# Patient Record
Sex: MALE | Race: WHITE | NOT HISPANIC OR LATINO | Employment: UNEMPLOYED | URBAN - NONMETROPOLITAN AREA
[De-identification: names, ages, dates, MRNs, and addresses within clinical notes are randomized per-mention and may not be internally consistent; named-entity substitution may affect disease eponyms.]

---

## 2022-01-21 ENCOUNTER — HOSPITAL ENCOUNTER (EMERGENCY)
Facility: HOSPITAL | Age: 52
Discharge: HOME | End: 2022-01-21
Attending: EMERGENCY MEDICINE | Admitting: EMERGENCY MEDICINE

## 2022-01-21 ENCOUNTER — APPOINTMENT (EMERGENCY)
Dept: RADIOLOGY | Facility: HOSPITAL | Age: 52
End: 2022-01-21

## 2022-01-21 VITALS
SYSTOLIC BLOOD PRESSURE: 139 MMHG | DIASTOLIC BLOOD PRESSURE: 93 MMHG | OXYGEN SATURATION: 98 % | HEART RATE: 68 BPM | RESPIRATION RATE: 17 BRPM | WEIGHT: 195 LBS | TEMPERATURE: 98.1 F

## 2022-01-21 DIAGNOSIS — U07.1 COVID-19: ICD-10-CM

## 2022-01-21 DIAGNOSIS — K74.60 CIRRHOSIS (HCC): ICD-10-CM

## 2022-01-21 DIAGNOSIS — R17 JAUNDICE: ICD-10-CM

## 2022-01-21 DIAGNOSIS — R07.89 ATYPICAL CHEST PAIN: Primary | ICD-10-CM

## 2022-01-21 LAB
ALBUMIN SERPL BCP-MCNC: 2.7 G/DL (ref 3.5–5)
ALP SERPL-CCNC: 112 U/L (ref 46–116)
ALT SERPL W P-5'-P-CCNC: 45 U/L (ref 12–78)
ANION GAP SERPL CALCULATED.3IONS-SCNC: 9 MMOL/L (ref 4–13)
APTT PPP: 39 SECONDS (ref 23–37)
AST SERPL W P-5'-P-CCNC: 118 U/L (ref 5–45)
BASOPHILS # BLD AUTO: 0.04 THOUSANDS/ΜL (ref 0–0.1)
BASOPHILS NFR BLD AUTO: 1 % (ref 0–1)
BILIRUB SERPL-MCNC: 7.98 MG/DL (ref 0.2–1)
BUN SERPL-MCNC: 8 MG/DL (ref 5–25)
CALCIUM ALBUM COR SERPL-MCNC: 9.6 MG/DL (ref 8.3–10.1)
CALCIUM SERPL-MCNC: 8.6 MG/DL (ref 8.3–10.1)
CARDIAC TROPONIN I PNL SERPL HS: 12 NG/L
CHLORIDE SERPL-SCNC: 100 MMOL/L (ref 100–108)
CK MB SERPL-MCNC: 1.7 NG/ML (ref 0–5)
CK MB SERPL-MCNC: <1 % (ref 0–2.5)
CK SERPL-CCNC: 525 U/L (ref 39–308)
CO2 SERPL-SCNC: 25 MMOL/L (ref 21–32)
CREAT SERPL-MCNC: 0.82 MG/DL (ref 0.6–1.3)
EOSINOPHIL # BLD AUTO: 0.2 THOUSAND/ΜL (ref 0–0.61)
EOSINOPHIL NFR BLD AUTO: 4 % (ref 0–6)
ERYTHROCYTE [DISTWIDTH] IN BLOOD BY AUTOMATED COUNT: 13.9 % (ref 11.6–15.1)
FLUAV RNA RESP QL NAA+PROBE: NEGATIVE
FLUBV RNA RESP QL NAA+PROBE: NEGATIVE
GFR SERPL CREATININE-BSD FRML MDRD: 103 ML/MIN/1.73SQ M
GLUCOSE SERPL-MCNC: 105 MG/DL (ref 65–140)
HCT VFR BLD AUTO: 42.3 % (ref 36.5–49.3)
HGB BLD-MCNC: 14.6 G/DL (ref 12–17)
IMM GRANULOCYTES # BLD AUTO: 0.01 THOUSAND/UL (ref 0–0.2)
IMM GRANULOCYTES NFR BLD AUTO: 0 % (ref 0–2)
INR PPP: 1.72 (ref 0.84–1.19)
LYMPHOCYTES # BLD AUTO: 1.03 THOUSANDS/ΜL (ref 0.6–4.47)
LYMPHOCYTES NFR BLD AUTO: 21 % (ref 14–44)
MAGNESIUM SERPL-MCNC: 1.9 MG/DL (ref 1.6–2.6)
MCH RBC QN AUTO: 33.7 PG (ref 26.8–34.3)
MCHC RBC AUTO-ENTMCNC: 34.5 G/DL (ref 31.4–37.4)
MCV RBC AUTO: 98 FL (ref 82–98)
MONOCYTES # BLD AUTO: 1.05 THOUSAND/ΜL (ref 0.17–1.22)
MONOCYTES NFR BLD AUTO: 21 % (ref 4–12)
NEUTROPHILS # BLD AUTO: 2.57 THOUSANDS/ΜL (ref 1.85–7.62)
NEUTS SEG NFR BLD AUTO: 53 % (ref 43–75)
NRBC BLD AUTO-RTO: 0 /100 WBCS
NT-PROBNP SERPL-MCNC: 76 PG/ML
PLATELET # BLD AUTO: 68 THOUSANDS/UL (ref 149–390)
PMV BLD AUTO: 10.7 FL (ref 8.9–12.7)
POTASSIUM SERPL-SCNC: 3.9 MMOL/L (ref 3.5–5.3)
PROT SERPL-MCNC: 7.7 G/DL (ref 6.4–8.2)
PROTHROMBIN TIME: 19.8 SECONDS (ref 11.6–14.5)
RBC # BLD AUTO: 4.33 MILLION/UL (ref 3.88–5.62)
RSV RNA RESP QL NAA+PROBE: NEGATIVE
SARS-COV-2 RNA RESP QL NAA+PROBE: POSITIVE
SODIUM SERPL-SCNC: 134 MMOL/L (ref 136–145)
WBC # BLD AUTO: 4.9 THOUSAND/UL (ref 4.31–10.16)

## 2022-01-21 PROCEDURE — 93005 ELECTROCARDIOGRAM TRACING: CPT

## 2022-01-21 PROCEDURE — 85610 PROTHROMBIN TIME: CPT | Performed by: EMERGENCY MEDICINE

## 2022-01-21 PROCEDURE — 83880 ASSAY OF NATRIURETIC PEPTIDE: CPT | Performed by: EMERGENCY MEDICINE

## 2022-01-21 PROCEDURE — 0241U HB NFCT DS VIR RESP RNA 4 TRGT: CPT | Performed by: EMERGENCY MEDICINE

## 2022-01-21 PROCEDURE — 80053 COMPREHEN METABOLIC PANEL: CPT | Performed by: EMERGENCY MEDICINE

## 2022-01-21 PROCEDURE — 82550 ASSAY OF CK (CPK): CPT | Performed by: EMERGENCY MEDICINE

## 2022-01-21 PROCEDURE — 99285 EMERGENCY DEPT VISIT HI MDM: CPT

## 2022-01-21 PROCEDURE — 36415 COLL VENOUS BLD VENIPUNCTURE: CPT | Performed by: EMERGENCY MEDICINE

## 2022-01-21 PROCEDURE — 85025 COMPLETE CBC W/AUTO DIFF WBC: CPT | Performed by: EMERGENCY MEDICINE

## 2022-01-21 PROCEDURE — 83735 ASSAY OF MAGNESIUM: CPT | Performed by: EMERGENCY MEDICINE

## 2022-01-21 PROCEDURE — 71045 X-RAY EXAM CHEST 1 VIEW: CPT

## 2022-01-21 PROCEDURE — 84484 ASSAY OF TROPONIN QUANT: CPT | Performed by: EMERGENCY MEDICINE

## 2022-01-21 PROCEDURE — 82553 CREATINE MB FRACTION: CPT | Performed by: EMERGENCY MEDICINE

## 2022-01-21 PROCEDURE — 99285 EMERGENCY DEPT VISIT HI MDM: CPT | Performed by: EMERGENCY MEDICINE

## 2022-01-21 PROCEDURE — 85730 THROMBOPLASTIN TIME PARTIAL: CPT | Performed by: EMERGENCY MEDICINE

## 2022-01-21 RX ORDER — LACTULOSE 20 G/30ML
10 SOLUTION ORAL 2 TIMES DAILY
Qty: 900 ML | Refills: 0 | Status: SHIPPED | OUTPATIENT
Start: 2022-01-21 | End: 2022-02-20

## 2022-01-21 RX ORDER — SPIRONOLACTONE 50 MG/1
50 TABLET, FILM COATED ORAL 2 TIMES DAILY
Qty: 60 TABLET | Refills: 0 | Status: SHIPPED | OUTPATIENT
Start: 2022-01-21

## 2022-01-21 RX ORDER — NADOLOL 20 MG/1
20 TABLET ORAL DAILY
Qty: 30 TABLET | Refills: 0 | Status: SHIPPED | OUTPATIENT
Start: 2022-01-21

## 2022-01-21 RX ORDER — OMEPRAZOLE 20 MG/1
40 CAPSULE, DELAYED RELEASE ORAL DAILY
Qty: 60 CAPSULE | Refills: 0 | Status: SHIPPED | OUTPATIENT
Start: 2022-01-21 | End: 2022-02-20

## 2022-01-21 RX ORDER — FLUOXETINE 20 MG/1
20 TABLET, FILM COATED ORAL DAILY
Qty: 30 TABLET | Refills: 2 | Status: SHIPPED | OUTPATIENT
Start: 2022-01-21

## 2022-01-21 RX ORDER — FOLIC ACID 1 MG/1
1 TABLET ORAL DAILY
Qty: 30 TABLET | Refills: 0 | Status: SHIPPED | OUTPATIENT
Start: 2022-01-21

## 2022-01-21 RX ORDER — FOLIC ACID/VIT BCOMP,C/CU/ZINC 5-1.5-25MG
5 TABLET ORAL DAILY
Qty: 30 TABLET | Refills: 0 | Status: SHIPPED | OUTPATIENT
Start: 2022-01-21 | End: 2022-02-20

## 2022-01-21 NOTE — ED PROVIDER NOTES
History  Chief Complaint   Patient presents with    Chest Pain     presents due to CP x5 days L anterior 7/1, denies SOB, has a cough, COVID unvaccinated, pt also is jaundiced and states has hx of liver failure but has not been on medications for some time due to loosing insurance  denies N  V D    Jaundice     Patient is a 53-year-old male presenting to the emergency department complaining of dull achy left-sided chest pain that is been present x5 days, states this started while he was walking his dog and has persisted since, no waxing/waning, no aggravating or alleviating symptoms, he reports slight nasal congestion and runny nose, denies any cough, cold, no fever or chills, no nausea vomiting diarrhea, he does have a history of cirrhosis with jaundice secondary to previous alcohol use, states his last drink was approximately 2 days ago, he has not been seen by his gastroenterologist or hepatologist secondary to losing insurance recently, ran out of his medications approximately 1 month ago, patient has not been vaccinated for COVID-19 but denies any in her known history of COVID-19 or any recent COVID positive contacts, patient states he called his PCP this morning in an effort to get an appointment but none were available prompting them to send him to the emergency department for evaluation          None       Past Medical History:   Diagnosis Date    Hypertension     Liver failure ([facility] [address] )        Past Surgical History:   Procedure Laterality Date    BRAIN SURGERY         History reviewed  No pertinent family history  I have reviewed and agree with the history as documented  E-Cigarette/Vaping     E-Cigarette/Vaping Substances     Social History     Tobacco Use    Smoking status: Never Smoker    Smokeless tobacco: Never Used   Substance Use Topics    Alcohol use: Never     Comment: former    Drug use: Not on file       Review of Systems   Constitutional: Negative      HENT: Positive for congestion and rhinorrhea  Eyes: Negative  Respiratory: Positive for shortness of breath  Cardiovascular: Positive for chest pain  Gastrointestinal: Negative  Endocrine: Negative  Genitourinary: Negative  Musculoskeletal: Negative  Skin: Negative          jaundice   Allergic/Immunologic: Negative  Neurological: Negative  Hematological: Negative  Psychiatric/Behavioral: Negative  Physical Exam  Physical Exam  Constitutional:       Appearance: He is well-developed  HENT:      Head: Normocephalic and atraumatic  Eyes:      General: Scleral icterus present  Conjunctiva/sclera:      Right eye: Right conjunctiva is injected  Left eye: Left conjunctiva is injected  Pupils: Pupils are equal, round, and reactive to light  Cardiovascular:      Rate and Rhythm: Normal rate and regular rhythm  Heart sounds: Normal heart sounds  Pulmonary:      Effort: Pulmonary effort is normal       Breath sounds: Normal breath sounds  Abdominal:      Palpations: Abdomen is soft  Musculoskeletal:         General: Normal range of motion  Cervical back: Normal range of motion and neck supple  Skin:     General: Skin is warm and dry  Coloration: Skin is jaundiced  Neurological:      Mental Status: He is alert and oriented to person, place, and time           Vital Signs  ED Triage Vitals [01/21/22 1308]   Temperature Pulse Respirations Blood Pressure SpO2   98 1 °F (36 7 °C) 67 20 139/93 98 %      Temp Source Heart Rate Source Patient Position - Orthostatic VS BP Location FiO2 (%)   Oral Monitor Sitting Right arm --      Pain Score       7           Vitals:    01/21/22 1430 01/21/22 1445 01/21/22 1500 01/21/22 1515   BP:       Pulse: 65 65 66 68   Patient Position - Orthostatic VS:                 ED Medications  Medications - No data to display    Diagnostic Studies  Results Reviewed     Procedure Component Value Units Date/Time    CKMB [[id number]]  (Normal) Collected: 01/21/22 1403    Lab Status: Final result Specimen: Blood from Arm, Left Updated: 01/21/22 1543     CK-MB Index <1 0 %      CK-MB 1 7 ng/mL     Magnesium [[id number]]  (Normal) Collected: 01/21/22 1403    Lab Status: Final result Specimen: Blood from Arm, Left Updated: 01/21/22 1512     Magnesium 1 9 mg/dL     NT-BNP PRO [[id number]]  (Normal) Collected: 01/21/22 1403    Lab Status: Final result Specimen: Blood from Arm, Left Updated: 01/21/22 1512     NT-proBNP 76 pg/mL     COVID/FLU/RSV - 2 hour TAT [[id number]]  (Abnormal) Collected: 01/21/22 1403    Lab Status: Final result Specimen: Nares from Nose Updated: 01/21/22 1447     SARS-CoV-2 Positive     INFLUENZA A PCR Negative     INFLUENZA B PCR Negative     RSV PCR Negative    Narrative:      FOR PEDIATRIC PATIENTS - copy/paste COVID Guidelines URL to browser: [email or web address]  [email or web address]    SARS-CoV-2 assay is a Nucleic Acid Amplification assay intended for the  qualitative detection of nucleic acid from SARS-CoV-2 in nasopharyngeal  swabs  Results are for the presumptive identification of SARS-CoV-2 RNA  Positive results are indicative of infection with SARS-CoV-2, the virus  causing COVID-19, but do not rule out bacterial infection or co-infection  with other viruses  Laboratories within the [place] and its  territories are required to report all positive results to the appropriate  public health authorities  Negative results do not preclude SARS-CoV-2  infection and should not be used as the sole basis for treatment or other  patient management decisions  Negative results must be combined with  clinical observations, patient history, and epidemiological information  This test has not been FDA cleared or approved  This test has been authorized by FDA under an Emergency Use Authorization  (EUA)   This test is only authorized for the duration of time the  declaration that circumstances exist justifying the authorization of the  emergency use of an in vitro diagnostic tests for detection of SARS-CoV-2  virus and/or diagnosis of COVID-19 infection under section 564(b)(1) of  the Act, 21 U  S C  881BTS-9(F)(2), unless the authorization is terminated  or revoked sooner  The test has been validated but independent review by FDA  and CLIA is pending  Test performed using [email or web address] GeneXpert: This RT-PCR assay targets N2,  a region unique to SARS-CoV-2  A conserved region in the E-gene was chosen  for pan-Sarbecovirus detection which includes SARS-CoV-2      HS Troponin 0hr (reflex protocol) [[id number]] Collected: 01/21/22 1403    Lab Status: Final result Specimen: Blood from Arm, Left Updated: 01/21/22 1435     hs TnI 0hr 12 ng/L     CBC and differential [[id number]]  (Abnormal) Collected: 01/21/22 1403    Lab Status: Final result Specimen: Blood from Arm, Left Updated: 01/21/22 1435     WBC 4 90 Thousand/uL      RBC 4 33 Million/uL      Hemoglobin 14 6 g/dL      Hematocrit 42 3 %      MCV 98 fL      MCH 33 7 pg      MCHC 34 5 g/dL      RDW 13 9 %      MPV 10 7 fL      Platelets 68 Thousands/uL      nRBC 0 /100 WBCs      Neutrophils Relative 53 %      Immat GRANS % 0 %      Lymphocytes Relative 21 %      Monocytes Relative 21 %      Eosinophils Relative 4 %      Basophils Relative 1 %      Neutrophils Absolute 2 57 Thousands/µL      Immature Grans Absolute 0 01 Thousand/uL      Lymphocytes Absolute 1 03 Thousands/µL      Monocytes Absolute 1 05 Thousand/µL      Eosinophils Absolute 0 20 Thousand/µL      Basophils Absolute 0 04 Thousands/µL     CK Total with Reflex CKMB [[id number]]  (Abnormal) Collected: 01/21/22 1403    Lab Status: Final result Specimen: Blood from Arm, Left Updated: 01/21/22 1433     Total  U/L     Comprehensive metabolic panel [[id number]]  (Abnormal) Collected: 01/21/22 1403    Lab Status: Final result Specimen: Blood from Arm, Left Updated: 01/21/22 1427     Sodium 134 mmol/L      Potassium 3 9 mmol/L      Chloride 100 mmol/L      CO2 25 mmol/L      ANION GAP 9 mmol/L      BUN 8 mg/dL      Creatinine 0 82 mg/dL      Glucose 105 mg/dL      Calcium 8 6 mg/dL      Corrected Calcium 9 6 mg/dL       U/L      ALT 45 U/L      Alkaline Phosphatase 112 U/L      Total Protein 7 7 g/dL      Albumin 2 7 g/dL      Total Bilirubin 7 98 mg/dL      eGFR 103 ml/min/1 73sq m     Narrative:      [place] guidelines for Chronic Kidney Disease (CKD):     Stage 1 with normal or high GFR (GFR > 90 mL/min/1 73 square meters)    Stage 2 Mild CKD (GFR = 60-89 mL/min/1 73 square meters)    Stage 3A Moderate CKD (GFR = 45-59 mL/min/1 73 square meters)    Stage 3B Moderate CKD (GFR = 30-44 mL/min/1 73 square meters)    Stage 4 Severe CKD (GFR = 15-29 mL/min/1 73 square meters)    Stage 5 End Stage CKD (GFR <15 mL/min/1 73 square meters)  Note: GFR calculation is accurate only with a steady state creatinine    Protime-INR [[id number]]  (Abnormal) Collected: 01/21/22 1403    Lab Status: Final result Specimen: Blood from Arm, Left Updated: 01/21/22 1427     Protime 19 8 seconds      INR 1 72    APTT [[id number]]  (Abnormal) Collected: 01/21/22 1403    Lab Status: Final result Specimen: Blood from Arm, Left Updated: 01/21/22 1427     PTT 39 seconds                  XR chest 1 view portable   Final Result by Kyle Bauer MD (01/21 1438)      No acute cardiopulmonary disease                    Workstation performed: [id number]                    Procedures  ECG 12 Lead Documentation Only    Date/Time: 1/21/2022 1:55 PM  Performed by: Franklin Loaiza DO  Authorized by: Franklin Loaiza DO     Indications / Diagnosis:  Chest pain  ECG reviewed by me, the ED Provider: yes    Patient location:  ED  Previous ECG:     Previous ECG:  Unavailable    Comparison to cardiac monitor: Yes    Interpretation:     Interpretation: normal    Rate:     ECG rate:  67    ECG rate assessment: normal    Rhythm: Rhythm: sinus rhythm    Ectopy:     Ectopy: none    QRS:     QRS axis:  Normal    QRS intervals:  Normal  Conduction:     Conduction: normal    ST segments:     ST segments:  Normal  T waves:     T waves: normal               ED Course  ED Course as of 01/21/22 1609   Fri Jan 21, 2022   1451 HS Troponin 0hr (reflex protocol)   1520 Lab and imaging findings discussed with patient at bedside, COVID-19 testing positive, likely the cause of patient's current symptoms, he has been having some chest pain for 5 days and troponin is only 12, EKG unremarkable, chest x-ray unremarkable, symptoms not consistent with ACS, he is noted to have thrombocytopenia with elevated total bilirubin, relatively similar to previous labs and consistent with patient's known history of liver disease                                             MDM    Disposition  Final diagnoses:   Atypical chest pain   COVID-19   Jaundice   Cirrhosis ([facility] [address] )     Time reflects when diagnosis was documented in both MDM as applicable and the Disposition within this note     Time User Action Codes Description Comment    1/21/2022  3:19 PM [place] Favia Add [R07 89] Atypical chest pain     1/21/2022  3:19 PM [place] Favia Add [U07 1] COVID-19     1/21/2022  3:19 PM Polanczyk, Rowena Favre Add [R17] Jaundice     1/21/2022  3:19 PM Polanczyk, Rowena Favre Add [K74 60] Cirrhosis [facility])       ED Disposition     ED Disposition Condition Date/Time Comment    Discharge Stable Fri Jan 21, 2022  3:19 PM Gurpreet Fraser discharge to home/self care  Follow-up Information     Follow up With Specialties Details Why Contact Info    Terri James MD Internal Medicine  As needed [address] [place] [address]            There are no discharge medications for this patient  No discharge procedures on file      PDMP Review     None          ED Provider  Electronically Signed by           Philipp Bullock DO  01/21/22 0798

## 2022-01-21 NOTE — Clinical Note
Fred Schilder was seen and treated in our emergency department on 1/21/2022  Diagnosis:     Kenya Disla  may return to work on return date  He may return on this date: 01/27/2022    If asymptomatic     If you have any questions or concerns, please don't hesitate to call        Jillian Ryan DO    ______________________________           _______________          _______________  Hospital Representative                              Date                                Time

## 2022-01-24 LAB
ATRIAL RATE: 67 BPM
P AXIS: 85 DEGREES
PR INTERVAL: 112 MS
QRS AXIS: 54 DEGREES
QRSD INTERVAL: 86 MS
QT INTERVAL: 442 MS
QTC INTERVAL: 467 MS
T WAVE AXIS: 52 DEGREES
VENTRICULAR RATE: 67 BPM

## 2022-04-06 ENCOUNTER — HOSPITAL ENCOUNTER (EMERGENCY)
Facility: HOSPITAL | Age: 52
Discharge: HOME | End: 2022-04-06
Attending: EMERGENCY MEDICINE | Admitting: EMERGENCY MEDICINE

## 2022-04-06 VITALS
TEMPERATURE: 98.8 F | RESPIRATION RATE: 18 BRPM | DIASTOLIC BLOOD PRESSURE: 78 MMHG | WEIGHT: 194 LBS | OXYGEN SATURATION: 95 % | HEART RATE: 83 BPM | SYSTOLIC BLOOD PRESSURE: 126 MMHG

## 2022-04-06 DIAGNOSIS — Z76.89 RETURN TO WORK EVALUATION: Primary | ICD-10-CM

## 2022-04-06 PROCEDURE — 99281 EMR DPT VST MAYX REQ PHY/QHP: CPT

## 2022-04-06 PROCEDURE — 99282 EMERGENCY DEPT VISIT SF MDM: CPT | Performed by: EMERGENCY MEDICINE

## 2022-04-06 NOTE — Clinical Note
Atif Hayes was seen and treated in our emergency department on 4/6/2022  Diagnosis:     Brandon Lynne  may return to work on return date  He may return on this date: 04/06/2022         If you have any questions or concerns, please don't hesitate to call        Prosper Partida, DO    ______________________________           _______________          _______________  Hospital Representative                              Date                                Time

## 2022-04-06 NOTE — Clinical Note
Alisha Pedraza was seen and treated in our emergency department on 4/6/2022  Diagnosis:     Debbie Marinelli  may return to work on return date  He may return on this date: If you have any questions or concerns, please don't hesitate to call        Korina Myers, DO    ______________________________           _______________          _______________  Hospital Representative                              Date                                Time

## 2022-04-06 NOTE — Clinical Note
Álvaro Varela was seen and treated in our emergency department on 4/6/2022  Diagnosis:     Luanne Harmon  may return to work on return date  He may return on this date: If you have any questions or concerns, please don't hesitate to call        Melissa Ramirez DO    ______________________________           _______________          _______________  Hospital Representative                              Date                                Time

## 2022-04-06 NOTE — ED PROVIDER NOTES
History  Chief Complaint   Patient presents with    Labs Only     pt tested covid positive 1 month ago  pt states he needs to be retested to go back to work  Patient is a 44-year-old male presents emergency department asymptomatic currently requesting repeat testing for COVID due to a positive test 1 month ago he was advised by his employer that he needed to be retested to return to work  History provided by:  Patient      Prior to Admission Medications   Prescriptions Last Dose Informant Patient Reported? Taking? B-Complex-C-Biotin-Minerals-FA (Folbee Plus CZ) 5 MG TABS   No No   Sig: Take 1 tablet (5 mg total) by mouth in the morning   FLUoxetine (PROzac) 20 MG tablet   No No   Sig: Take 1 tablet (20 mg total) by mouth daily   Multiple Vitamins-Minerals (Centrum Men) TABS   No No   Sig: Take 1 tablet by mouth in the morning   folic acid (KP Folic Acid) 1 mg tablet   No No   Sig: Take 1 tablet (1 mg total) by mouth daily   lactulose 20 g/30 mL   No No   Sig: Take 15 mL (10 g total) by mouth 2 (two) times a day   nadolol (CORGARD) 20 mg tablet   No No   Sig: Take 1 tablet (20 mg total) by mouth daily   omeprazole (PriLOSEC) 20 mg delayed release capsule   No No   Sig: Take 2 capsules (40 mg total) by mouth daily   spironolactone (ALDACTONE) 50 mg tablet   No No   Sig: Take 1 tablet (50 mg total) by mouth 2 (two) times a day      Facility-Administered Medications: None       Past Medical History:   Diagnosis Date    Hypertension     Liver failure ([facility] [address] )        Past Surgical History:   Procedure Laterality Date    BRAIN SURGERY         History reviewed  No pertinent family history  I have reviewed and agree with the history as documented      E-Cigarette/Vaping     E-Cigarette/Vaping Substances     Social History     Tobacco Use    Smoking status: Never Smoker    Smokeless tobacco: Never Used   Substance Use Topics    Alcohol use: Never     Comment: former    Drug use: Not on file       Review of Systems   Constitutional: Negative for activity change, appetite change, chills, fatigue and fever  HENT: Negative for congestion, ear pain, rhinorrhea and sore throat  Eyes: Negative for discharge, redness and visual disturbance  Respiratory: Negative for cough, chest tightness, shortness of breath and wheezing  Cardiovascular: Negative for chest pain and palpitations  Gastrointestinal: Negative for abdominal pain, constipation, diarrhea, nausea and vomiting  Endocrine: Negative for polydipsia and polyuria  Genitourinary: Negative for difficulty urinating, dysuria, frequency, hematuria and urgency  Musculoskeletal: Negative for arthralgias and myalgias  Skin: Negative for color change, pallor and rash  Neurological: Negative for dizziness, weakness, light-headedness, numbness and headaches  Hematological: Negative for adenopathy  Does not bruise/bleed easily  All other systems reviewed and are negative  Physical Exam  Physical Exam  Vitals and nursing note reviewed  Constitutional:       Appearance: He is well-developed  HENT:      Head: Normocephalic and atraumatic  Right Ear: External ear normal       Left Ear: External ear normal       Nose: Nose normal    Eyes:      Conjunctiva/sclera: Conjunctivae normal       Pupils: Pupils are equal, round, and reactive to light  Cardiovascular:      Rate and Rhythm: Normal rate and regular rhythm  Heart sounds: Normal heart sounds  Pulmonary:      Effort: Pulmonary effort is normal  No respiratory distress  Breath sounds: Normal breath sounds  No wheezing or rales  Chest:      Chest wall: No tenderness  Abdominal:      General: Bowel sounds are normal  There is no distension  Palpations: Abdomen is soft  Tenderness: There is no abdominal tenderness  There is no guarding  Musculoskeletal:         General: Normal range of motion  Cervical back: Normal range of motion and neck supple     Skin: General: Skin is warm and dry  Neurological:      Mental Status: He is alert and oriented to person, place, and time  Cranial Nerves: No cranial nerve deficit  Sensory: No sensory deficit  Vital Signs  ED Triage Vitals [04/06/22 1115]   Temperature Pulse Respirations Blood Pressure SpO2   98 8 °F (37 1 °C) 83 18 126/78 95 %      Temp Source Heart Rate Source Patient Position - Orthostatic VS BP Location FiO2 (%)   Temporal Monitor Sitting Left arm --      Pain Score       --           Vitals:    04/06/22 1115   BP: 126/78   Pulse: 83   Patient Position - Orthostatic VS: Sitting         Visual Acuity      ED Medications  Medications - No data to display    Diagnostic Studies  Results Reviewed     None                 No orders to display              Procedures  Procedures         ED Course                                             MDM  Number of Diagnoses or Management Options  Return to work evaluation: new and does not require workup  Diagnosis management comments: Patient is afebrile nontoxic well-appearing clinically and hemodynamically stable in the emergency department he is asymptomatic requesting repeat testing for return to work for [employer]  Patient has been quarantine and well be on the recommended [facility] quarantine period At this point  Patient is asymptomatic advised that repeat testing is not indicated recommended as it may still be positive for up to 3 months following a positive test advised that he may return to work and provided with note permitting return to work  Advised follow-up with primary physician return precautions and anticipatory guidance discussed           Amount and/or Complexity of Data Reviewed  Decide to obtain previous medical records or to obtain history from someone other than the patient: yes  Review and summarize past medical records: yes    Risk of Complications, Morbidity, and/or Mortality  Presenting problems: low  Management options: low    Patient Progress  Patient progress: stable      Disposition  Final diagnoses:   Return to work evaluation     Time reflects when diagnosis was documented in both MDM as applicable and the Disposition within this note     Time User Action Codes Description Comment    4/6/2022 11:19 AM Meaghan Carrillo [Z76 89] Return to work evaluation       ED Disposition     ED Disposition Condition Date/Time Comment    Discharge Stable Wed Apr 6, 2022 11:18 AM Atif Hayes discharge to home/self care  Follow-up Information     Follow up With Specialties Details Why Contact Info    Neptali Carmichael MD Internal Medicine   [address] [facility] [place] [address]            Patient's Medications   Discharge Prescriptions    No medications on file       No discharge procedures on file      PDMP Review     None          ED Provider  Electronically Signed by           Prosper Partida DO  04/06/22 1128

## 2024-03-20 ENCOUNTER — APPOINTMENT (INPATIENT)
Dept: RADIOLOGY | Facility: HOSPITAL | Age: 54
End: 2024-03-20
Payer: COMMERCIAL

## 2024-03-20 ENCOUNTER — APPOINTMENT (EMERGENCY)
Dept: CT IMAGING | Facility: HOSPITAL | Age: 54
End: 2024-03-20
Payer: COMMERCIAL

## 2024-03-20 ENCOUNTER — HOSPITAL ENCOUNTER (INPATIENT)
Facility: HOSPITAL | Age: 54
LOS: 9 days | Discharge: NURSING, REHAB OR LONG-TERM CARE | End: 2024-03-29
Attending: EMERGENCY MEDICINE | Admitting: ANESTHESIOLOGY
Payer: COMMERCIAL

## 2024-03-20 ENCOUNTER — APPOINTMENT (EMERGENCY)
Dept: ULTRASOUND IMAGING | Facility: HOSPITAL | Age: 54
End: 2024-03-20
Payer: COMMERCIAL

## 2024-03-20 ENCOUNTER — APPOINTMENT (EMERGENCY)
Dept: RADIOLOGY | Facility: HOSPITAL | Age: 54
End: 2024-03-20
Payer: COMMERCIAL

## 2024-03-20 ENCOUNTER — APPOINTMENT (EMERGENCY)
Dept: INTERVENTIONAL RADIOLOGY/VASCULAR | Facility: HOSPITAL | Age: 54
End: 2024-03-20
Attending: EMERGENCY MEDICINE
Payer: COMMERCIAL

## 2024-03-20 DIAGNOSIS — K74.60 CIRRHOSIS (HCC): ICD-10-CM

## 2024-03-20 DIAGNOSIS — G47.33 OSA (OBSTRUCTIVE SLEEP APNEA): ICD-10-CM

## 2024-03-20 DIAGNOSIS — J90 PLEURAL EFFUSION: Primary | ICD-10-CM

## 2024-03-20 DIAGNOSIS — E80.6 HYPERBILIRUBINEMIA: ICD-10-CM

## 2024-03-20 DIAGNOSIS — F31.9 BIPOLAR DEPRESSION (HCC): ICD-10-CM

## 2024-03-20 DIAGNOSIS — K80.20 GALLSTONES: ICD-10-CM

## 2024-03-20 DIAGNOSIS — R17 SERUM TOTAL BILIRUBIN ELEVATED: ICD-10-CM

## 2024-03-20 DIAGNOSIS — J90 PLEURAL EFFUSION, RIGHT: ICD-10-CM

## 2024-03-20 DIAGNOSIS — F10.10 ALCOHOL ABUSE: ICD-10-CM

## 2024-03-20 DIAGNOSIS — K70.30 ALCOHOLIC CIRRHOSIS OF LIVER WITHOUT ASCITES (HCC): ICD-10-CM

## 2024-03-20 DIAGNOSIS — K70.30 ALCOHOLIC CIRRHOSIS, UNSPECIFIED WHETHER ASCITES PRESENT (HCC): ICD-10-CM

## 2024-03-20 PROBLEM — D69.6 THROMBOCYTOPENIA (HCC): Status: ACTIVE | Noted: 2024-03-20

## 2024-03-20 PROBLEM — E87.1 HYPONATREMIA: Status: ACTIVE | Noted: 2024-03-20

## 2024-03-20 PROBLEM — R65.10 SIRS (SYSTEMIC INFLAMMATORY RESPONSE SYNDROME) (HCC): Status: ACTIVE | Noted: 2024-03-20

## 2024-03-20 PROBLEM — E87.20 LACTIC ACID ACIDOSIS: Status: ACTIVE | Noted: 2024-03-20

## 2024-03-20 PROBLEM — J96.01 ACUTE RESPIRATORY FAILURE WITH HYPOXIA (HCC): Status: ACTIVE | Noted: 2024-03-20

## 2024-03-20 PROBLEM — R26.2 AMBULATORY DYSFUNCTION: Status: ACTIVE | Noted: 2023-07-03

## 2024-03-20 PROBLEM — R79.1 ELEVATED INR: Status: ACTIVE | Noted: 2024-03-20

## 2024-03-20 LAB
2HR DELTA HS TROPONIN: 0 NG/L
ALBUMIN SERPL BCP-MCNC: 2 G/DL (ref 3.5–5)
ALBUMIN SERPL BCP-MCNC: 2.2 G/DL (ref 3.5–5)
ALP SERPL-CCNC: 102 U/L (ref 34–104)
ALP SERPL-CCNC: 115 U/L (ref 34–104)
ALT SERPL W P-5'-P-CCNC: 24 U/L (ref 7–52)
ALT SERPL W P-5'-P-CCNC: 26 U/L (ref 7–52)
AMMONIA PLAS-SCNC: 44 UMOL/L (ref 18–72)
ANION GAP SERPL CALCULATED.3IONS-SCNC: 6 MMOL/L (ref 4–13)
ANION GAP SERPL CALCULATED.3IONS-SCNC: 8 MMOL/L (ref 4–13)
APPEARANCE FLD: ABNORMAL
APTT PPP: 50 SECONDS (ref 23–37)
AST SERPL W P-5'-P-CCNC: 86 U/L (ref 13–39)
AST SERPL W P-5'-P-CCNC: 91 U/L (ref 13–39)
BACTERIA UR QL AUTO: ABNORMAL /HPF
BASOPHILS # BLD AUTO: 0.12 THOUSANDS/ÂΜL (ref 0–0.1)
BASOPHILS NFR BLD AUTO: 2 % (ref 0–1)
BASOPHILS NFR FLD MANUAL: 1 %
BILIRUB SERPL-MCNC: 14.64 MG/DL (ref 0.2–1)
BILIRUB SERPL-MCNC: 15.33 MG/DL (ref 0.2–1)
BILIRUB UR QL STRIP: ABNORMAL
BNP SERPL-MCNC: 130 PG/ML (ref 0–100)
BNP SERPL-MCNC: 133 PG/ML (ref 0–100)
BUN SERPL-MCNC: 3 MG/DL (ref 5–25)
BUN SERPL-MCNC: 3 MG/DL (ref 5–25)
CALCIUM ALBUM COR SERPL-MCNC: 8.8 MG/DL (ref 8.3–10.1)
CALCIUM ALBUM COR SERPL-MCNC: 9.1 MG/DL (ref 8.3–10.1)
CALCIUM SERPL-MCNC: 7.4 MG/DL (ref 8.4–10.2)
CALCIUM SERPL-MCNC: 7.5 MG/DL (ref 8.4–10.2)
CARDIAC TROPONIN I PNL SERPL HS: 17 NG/L
CARDIAC TROPONIN I PNL SERPL HS: 17 NG/L
CHLORIDE SERPL-SCNC: 96 MMOL/L (ref 96–108)
CHLORIDE SERPL-SCNC: 99 MMOL/L (ref 96–108)
CLARITY UR: CLEAR
CO2 SERPL-SCNC: 26 MMOL/L (ref 21–32)
CO2 SERPL-SCNC: 28 MMOL/L (ref 21–32)
COLOR FLD: ABNORMAL
COLOR UR: ABNORMAL
CREAT SERPL-MCNC: 0.69 MG/DL (ref 0.6–1.3)
CREAT SERPL-MCNC: 0.73 MG/DL (ref 0.6–1.3)
EOSINOPHIL # BLD AUTO: 0.24 THOUSAND/ÂΜL (ref 0–0.61)
EOSINOPHIL NFR BLD AUTO: 4 % (ref 0–6)
EOSINOPHIL NFR FLD MANUAL: 1 %
ERYTHROCYTE [DISTWIDTH] IN BLOOD BY AUTOMATED COUNT: 13 % (ref 11.6–15.1)
ETHANOL SERPL-MCNC: 120 MG/DL
GFR SERPL CREATININE-BSD FRML MDRD: 107 ML/MIN/1.73SQ M
GFR SERPL CREATININE-BSD FRML MDRD: 109 ML/MIN/1.73SQ M
GLUCOSE FLD-MCNC: 135 MG/DL
GLUCOSE SERPL-MCNC: 163 MG/DL (ref 65–140)
GLUCOSE SERPL-MCNC: 96 MG/DL (ref 65–140)
GLUCOSE UR STRIP-MCNC: ABNORMAL MG/DL
HCT VFR BLD AUTO: 34.8 % (ref 36.5–49.3)
HGB BLD-MCNC: 11.5 G/DL (ref 12–17)
HGB UR QL STRIP.AUTO: ABNORMAL
HISTIOCYTES NFR FLD: 79 %
HYALINE CASTS #/AREA URNS LPF: ABNORMAL /LPF
IMM GRANULOCYTES # BLD AUTO: 0.04 THOUSAND/UL (ref 0–0.2)
IMM GRANULOCYTES NFR BLD AUTO: 1 % (ref 0–2)
INR PPP: 2.84 (ref 0.84–1.19)
KETONES UR STRIP-MCNC: NEGATIVE MG/DL
LACTATE SERPL-SCNC: 3.2 MMOL/L (ref 0.5–2)
LACTATE SERPL-SCNC: 3.2 MMOL/L (ref 0.5–2)
LACTATE SERPL-SCNC: 3.5 MMOL/L (ref 0.5–2)
LACTATE SERPL-SCNC: 4.4 MMOL/L (ref 0.5–2)
LDH FLD L TO P-CCNC: 91 U/L
LDH SERPL-CCNC: 392 U/L (ref 140–271)
LEUKOCYTE ESTERASE UR QL STRIP: NEGATIVE
LYMPHOCYTES # BLD AUTO: 0.82 THOUSANDS/ÂΜL (ref 0.6–4.47)
LYMPHOCYTES NFR BLD AUTO: 14 % (ref 14–44)
LYMPHOCYTES NFR BLD AUTO: 9 %
MCH RBC QN AUTO: 33.7 PG (ref 26.8–34.3)
MCHC RBC AUTO-ENTMCNC: 33 G/DL (ref 31.4–37.4)
MCV RBC AUTO: 102 FL (ref 82–98)
MONO+MESO NFR FLD MANUAL: 3 %
MONOCYTES # BLD AUTO: 0.94 THOUSAND/ÂΜL (ref 0.17–1.22)
MONOCYTES NFR BLD AUTO: 17 % (ref 4–12)
MONOCYTES NFR BLD AUTO: 5 %
NEUTROPHILS # BLD AUTO: 3.54 THOUSANDS/ÂΜL (ref 1.85–7.62)
NEUTS SEG NFR BLD AUTO: 2 %
NEUTS SEG NFR BLD AUTO: 62 % (ref 43–75)
NITRITE UR QL STRIP: NEGATIVE
NON-SQ EPI CELLS URNS QL MICRO: ABNORMAL /HPF
NRBC BLD AUTO-RTO: 0 /100 WBCS
PH BODY FLUID: 7.4
PH UR STRIP.AUTO: 7 [PH]
PLATELET # BLD AUTO: 78 THOUSANDS/UL (ref 149–390)
PMV BLD AUTO: 8.9 FL (ref 8.9–12.7)
POTASSIUM SERPL-SCNC: 3.1 MMOL/L (ref 3.5–5.3)
POTASSIUM SERPL-SCNC: 3.5 MMOL/L (ref 3.5–5.3)
PROCALCITONIN SERPL-MCNC: 0.09 NG/ML
PROT FLD-MCNC: <3 G/DL
PROT SERPL-MCNC: 5.6 G/DL (ref 6.4–8.4)
PROT SERPL-MCNC: 6.2 G/DL (ref 6.4–8.4)
PROT UR STRIP-MCNC: ABNORMAL MG/DL
PROTHROMBIN TIME: 30.1 SECONDS (ref 11.6–14.5)
RBC # BLD AUTO: 3.41 MILLION/UL (ref 3.88–5.62)
RBC #/AREA URNS AUTO: ABNORMAL /HPF
SITE: ABNORMAL
SODIUM SERPL-SCNC: 130 MMOL/L (ref 135–147)
SODIUM SERPL-SCNC: 133 MMOL/L (ref 135–147)
SP GR UR STRIP.AUTO: 1.01 (ref 1–1.03)
TOTAL CELLS COUNTED SPEC: 100
TOTAL PROTEIN FLUID: <0.1 G/DL
TSH SERPL DL<=0.05 MIU/L-ACNC: 3.32 UIU/ML (ref 0.45–4.5)
UROBILINOGEN UR QL STRIP.AUTO: >=8 E.U./DL
WBC # BLD AUTO: 5.7 THOUSAND/UL (ref 4.31–10.16)
WBC # FLD MANUAL: 127 /UL
WBC #/AREA URNS AUTO: ABNORMAL /HPF

## 2024-03-20 PROCEDURE — 87205 SMEAR GRAM STAIN: CPT | Performed by: RADIOLOGY

## 2024-03-20 PROCEDURE — 80053 COMPREHEN METABOLIC PANEL: CPT

## 2024-03-20 PROCEDURE — 87040 BLOOD CULTURE FOR BACTERIA: CPT | Performed by: EMERGENCY MEDICINE

## 2024-03-20 PROCEDURE — 83615 LACTATE (LD) (LDH) ENZYME: CPT | Performed by: NURSE PRACTITIONER

## 2024-03-20 PROCEDURE — C1729 CATH, DRAINAGE: HCPCS

## 2024-03-20 PROCEDURE — 82077 ASSAY SPEC XCP UR&BREATH IA: CPT | Performed by: NURSE PRACTITIONER

## 2024-03-20 PROCEDURE — 84484 ASSAY OF TROPONIN QUANT: CPT

## 2024-03-20 PROCEDURE — 83986 ASSAY PH BODY FLUID NOS: CPT | Performed by: RADIOLOGY

## 2024-03-20 PROCEDURE — 85025 COMPLETE CBC W/AUTO DIFF WBC: CPT

## 2024-03-20 PROCEDURE — 32557 INSERT CATH PLEURA W/ IMAGE: CPT

## 2024-03-20 PROCEDURE — 84145 PROCALCITONIN (PCT): CPT | Performed by: EMERGENCY MEDICINE

## 2024-03-20 PROCEDURE — 94660 CPAP INITIATION&MGMT: CPT

## 2024-03-20 PROCEDURE — 83880 ASSAY OF NATRIURETIC PEPTIDE: CPT | Performed by: NURSE PRACTITIONER

## 2024-03-20 PROCEDURE — 83615 LACTATE (LD) (LDH) ENZYME: CPT | Performed by: RADIOLOGY

## 2024-03-20 PROCEDURE — 71045 X-RAY EXAM CHEST 1 VIEW: CPT

## 2024-03-20 PROCEDURE — 83605 ASSAY OF LACTIC ACID: CPT | Performed by: NURSE PRACTITIONER

## 2024-03-20 PROCEDURE — 84157 ASSAY OF PROTEIN OTHER: CPT | Performed by: RADIOLOGY

## 2024-03-20 PROCEDURE — 99285 EMERGENCY DEPT VISIT HI MDM: CPT | Performed by: EMERGENCY MEDICINE

## 2024-03-20 PROCEDURE — 74176 CT ABD & PELVIS W/O CONTRAST: CPT

## 2024-03-20 PROCEDURE — 96374 THER/PROPH/DIAG INJ IV PUSH: CPT

## 2024-03-20 PROCEDURE — 87070 CULTURE OTHR SPECIMN AEROBIC: CPT | Performed by: RADIOLOGY

## 2024-03-20 PROCEDURE — 89051 BODY FLUID CELL COUNT: CPT | Performed by: RADIOLOGY

## 2024-03-20 PROCEDURE — C1769 GUIDE WIRE: HCPCS

## 2024-03-20 PROCEDURE — 82140 ASSAY OF AMMONIA: CPT | Performed by: NURSE PRACTITIONER

## 2024-03-20 PROCEDURE — 36415 COLL VENOUS BLD VENIPUNCTURE: CPT | Performed by: EMERGENCY MEDICINE

## 2024-03-20 PROCEDURE — 99223 1ST HOSP IP/OBS HIGH 75: CPT | Performed by: NURSE PRACTITIONER

## 2024-03-20 PROCEDURE — 82945 GLUCOSE OTHER FLUID: CPT | Performed by: RADIOLOGY

## 2024-03-20 PROCEDURE — 71250 CT THORAX DX C-: CPT

## 2024-03-20 PROCEDURE — 94760 N-INVAS EAR/PLS OXIMETRY 1: CPT

## 2024-03-20 PROCEDURE — 88305 TISSUE EXAM BY PATHOLOGIST: CPT | Performed by: PATHOLOGY

## 2024-03-20 PROCEDURE — 88112 CYTOPATH CELL ENHANCE TECH: CPT | Performed by: PATHOLOGY

## 2024-03-20 PROCEDURE — 85730 THROMBOPLASTIN TIME PARTIAL: CPT

## 2024-03-20 PROCEDURE — 85610 PROTHROMBIN TIME: CPT

## 2024-03-20 PROCEDURE — 83605 ASSAY OF LACTIC ACID: CPT | Performed by: EMERGENCY MEDICINE

## 2024-03-20 PROCEDURE — 0W9930Z DRAINAGE OF RIGHT PLEURAL CAVITY WITH DRAINAGE DEVICE, PERCUTANEOUS APPROACH: ICD-10-PCS | Performed by: RADIOLOGY

## 2024-03-20 PROCEDURE — 99285 EMERGENCY DEPT VISIT HI MDM: CPT

## 2024-03-20 PROCEDURE — 80053 COMPREHEN METABOLIC PANEL: CPT | Performed by: NURSE PRACTITIONER

## 2024-03-20 PROCEDURE — 83880 ASSAY OF NATRIURETIC PEPTIDE: CPT

## 2024-03-20 PROCEDURE — 32557 INSERT CATH PLEURA W/ IMAGE: CPT | Performed by: RADIOLOGY

## 2024-03-20 PROCEDURE — 93005 ELECTROCARDIOGRAM TRACING: CPT

## 2024-03-20 PROCEDURE — 81001 URINALYSIS AUTO W/SCOPE: CPT | Performed by: NURSE PRACTITIONER

## 2024-03-20 PROCEDURE — 84443 ASSAY THYROID STIM HORMONE: CPT | Performed by: NURSE PRACTITIONER

## 2024-03-20 RX ORDER — FUROSEMIDE 10 MG/ML
40 INJECTION INTRAMUSCULAR; INTRAVENOUS ONCE
Status: COMPLETED | OUTPATIENT
Start: 2024-03-20 | End: 2024-03-20

## 2024-03-20 RX ORDER — CARVEDILOL 6.25 MG/1
6.25 TABLET ORAL 2 TIMES DAILY WITH MEALS
COMMUNITY

## 2024-03-20 RX ORDER — LACTULOSE 10 G/15ML
30 SOLUTION ORAL DAILY
Status: DISCONTINUED | OUTPATIENT
Start: 2024-03-21 | End: 2024-03-29 | Stop reason: HOSPADM

## 2024-03-20 RX ORDER — POTASSIUM CHLORIDE 20 MEQ/1
40 TABLET, EXTENDED RELEASE ORAL ONCE
Status: COMPLETED | OUTPATIENT
Start: 2024-03-20 | End: 2024-03-20

## 2024-03-20 RX ORDER — CHOLECALCIFEROL (VITAMIN D3) 125 MCG
5 CAPSULE ORAL
COMMUNITY
End: 2024-03-29

## 2024-03-20 RX ORDER — ALBUMIN, HUMAN INJ 5% 5 %
12.5 SOLUTION INTRAVENOUS ONCE
Status: CANCELLED | OUTPATIENT
Start: 2024-03-20 | End: 2024-03-20

## 2024-03-20 RX ORDER — BACILLUS COAGULANS 1B CELL
1 CAPSULE ORAL DAILY
COMMUNITY
End: 2024-03-29

## 2024-03-20 RX ORDER — FOLIC ACID 1 MG/1
1 TABLET ORAL DAILY
COMMUNITY

## 2024-03-20 RX ORDER — LAMOTRIGINE 25 MG/1
25 TABLET ORAL DAILY
Status: DISCONTINUED | OUTPATIENT
Start: 2024-03-21 | End: 2024-03-29 | Stop reason: HOSPADM

## 2024-03-20 RX ORDER — LANOLIN ALCOHOL/MO/W.PET/CERES
100 CREAM (GRAM) TOPICAL DAILY
Status: DISCONTINUED | OUTPATIENT
Start: 2024-03-21 | End: 2024-03-29 | Stop reason: HOSPADM

## 2024-03-20 RX ORDER — THIAMINE MONONITRATE (VIT B1) 100 MG
100 TABLET ORAL DAILY
COMMUNITY
End: 2024-03-29

## 2024-03-20 RX ORDER — CEFTRIAXONE 1 G/50ML
1000 INJECTION, SOLUTION INTRAVENOUS ONCE
Status: COMPLETED | OUTPATIENT
Start: 2024-03-20 | End: 2024-03-20

## 2024-03-20 RX ORDER — CEFTRIAXONE 1 G/50ML
1000 INJECTION, SOLUTION INTRAVENOUS EVERY 24 HOURS
Status: DISCONTINUED | OUTPATIENT
Start: 2024-03-21 | End: 2024-03-21

## 2024-03-20 RX ORDER — OMEPRAZOLE 40 MG/1
40 CAPSULE, DELAYED RELEASE ORAL DAILY
COMMUNITY

## 2024-03-20 RX ORDER — MULTIVITAMIN
1 TABLET ORAL DAILY
COMMUNITY
End: 2024-03-29

## 2024-03-20 RX ORDER — CHLORDIAZEPOXIDE HYDROCHLORIDE 25 MG/1
50 CAPSULE, GELATIN COATED ORAL EVERY 8 HOURS SCHEDULED
Status: DISCONTINUED | OUTPATIENT
Start: 2024-03-20 | End: 2024-03-22

## 2024-03-20 RX ORDER — CALCIUM CARBONATE/VITAMIN D3 500-10/5ML
400 LIQUID (ML) ORAL DAILY
COMMUNITY

## 2024-03-20 RX ORDER — FLUOXETINE 10 MG/1
20 CAPSULE ORAL DAILY
COMMUNITY

## 2024-03-20 RX ORDER — SPIRONOLACTONE 100 MG/1
50 TABLET, FILM COATED ORAL DAILY
COMMUNITY
End: 2024-03-29

## 2024-03-20 RX ORDER — LIDOCAINE WITH 8.4% SOD BICARB 0.9%(10ML)
SYRINGE (ML) INJECTION AS NEEDED
Status: COMPLETED | OUTPATIENT
Start: 2024-03-20 | End: 2024-03-20

## 2024-03-20 RX ORDER — FUROSEMIDE 20 MG/1
60 TABLET ORAL DAILY
COMMUNITY
End: 2024-03-29

## 2024-03-20 RX ORDER — LACTULOSE 20 G/30ML
30 SOLUTION ORAL DAILY
COMMUNITY

## 2024-03-20 RX ORDER — LORAZEPAM 1 MG/1
2 TABLET ORAL ONCE
Status: COMPLETED | OUTPATIENT
Start: 2024-03-20 | End: 2024-03-20

## 2024-03-20 RX ORDER — FOLIC ACID 1 MG/1
1 TABLET ORAL DAILY
Status: DISCONTINUED | OUTPATIENT
Start: 2024-03-21 | End: 2024-03-29 | Stop reason: HOSPADM

## 2024-03-20 RX ORDER — PANTOPRAZOLE SODIUM 40 MG/1
40 TABLET, DELAYED RELEASE ORAL
Status: DISCONTINUED | OUTPATIENT
Start: 2024-03-21 | End: 2024-03-29 | Stop reason: HOSPADM

## 2024-03-20 RX ORDER — ALBUMIN, HUMAN INJ 5% 5 %
12.5 SOLUTION INTRAVENOUS ONCE
Status: DISCONTINUED | OUTPATIENT
Start: 2024-03-20 | End: 2024-03-20

## 2024-03-20 RX ORDER — FLUOXETINE HYDROCHLORIDE 20 MG/1
20 CAPSULE ORAL DAILY
Status: DISCONTINUED | OUTPATIENT
Start: 2024-03-21 | End: 2024-03-29 | Stop reason: HOSPADM

## 2024-03-20 RX ORDER — LAMOTRIGINE 25 MG/1
25 TABLET ORAL DAILY
COMMUNITY

## 2024-03-20 RX ORDER — HEPARIN SODIUM 5000 [USP'U]/ML
5000 INJECTION, SOLUTION INTRAVENOUS; SUBCUTANEOUS EVERY 8 HOURS SCHEDULED
Status: DISCONTINUED | OUTPATIENT
Start: 2024-03-21 | End: 2024-03-20

## 2024-03-20 RX ADMIN — FUROSEMIDE 40 MG: 10 INJECTION, SOLUTION INTRAMUSCULAR; INTRAVENOUS at 17:43

## 2024-03-20 RX ADMIN — CHLORDIAZEPOXIDE HYDROCHLORIDE 50 MG: 25 CAPSULE ORAL at 17:43

## 2024-03-20 RX ADMIN — POTASSIUM CHLORIDE 40 MEQ: 1500 TABLET, EXTENDED RELEASE ORAL at 18:49

## 2024-03-20 RX ADMIN — LORAZEPAM 2 MG: 1 TABLET ORAL at 17:48

## 2024-03-20 RX ADMIN — CEFTRIAXONE 1000 MG: 1 INJECTION, SOLUTION INTRAVENOUS at 16:11

## 2024-03-20 RX ADMIN — RIFAXIMIN 550 MG: 550 TABLET ORAL at 17:43

## 2024-03-20 RX ADMIN — THIAMINE HYDROCHLORIDE: 100 INJECTION, SOLUTION INTRAMUSCULAR; INTRAVENOUS at 17:25

## 2024-03-20 RX ADMIN — Medication 20 ML: at 15:49

## 2024-03-20 RX ADMIN — POTASSIUM CHLORIDE 40 MEQ: 1500 TABLET, EXTENDED RELEASE ORAL at 14:48

## 2024-03-20 RX ADMIN — CHLORDIAZEPOXIDE HYDROCHLORIDE 50 MG: 25 CAPSULE ORAL at 22:21

## 2024-03-20 NOTE — ASSESSMENT & PLAN NOTE
Lactic acid 4.4 on presentation  The 30ml/kg fluid bolus was not given to the patient despite hypotension and/or significantly elevated lactate of ? 4 and/or presence of septic shock due to: Concern for fluid/volume overload. IR was consulted and chest tube was placed for significantly large right pleural effusion. Lasix also administered.    - Trend lactic
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
## 2024-03-20 NOTE — ASSESSMENT & PLAN NOTE
Tachycardia, tachypnea, and Lactic acidosis suspected due to right pleural effusion    - CT CAP completed - Large right effusion opacifies the right hemithorax with shift of the mediastinum to the left. No obvious mass is seen. A few air bronchograms are noted. Infection/empyema is not excluded given the degree of pleural fluid. Underlying neoplasm could also   be present. Further pulmonary evaluation is advised.  - Trend LA, WBC, and fever curve  - Follow up on cx: blood, pleural fluid, urine  - Cont empiric CTX, consider de-escalating pending cx results

## 2024-03-20 NOTE — ASSESSMENT & PLAN NOTE
Secondary to cirrhosis  INR 2.84 on presentation    - Trend INR  - Start pharmacological DVT Ppx when appropriate
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
## 2024-03-20 NOTE — BRIEF OP NOTE (RAD/CATH)
INTERVENTIONAL RADIOLOGY PROCEDURE NOTE    Date: 3/20/2024    Procedure:   Procedure Summary       Date:  Room / Location:     Anesthesia Start:  Anesthesia Stop:     Procedure:  Diagnosis:     Scheduled Providers:  Responsible Provider:     Anesthesia Type: Not recorded ASA Status: Not recorded          Preoperative diagnosis: No diagnosis found.     Postoperative diagnosis: Same.    Surgeon: Aubrey Sesay MD     Assistant: None. No qualified resident was available.    Blood loss: minimal    Specimens: right pleural effusion     Findings: Successful 8 German chest tube placed into a massive right pleural effusion with 2 liters removed today.  Plan: Cap tube, IR to drain tomorrow again with vacuum bottle.  Tube may be removed when effusion resolved.    Complications: None immediate.    Anesthesia: local

## 2024-03-20 NOTE — ASSESSMENT & PLAN NOTE
Reports last drink one day prior to admission. Etoh level 120 on presentation.  Reports drinking 4 16 ounce beers per day.  Admits to etoh withdrawal in the past, denies seizures    - CIWA protocol initiated  - Librium 25 mg PO Q 8 hours  - Thiamine/folic acid/MVI daily  - Seizure and aspiration precautions

## 2024-03-20 NOTE — ASSESSMENT & PLAN NOTE
MELD-NA 30 points on presentation  Reports non-compliance of all medications with exception of lactulose daily  Ammonia 44    - Daily MELD labs  - RUQ US ordered  - Consult GI

## 2024-03-20 NOTE — ASSESSMENT & PLAN NOTE
Elevated T bili of 15.33 on presentation  Likely secondary to cirrhosis    - RUQ US ordered  - Trend labs
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
## 2024-03-21 ENCOUNTER — APPOINTMENT (INPATIENT)
Dept: ULTRASOUND IMAGING | Facility: HOSPITAL | Age: 54
End: 2024-03-21
Payer: COMMERCIAL

## 2024-03-21 ENCOUNTER — APPOINTMENT (INPATIENT)
Dept: NON INVASIVE DIAGNOSTICS | Facility: HOSPITAL | Age: 54
End: 2024-03-21
Payer: COMMERCIAL

## 2024-03-21 ENCOUNTER — APPOINTMENT (INPATIENT)
Dept: RADIOLOGY | Facility: HOSPITAL | Age: 54
End: 2024-03-21
Payer: COMMERCIAL

## 2024-03-21 PROBLEM — R65.10 SIRS (SYSTEMIC INFLAMMATORY RESPONSE SYNDROME) (HCC): Status: RESOLVED | Noted: 2024-03-20 | Resolved: 2024-03-21

## 2024-03-21 PROBLEM — E87.1 HYPONATREMIA: Status: RESOLVED | Noted: 2024-03-20 | Resolved: 2024-03-21

## 2024-03-21 PROBLEM — E87.20 LACTIC ACID ACIDOSIS: Status: RESOLVED | Noted: 2024-03-20 | Resolved: 2024-03-21

## 2024-03-21 LAB
ALBUMIN SERPL BCP-MCNC: 1.8 G/DL (ref 3.5–5)
ALP SERPL-CCNC: 95 U/L (ref 34–104)
ALT SERPL W P-5'-P-CCNC: 21 U/L (ref 7–52)
ANION GAP SERPL CALCULATED.3IONS-SCNC: 5 MMOL/L (ref 4–13)
AORTIC ROOT: 3.6 CM
APICAL FOUR CHAMBER EJECTION FRACTION: 77 %
AST SERPL W P-5'-P-CCNC: 76 U/L (ref 13–39)
ATRIAL RATE: 178 BPM
BASOPHILS # BLD AUTO: 0.08 THOUSANDS/ÂΜL (ref 0–0.1)
BASOPHILS NFR BLD AUTO: 1 % (ref 0–1)
BILIRUB SERPL-MCNC: 12.06 MG/DL (ref 0.2–1)
BSA FOR ECHO PROCEDURE: 2.2 M2
BUN SERPL-MCNC: 3 MG/DL (ref 5–25)
CA-I BLD-SCNC: 1.03 MMOL/L (ref 1.12–1.32)
CALCIUM ALBUM COR SERPL-MCNC: 8.9 MG/DL (ref 8.3–10.1)
CALCIUM SERPL-MCNC: 7.1 MG/DL (ref 8.4–10.2)
CHLORIDE SERPL-SCNC: 103 MMOL/L (ref 96–108)
CO2 SERPL-SCNC: 28 MMOL/L (ref 21–32)
CREAT SERPL-MCNC: 0.64 MG/DL (ref 0.6–1.3)
E WAVE DECELERATION TIME: 135 MS
E/A RATIO: 0.7
EOSINOPHIL # BLD AUTO: 0.39 THOUSAND/ÂΜL (ref 0–0.61)
EOSINOPHIL NFR BLD AUTO: 7 % (ref 0–6)
ERYTHROCYTE [DISTWIDTH] IN BLOOD BY AUTOMATED COUNT: 13.2 % (ref 11.6–15.1)
FRACTIONAL SHORTENING: 29 (ref 28–44)
GFR SERPL CREATININE-BSD FRML MDRD: 113 ML/MIN/1.73SQ M
GLUCOSE SERPL-MCNC: 77 MG/DL (ref 65–140)
HCT VFR BLD AUTO: 30.8 % (ref 36.5–49.3)
HGB BLD-MCNC: 10.2 G/DL (ref 12–17)
IMM GRANULOCYTES # BLD AUTO: 0.02 THOUSAND/UL (ref 0–0.2)
IMM GRANULOCYTES NFR BLD AUTO: 0 % (ref 0–2)
INR PPP: 3.09 (ref 0.84–1.19)
INTERVENTRICULAR SEPTUM IN DIASTOLE (PARASTERNAL SHORT AXIS VIEW): 1.1 CM
INTERVENTRICULAR SEPTUM: 1.1 CM (ref 0.6–1.1)
LAAS-AP2: 18.7 CM2
LAAS-AP4: 18.3 CM2
LACTATE SERPL-SCNC: 2 MMOL/L (ref 0.5–2)
LEFT ATRIUM SIZE: 4 CM
LEFT ATRIUM VOLUME (MOD BIPLANE): 57 ML
LEFT ATRIUM VOLUME INDEX (MOD BIPLANE): 25.9 ML/M2
LEFT INTERNAL DIMENSION IN SYSTOLE: 3.4 CM (ref 2.1–4)
LEFT VENTRICLE DIASTOLIC VOLUME (MOD BIPLANE): 79 ML
LEFT VENTRICLE DIASTOLIC VOLUME INDEX (MOD BIPLANE): 35.9 ML/M2
LEFT VENTRICLE SYSTOLIC VOLUME (MOD BIPLANE): 19 ML
LEFT VENTRICLE SYSTOLIC VOLUME INDEX (MOD BIPLANE): 8.6 ML/M2
LEFT VENTRICULAR INTERNAL DIMENSION IN DIASTOLE: 4.8 CM (ref 3.5–6)
LEFT VENTRICULAR POSTERIOR WALL IN END DIASTOLE: 1.6 CM
LEFT VENTRICULAR STROKE VOLUME: 62 ML
LV EF: 76 %
LVSV (TEICH): 62 ML
LYMPHOCYTES # BLD AUTO: 1.45 THOUSANDS/ÂΜL (ref 0.6–4.47)
LYMPHOCYTES NFR BLD AUTO: 24 % (ref 14–44)
MAGNESIUM SERPL-MCNC: 1.3 MG/DL (ref 1.9–2.7)
MCH RBC QN AUTO: 33.8 PG (ref 26.8–34.3)
MCHC RBC AUTO-ENTMCNC: 33.1 G/DL (ref 31.4–37.4)
MCV RBC AUTO: 102 FL (ref 82–98)
MONOCYTES # BLD AUTO: 1.01 THOUSAND/ÂΜL (ref 0.17–1.22)
MONOCYTES NFR BLD AUTO: 17 % (ref 4–12)
MV E'TISSUE VEL-LAT: 11 CM/S
MV E'TISSUE VEL-SEP: 14 CM/S
MV PEAK A VEL: 1.29 M/S
MV PEAK E VEL: 90 CM/S
MV STENOSIS PRESSURE HALF TIME: 39 MS
MV VALVE AREA P 1/2 METHOD: 5.64
NEUTROPHILS # BLD AUTO: 3.08 THOUSANDS/ÂΜL (ref 1.85–7.62)
NEUTS SEG NFR BLD AUTO: 51 % (ref 43–75)
NRBC BLD AUTO-RTO: 0 /100 WBCS
P AXIS: 9 DEGREES
PHOSPHATE SERPL-MCNC: 2.7 MG/DL (ref 2.7–4.5)
PLATELET # BLD AUTO: 58 THOUSANDS/UL (ref 149–390)
PMV BLD AUTO: 9 FL (ref 8.9–12.7)
POTASSIUM SERPL-SCNC: 3.9 MMOL/L (ref 3.5–5.3)
PROCALCITONIN SERPL-MCNC: 0.09 NG/ML
PROT SERPL-MCNC: 5 G/DL (ref 6.4–8.4)
PROTHROMBIN TIME: 32.2 SECONDS (ref 11.6–14.5)
QRS AXIS: 50 DEGREES
QRSD INTERVAL: 88 MS
QT INTERVAL: 356 MS
QTC INTERVAL: 445 MS
RBC # BLD AUTO: 3.02 MILLION/UL (ref 3.88–5.62)
RIGHT ATRIUM AREA SYSTOLE A4C: 9.7 CM2
RIGHT VENTRICLE ID DIMENSION: 3.6 CM
SL CV LEFT ATRIUM LENGTH A2C: 4.8 CM
SL CV LV EF: 70
SL CV PED ECHO LEFT VENTRICLE DIASTOLIC VOLUME (MOD BIPLANE) 2D: 109 ML
SL CV PED ECHO LEFT VENTRICLE SYSTOLIC VOLUME (MOD BIPLANE) 2D: 47 ML
SODIUM SERPL-SCNC: 136 MMOL/L (ref 135–147)
T WAVE AXIS: 5 DEGREES
TRICUSPID ANNULAR PLANE SYSTOLIC EXCURSION: 2.1 CM
VENTRICULAR RATE: 94 BPM
WBC # BLD AUTO: 6.03 THOUSAND/UL (ref 4.31–10.16)

## 2024-03-21 PROCEDURE — 71045 X-RAY EXAM CHEST 1 VIEW: CPT

## 2024-03-21 PROCEDURE — 85025 COMPLETE CBC W/AUTO DIFF WBC: CPT | Performed by: NURSE PRACTITIONER

## 2024-03-21 PROCEDURE — NC001 PR NO CHARGE: Performed by: INTERNAL MEDICINE

## 2024-03-21 PROCEDURE — 97167 OT EVAL HIGH COMPLEX 60 MIN: CPT

## 2024-03-21 PROCEDURE — 93306 TTE W/DOPPLER COMPLETE: CPT

## 2024-03-21 PROCEDURE — 83735 ASSAY OF MAGNESIUM: CPT | Performed by: NURSE PRACTITIONER

## 2024-03-21 PROCEDURE — 80053 COMPREHEN METABOLIC PANEL: CPT | Performed by: NURSE PRACTITIONER

## 2024-03-21 PROCEDURE — 97535 SELF CARE MNGMENT TRAINING: CPT

## 2024-03-21 PROCEDURE — 76705 ECHO EXAM OF ABDOMEN: CPT

## 2024-03-21 PROCEDURE — 97116 GAIT TRAINING THERAPY: CPT

## 2024-03-21 PROCEDURE — 83605 ASSAY OF LACTIC ACID: CPT | Performed by: PHYSICIAN ASSISTANT

## 2024-03-21 PROCEDURE — 94760 N-INVAS EAR/PLS OXIMETRY 1: CPT

## 2024-03-21 PROCEDURE — 84145 PROCALCITONIN (PCT): CPT | Performed by: NURSE PRACTITIONER

## 2024-03-21 PROCEDURE — 85610 PROTHROMBIN TIME: CPT | Performed by: NURSE PRACTITIONER

## 2024-03-21 PROCEDURE — 97163 PT EVAL HIGH COMPLEX 45 MIN: CPT

## 2024-03-21 PROCEDURE — 94660 CPAP INITIATION&MGMT: CPT

## 2024-03-21 PROCEDURE — 84100 ASSAY OF PHOSPHORUS: CPT | Performed by: NURSE PRACTITIONER

## 2024-03-21 PROCEDURE — 82330 ASSAY OF CALCIUM: CPT | Performed by: NURSE PRACTITIONER

## 2024-03-21 RX ORDER — SPIRONOLACTONE 25 MG/1
50 TABLET ORAL DAILY
Status: DISCONTINUED | OUTPATIENT
Start: 2024-03-21 | End: 2024-03-21

## 2024-03-21 RX ORDER — ALBUMIN (HUMAN) 12.5 G/50ML
12.5 SOLUTION INTRAVENOUS ONCE
Status: COMPLETED | OUTPATIENT
Start: 2024-03-21 | End: 2024-03-21

## 2024-03-21 RX ORDER — HEPARIN SODIUM 5000 [USP'U]/ML
5000 INJECTION, SOLUTION INTRAVENOUS; SUBCUTANEOUS EVERY 8 HOURS SCHEDULED
Status: DISCONTINUED | OUTPATIENT
Start: 2024-03-21 | End: 2024-03-29

## 2024-03-21 RX ORDER — MAGNESIUM SULFATE HEPTAHYDRATE 40 MG/ML
2 INJECTION, SOLUTION INTRAVENOUS
Qty: 100 ML | Refills: 0 | Status: COMPLETED | OUTPATIENT
Start: 2024-03-21 | End: 2024-03-21

## 2024-03-21 RX ORDER — CALCIUM GLUCONATE 20 MG/ML
2 INJECTION, SOLUTION INTRAVENOUS ONCE
Status: COMPLETED | OUTPATIENT
Start: 2024-03-21 | End: 2024-03-21

## 2024-03-21 RX ORDER — ALBUMIN, HUMAN INJ 5% 5 %
12.5 SOLUTION INTRAVENOUS ONCE
Status: COMPLETED | OUTPATIENT
Start: 2024-03-21 | End: 2024-03-21

## 2024-03-21 RX ORDER — FUROSEMIDE 10 MG/ML
40 INJECTION INTRAMUSCULAR; INTRAVENOUS ONCE
Status: COMPLETED | OUTPATIENT
Start: 2024-03-21 | End: 2024-03-21

## 2024-03-21 RX ORDER — LORAZEPAM 1 MG/1
2 TABLET ORAL ONCE
Status: COMPLETED | OUTPATIENT
Start: 2024-03-21 | End: 2024-03-21

## 2024-03-21 RX ADMIN — LORAZEPAM 2 MG: 1 TABLET ORAL at 03:55

## 2024-03-21 RX ADMIN — FLUOXETINE 20 MG: 20 CAPSULE ORAL at 08:35

## 2024-03-21 RX ADMIN — MAGNESIUM SULFATE HEPTAHYDRATE 2 G: 40 INJECTION, SOLUTION INTRAVENOUS at 07:57

## 2024-03-21 RX ADMIN — MAGNESIUM SULFATE HEPTAHYDRATE 2 G: 40 INJECTION, SOLUTION INTRAVENOUS at 05:40

## 2024-03-21 RX ADMIN — LACTULOSE 30 G: 10 SOLUTION ORAL at 08:35

## 2024-03-21 RX ADMIN — FUROSEMIDE 60 MG: 20 TABLET ORAL at 11:34

## 2024-03-21 RX ADMIN — CHLORDIAZEPOXIDE HYDROCHLORIDE 50 MG: 25 CAPSULE ORAL at 05:05

## 2024-03-21 RX ADMIN — CHLORDIAZEPOXIDE HYDROCHLORIDE 50 MG: 25 CAPSULE ORAL at 15:15

## 2024-03-21 RX ADMIN — CHLORDIAZEPOXIDE HYDROCHLORIDE 50 MG: 25 CAPSULE ORAL at 22:00

## 2024-03-21 RX ADMIN — LAMOTRIGINE 25 MG: 25 TABLET ORAL at 08:35

## 2024-03-21 RX ADMIN — FOLIC ACID 1 MG: 1 TABLET ORAL at 08:35

## 2024-03-21 RX ADMIN — Medication 1 TABLET: at 08:35

## 2024-03-21 RX ADMIN — FUROSEMIDE 40 MG: 10 INJECTION, SOLUTION INTRAMUSCULAR; INTRAVENOUS at 16:26

## 2024-03-21 RX ADMIN — ALBUMIN (HUMAN) 12.5 G: 12.5 INJECTION, SOLUTION INTRAVENOUS at 16:26

## 2024-03-21 RX ADMIN — SPIRONOLACTONE 50 MG: 25 TABLET, FILM COATED ORAL at 12:25

## 2024-03-21 RX ADMIN — HEPARIN SODIUM 5000 UNITS: 5000 INJECTION INTRAVENOUS; SUBCUTANEOUS at 11:34

## 2024-03-21 RX ADMIN — PANTOPRAZOLE SODIUM 40 MG: 40 TABLET, DELAYED RELEASE ORAL at 05:05

## 2024-03-21 RX ADMIN — ALBUMIN (HUMAN) 12.5 G: 0.25 INJECTION, SOLUTION INTRAVENOUS at 15:16

## 2024-03-21 RX ADMIN — THIAMINE HCL TAB 100 MG 100 MG: 100 TAB at 08:35

## 2024-03-21 RX ADMIN — CALCIUM GLUCONATE 2 G: 20 INJECTION, SOLUTION INTRAVENOUS at 05:40

## 2024-03-21 RX ADMIN — RIFAXIMIN 550 MG: 550 TABLET ORAL at 08:35

## 2024-03-21 RX ADMIN — HEPARIN SODIUM 5000 UNITS: 5000 INJECTION INTRAVENOUS; SUBCUTANEOUS at 22:00
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
## 2024-03-21 NOTE — PLAN OF CARE
Problem: PAIN - ADULT  Goal: Verbalizes/displays adequate comfort level or baseline comfort level  Description: Interventions:  - Encourage patient to monitor pain and request assistance  - Assess pain using appropriate pain scale  - Administer analgesics based on type and severity of pain and evaluate response  - Implement non-pharmacological measures as appropriate and evaluate response  - Consider cultural and social influences on pain and pain management  - Notify physician/advanced practitioner if interventions unsuccessful or patient reports new pain  3/20/2024 2125 by Patrice Wall RN  Outcome: Progressing  3/20/2024 2124 by Patrice Wall RN  Outcome: Progressing     Problem: SAFETY ADULT  Goal: Patient will remain free of falls  Description: INTERVENTIONS:  - Educate patient/family on patient safety including physical limitations  - Instruct patient to call for assistance with activity   - Consult OT/PT to assist with strengthening/mobility   - Keep Call bell within reach  - Keep bed low and locked with side rails adjusted as appropriate  - Keep care items and personal belongings within reach  - Initiate and maintain comfort rounds  - Make Fall Risk Sign visible to staff  - Apply yellow socks and bracelet for high fall risk patients  - Consider moving patient to room near nurses station  Outcome: Progressing     Problem: NEUROSENSORY - ADULT  Goal: Remains free of injury related to seizures activity  Description: INTERVENTIONS  - Maintain airway, patient safety  and administer oxygen as ordered  - Monitor patient for seizure activity, document and report duration and description of seizure to physician/advanced practitioner  - If seizure occurs,  ensure patient safety during seizure  - Reorient patient post seizure  - Seizure pads on all 4 side rails  - Instruct patient/family to notify RN of any seizure activity including if an aura is experienced  - Instruct patient/family to call for assistance with  activity based on nursing assessment  - Administer anti-seizure medications if ordered    Outcome: Progressing  Goal: Achieves maximal functionality and self care  Description: INTERVENTIONS  - Monitor swallowing and airway patency with patient fatigue and changes in neurological status  - Encourage and assist patient to increase activity and self care.   - Encourage visually impaired, hearing impaired and aphasic patients to use assistive/communication devices  Outcome: Progressing     Problem: NEUROSENSORY - ADULT  Goal: Achieves maximal functionality and self care  Description: INTERVENTIONS  - Monitor swallowing and airway patency with patient fatigue and changes in neurological status  - Encourage and assist patient to increase activity and self care.   - Encourage visually impaired, hearing impaired and aphasic patients to use assistive/communication devices  Outcome: Progressing     Problem: GASTROINTESTINAL - ADULT  Goal: Maintains adequate nutritional intake  Description: INTERVENTIONS:  - Monitor percentage of each meal consumed  - Identify factors contributing to decreased intake, treat as appropriate  - Assist with meals as needed  - Monitor I&O, weight, and lab values if indicated  - Obtain nutrition services referral as needed  Outcome: Progressing     Problem: METABOLIC, FLUID AND ELECTROLYTES - ADULT  Goal: Fluid balance maintained  Description: INTERVENTIONS:  - Monitor labs   - Monitor I/O and WT  - Instruct patient on fluid and nutrition as appropriate  - Assess for signs & symptoms of volume excess or deficit  Outcome: Progressing

## 2024-03-21 NOTE — ASSESSMENT & PLAN NOTE
Reports last drink one day prior to admission. Etoh level 120 on presentation.  Reports drinking four 16-ounce beers per day.  Admits to etoh withdrawal in the past, denies seizures    - CIWA protocol initiated  - Librium 25 mg PO Q 8 hours  - Thiamine/folic acid/MVI daily  - Seizure and aspiration precautions

## 2024-03-21 NOTE — PLAN OF CARE
Problem: PAIN - ADULT  Goal: Verbalizes/displays adequate comfort level or baseline comfort level  Description: Interventions:  - Encourage patient to monitor pain and request assistance  - Assess pain using appropriate pain scale  - Administer analgesics based on type and severity of pain and evaluate response  - Implement non-pharmacological measures as appropriate and evaluate response  - Consider cultural and social influences on pain and pain management  - Notify physician/advanced practitioner if interventions unsuccessful or patient reports new pain  Outcome: Progressing     Problem: INFECTION - ADULT  Goal: Absence or prevention of progression during hospitalization  Description: INTERVENTIONS:  - Assess and monitor for signs and symptoms of infection  - Monitor lab/diagnostic results  - Monitor all insertion sites, i.e. indwelling lines, tubes, and drains  - Monitor endotracheal if appropriate and nasal secretions for changes in amount and color  - [place] appropriate cooling/warming therapies per order  - Administer medications as ordered  - Instruct and encourage patient and family to use good hand hygiene technique  - Identify and instruct in appropriate isolation precautions for identified infection/condition  Outcome: Progressing  Goal: Absence of fever/infection during neutropenic period  Description: INTERVENTIONS:  - Monitor WBC    Outcome: Progressing     Problem: SAFETY ADULT  Goal: Patient will remain free of falls  Description: INTERVENTIONS:  - Educate patient/family on patient safety including physical limitations  - Instruct patient to call for assistance with activity   - Consult OT/PT to assist with strengthening/mobility   - Keep Call bell within reach  - Keep bed low and locked with side rails adjusted as appropriate  - Keep care items and personal belongings within reach  - Initiate and maintain comfort rounds  - Make Fall Risk Sign visible to staff  - Offer Toileting every 4 Hours,  in advance of need  - Apply yellow socks and bracelet for high fall risk patients  - Consider moving patient to room near nurses station  Outcome: Progressing  Goal: Maintain or return to baseline ADL function  Description: INTERVENTIONS:  -  Assess patient's ability to carry out ADLs; assess patient's baseline for ADL function and identify physical deficits which impact ability to perform ADLs (bathing, care of mouth/teeth, toileting, grooming, dressing, etc.)  - Assess/evaluate cause of self-care deficits   - Assess range of motion  - Assess patient's mobility; develop plan if impaired  - Assess patient's need for assistive devices and provide as appropriate  - Encourage maximum independence but intervene and supervise when necessary  - Involve family in performance of ADLs  - Assess for home care needs following discharge   - Consider OT consult to assist with ADL evaluation and planning for discharge  - Provide patient education as appropriate  Outcome: Progressing  Goal: Maintains/Returns to pre admission functional level  Description: INTERVENTIONS:  - Perform AM-PAC 6 Click Basic Mobility/ Daily Activity assessment daily.  - Set and communicate daily mobility goal to care team and patient/family/caregiver.   - Collaborate with rehabilitation services on mobility goals if consulted  - Perform Range of Motion 4 times a day.  - Reposition patient every 2 hours.  - Dangle patient 2 times a day  - Record patient progress and toleration of activity level   Outcome: Progressing     Problem: DISCHARGE PLANNING  Goal: Discharge to home or other facility with appropriate resources  Description: INTERVENTIONS:  - Identify barriers to discharge w/patient and caregiver  - Arrange for needed discharge resources and transportation as appropriate  - Identify discharge learning needs (meds, wound care, etc.)  - Arrange for interpretive services to assist at discharge as needed  - Refer to Case Management Department for  coordinating discharge planning if the patient needs post-hospital services based on physician/advanced practitioner order or complex needs related to functional status, cognitive ability, or social support system  Outcome: Progressing     Problem: Knowledge Deficit  Goal: Patient/family/caregiver demonstrates understanding of disease process, treatment plan, medications, and discharge instructions  Description: Complete learning assessment and assess knowledge base.  Interventions:  - Provide teaching at level of understanding  - Provide teaching via preferred learning methods  Outcome: Progressing     Problem: Prexisting or High Potential for Compromised Skin Integrity  Goal: Skin integrity is maintained or improved  Description: INTERVENTIONS:  - Identify patients at risk for skin breakdown  - Assess and monitor skin integrity  - Assess and monitor nutrition and hydration status  - Monitor labs   - Assess for incontinence   - Turn and reposition patient  - Assist with mobility/ambulation  - Relieve pressure over bony prominences  - Avoid friction and shearing  - Provide appropriate hygiene as needed including keeping skin clean and dry  - Evaluate need for skin moisturizer/barrier cream  - Collaborate with interdisciplinary team   - Patient/family teaching  - Consider wound care consult   Outcome: Progressing     Problem: NEUROSENSORY - ADULT  Goal: Remains free of injury related to seizures activity  Description: INTERVENTIONS  - Maintain airway, patient safety  and administer oxygen as ordered  - Monitor patient for seizure activity, document and report duration and description of seizure to physician/advanced practitioner  - If seizure occurs,  ensure patient safety during seizure  - Reorient patient post seizure  - Seizure pads on all 4 side rails  - Instruct patient/family to notify RN of any seizure activity including if an aura is experienced      Outcome: Progressing  Goal: Achieves maximal functionality  and self care  Description: INTERVENTIONS  - Monitor swallowing and airway patency with patient fatigue and changes in neurological status  - Encourage and assist patient to increase activity and self care.   - Encourage visually impaired, hearing impaired and aphasic patients to use assistive/communication devices  Outcome: Progressing     Problem: GASTROINTESTINAL - ADULT  Goal: Maintains adequate nutritional intake  Description: INTERVENTIONS:  - Monitor percentage of each meal consumed  - Identify factors contributing to decreased intake, treat as appropriate  - Assist with meals as needed  - Monitor I&O, weight, and lab values if indicated  - Obtain nutrition services referral as needed  Outcome: Progressing     Problem: METABOLIC, FLUID AND ELECTROLYTES - ADULT  Goal: Electrolytes maintained within normal limits  Description: INTERVENTIONS:  - Monitor labs and assess patient for signs and symptoms of electrolyte imbalances  - Administer electrolyte replacement as ordered  - Monitor response to electrolyte replacements, including repeat lab results as appropriate  - Instruct patient on fluid and nutrition as appropriate  Outcome: Progressing  Goal: Fluid balance maintained  Description: INTERVENTIONS:  - Monitor labs   - Monitor I/O and WT  - Instruct patient on fluid and nutrition as appropriate  - Assess for signs & symptoms of volume excess or deficit  Outcome: Progressing  Goal: Glucose maintained within target range  Description: INTERVENTIONS:  - Monitor Blood Glucose as ordered  - Assess for signs and symptoms of hyperglycemia and hypoglycemia  - Administer ordered medications to maintain glucose within target range  - Assess nutritional intake and initiate nutrition service referral as needed  Outcome: Progressing

## 2024-03-21 NOTE — ASSESSMENT & PLAN NOTE
Reports last drink one day prior to admission. Etoh level 120 on presentation.  Reports drinking four 16-ounce beers per day.  Admits to etoh withdrawal in the past, denies seizures    - CIWA protocol initiated  - Librium 50 mg PO Q 8 hours; wean to 25 Q8H today, with goal to wean off by 3/25/24  - Thiamine/folic acid/MVI daily  - Seizure and aspiration precautions  - Consider referring to rehab post-discharge

## 2024-03-21 NOTE — UTILIZATION REVIEW
NOTIFICATION OF INPATIENT ADMISSION   AUTHORIZATION REQUEST   SERVICING FACILITY:   [place] [address]  Tax ID: [id number]  NPI: [id number] ATTENDING PROVIDER:  Attending Name and NPI#: Rosi Brown Md [[id number]]  Address: [address] [place] [address]  Phone: [phone]   ADMISSION INFORMATION:  Place of Service: Inpatient [facility]  Place of Service Code: 21  Inpatient Admission Date/Time: 3/20/24  4:24 PM  Discharge Date/Time: No discharge date for patient encounter.  Admitting Diagnosis Code/Description:  Hyperbilirubinemia [E80.6]  Cirrhosis (HCC) [K74.60]  SOB (shortness of breath) [R06.02]  Pleural effusion [J90]  Gallstones [K80.20]     UTILIZATION REVIEW CONTACT:  Amber Olson, Utilization   Network Utilization Review Department  Phone: [phone]  Fax [phone]  Email: [email or web address]  Contact for approvals/pending authorizations, clinical reviews, and discharge.     PHYSICIAN ADVISORY SERVICES:  Medical Necessity Denial & [id number] Review  Phone: [phone]  Fax: [phone]  Email: [email or web address]     DISCHARGE SUPPORT TEAM:  For Patients Discharge Needs & Updates  Phone: [phone] opt. 2 Fax: [phone]  Email: [email or web address]

## 2024-03-21 NOTE — ASSESSMENT & PLAN NOTE
Elevated T bili of 15.33 on presentation  Likely secondary to cirrhosis  Noncompliant with PTA meds except Lactulose  Continue PTA Rifaximin & Lactulose    - RUQ US   - Trend labs

## 2024-03-21 NOTE — ASSESSMENT & PLAN NOTE
Also complains of anxiety  Non-compliant PTA Prozac and Lamictal  Brother recently committed suicide  Going through divorce  Lives alone. Minimal/no social support    -Continue home meds  - Refer to Psych/BH outpatient  - Consult SW about transportation
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
## 2024-03-21 NOTE — PLAN OF CARE
Problem: OCCUPATIONAL THERAPY ADULT  Goal: Performs self-care activities at highest level of function for planned discharge setting.  See evaluation for individualized goals.  Description: Treatment Interventions: ADL retraining, Functional transfer training, UE strengthening/ROM, Endurance training, Patient/family training, Equipment evaluation/education, Compensatory technique education, Continued evaluation, Energy conservation, Activityengagement          See flowsheet documentation for full assessment, interventions and recommendations.   Outcome: Progressing  Note: Limitation: Decreased ADL status, Decreased UE strength, Decreased Safe judgement during ADL, Decreased endurance, Decreased self-care trans, Decreased high-level ADLs  Prognosis: Fair  Assessment: Pt is a 53 y.o. male, admitted to [facility] 3/20/2024 d/t experiencing SOB. Dx: R pleural effusion. Pt with PMHx impacting their performance during ADL tasks, including: lactic acid acidosis, serum total bilirubin elevated, hyponatremia, elevated INR, SERGEI, acute respiratory failure with hypoxia, bipolar depression, ambulatory dysfunction, SIRS, thrombocytopenia, R pleural effusion, alcohol abuse, HTN, liver failure, splenic rupture, L sided rib fracture, GERD, sacral fracture, brain surgery. Prior to admission to the hospital Pt was performing ADLs without physical assistance. IADLs without physical assistance. Functional transfers/ambulation without physical assistance. Cognitive status PTA was WFL. OT order placed to assess Pt's ADLs, cognitive status, and performance during functional tasks in order to maximize safety and independence while making most appropriate d/c recommendations. PT/OT co-evaluation completed at this time d/t significant mobility deficits and safety concerns. Pt's clinical presentation is currently unstable/unpredictable given new onset deficits that affect Pt's occupational performance and ability to safely return to [facility] including  decrease activity tolerance, decrease standing balance, decrease performance during ADL tasks, decrease generalized strength, decrease activity engagement, decrease performance during functional transfers, steps to enter home, limited family support, high fall risk, and limited insight to deficits combined with medical complications of abnormal renal lab values, abnormal H&H, abnormal CBC, abnormal sodium values, abnormal potassium values, edema/swelling, elevated BNP, wounds, decreased skin integrity, and need for input for mobility technique/safety. Personal factors affecting Pt at time of initial evaluation include: hx of non-compliance, depression, step(s) to enter environment, limited home support, inability to perform current job functions, inability to perform IADLs, new need for AD, inability to navigate community distances, limited insight into impairments, decreased initiation and engagement, and recent fall(s)/fall history. Pt will benefit from continued skilled OT services to address deficits as defined above and to maximize level independence/participation during ADLs and functional tasks to facilitate return toward PLOF and improved quality of life. From an occupational therapy standpoint, Level II (Moderate Resource Intensity is recommended upon d/c.     Rehab Resource Intensity Level, OT: II (Moderate Resource Intensity)

## 2024-03-21 NOTE — PLAN OF CARE
Problem: PHYSICAL THERAPY ADULT  Goal: Performs mobility at highest level of function for planned discharge setting.  See evaluation for individualized goals.  Description: Treatment/Interventions: ADL retraining, Functional transfer training, LE strengthening/ROM, Elevations, Therapeutic exercise, Endurance training, Patient/family training, Equipment eval/education, Bed mobility, Gait training, Compensatory technique education, Spoke to nursing, OT          See flowsheet documentation for full assessment, interventions and recommendations.  3/21/2024 1615 by Giovanni Taylor PT  Outcome: Progressing  Note: Prognosis: Fair  Problem List: Decreased strength, Decreased endurance, Impaired balance, Decreased mobility, Impaired sensation, Obesity (LE edema)  Pt tolerates tx session fair.  Interventions consisting of gait and transfer training with use of RW as well as bed mobility.  Pt limited by standing balance deficits, general deconditioning/impaired activity tolerance, LE edema.  Pt ambulates 19 ft x 1 using RW and SBA.  Pt performs transfer SBA with RW and sit to supine SPV.  SpO2 remains >90% throughout.  Barriers to Discharge: Other (Comment)  Barriers to Discharge Comments: lives alone, standing balance deficits, fall risk, LAMONTE  Rehab Resource Intensity Level, PT: II (Moderate Resource Intensity)    See flowsheet documentation for full assessment.
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
## 2024-03-21 NOTE — CASE MANAGEMENT
Case Management Discharge Planning Note    Patient name Curt Dominguez  Location /-01 MRN [id number]  : 1970 Date 3/21/2024       Current Admission Date: 3/20/2024  Current Admission Diagnosis:Pleural effusion, right   Patient Active Problem List    Diagnosis Date Noted    Alcohol abuse 2024    Pleural effusion, right 2024    Alcoholic cirrhosis (HCC) 2024    Thrombocytopenia (HCC) 2024    Acute respiratory failure with hypoxia (HCC) 2024    SERGEI (obstructive sleep apnea) 2024    Elevated INR 2024    Serum total bilirubin elevated 2024    Ambulatory dysfunction 2023    Bipolar depression (HCC) 2014      LOS (days): 1  Geometric Mean LOS (GMLOS) (days):   Days to GMLOS:     OBJECTIVE:  Risk of Unplanned Readmission Score: 16.14         Current admission status: Inpatient   Preferred Pharmacy:   [facility] [address] [facility]  [address] [address]  Phone: [phone] Fax: [phone]    [facility]/pharmacy #[id number] [place] [address]  Phone: [phone] Fax: [phone]    Primary Care Provider: Leonela Oseguera MD    Primary Insurance: MARCIAL PEREIRA  Secondary Insurance:     DISCHARGE DETAILS:     CM received call from Cesar Obrien with d/a office - they received warm handoff referral and wanted to schedule to meet with patient. They are scheduled for tomorrow 930/10am. CM updated Cesar on patient's room number.     CM to follow patient's care and discharge needs.

## 2024-03-21 NOTE — ASSESSMENT & PLAN NOTE
Secondary to cirrhosis  Sodium 130 on presentation  Resolved after IVF  - Trend sodium  - Neuro checks

## 2024-03-21 NOTE — ASSESSMENT & PLAN NOTE
Tachycardia, tachypnea, and Lactic acidosis suspected due to right pleural effusion  - CT CAP completed - Large right effusion opacifies the right hemithorax with shift of the mediastinum to the left. No obvious mass is seen. A few air bronchograms are noted. Infection/empyema is not excluded given the degree of pleural fluid. Underlying neoplasm could also be present. Further pulmonary evaluation is advised.  - Lactic acidosis resolved, no leukocytosis or fever, procalcitonin w/i normal limits  - Urinalysis showed no leukocytosis, nitrites, or bacteria  - Follow up on cultures: blood & pleural fluid  - Discontinue CTX
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
## 2024-03-21 NOTE — ASSESSMENT & PLAN NOTE
Lactic acid 4.4 on presentation  The 30ml/kg fluid bolus was not given to the patient despite hypotension and/or significantly elevated lactate of ? 4 and/or presence of septic shock due to: Concern for fluid/volume overload. IR was consulted and chest tube was placed for significantly large right pleural effusion. Lasix also administered.  Resolved (2.0 on 3/21/24)

## 2024-03-21 NOTE — ASSESSMENT & PLAN NOTE
Secondary to cirrhosis  INR 2.84 on presentation  PLT 58 (cutoff 50)    - Trend INR  - Start pharmacological DVT Ppx

## 2024-03-21 NOTE — PLAN OF CARE
Problem: PHYSICAL THERAPY ADULT  Goal: Performs mobility at highest level of function for planned discharge setting.  See evaluation for individualized goals.  Description: Treatment/Interventions: ADL retraining, Functional transfer training, LE strengthening/ROM, Elevations, Therapeutic exercise, Endurance training, Patient/family training, Equipment eval/education, Bed mobility, Gait training, Compensatory technique education, Spoke to nursing, OT          See flowsheet documentation for full assessment, interventions and recommendations.  Note: Prognosis: Fair  Problem List: Decreased strength, Decreased endurance, Impaired balance, Decreased mobility, Impaired sensation, Obesity (LE edema)  Assessment: Pt is a 53 y.o. male seen for PT evaluation s/p admission to [facility] on 3/20/2024 with Pleural effusion, right.  Order placed for PT services.  Upon evaluation: Pt is presenting with impaired functional mobility due to decreased strength, decreased endurance, impaired balance, gait deviations, altered sensation, fall risk, and LE edema requiring  SPV assistance for bed mobility and min - steadying - standby assistance for transfers and ambulation with no AD vs. RW . Pt's clinical presentation is currently unstable/unpredictable given the functional mobility deficits above, especially decreased activity tolerance and decreased functional mobility tolerance, coupled with fall risks as indicated by AM-PAC 6-Clicks: 18/24 as well as hx of falls and obesity and combined with medical complications of abnormal CBC, increased RR, and need for input for mobility technique/safety.  Pt's PMHx and comorbidities that may affect physical performance and progress include: depression, HTN, obesity, and neuropathy. Personal factors affecting pt at time of IE include: questionable non-compliance, anxiety, depression, step(s) to enter environment, inability to perform IADLs, inability to perform ADLs,  inability to navigate level surfaces without external assistance, inability to navigate community distances, recent fall(s)/fall history, and ETOH use. Pt will benefit from continued skilled PT services to address deficits as defined above and to maximize level of functional mobility to facilitate return toward PLOF and improved QOL. From PT/mobility standpoint, recommendation at time of d/c would be Level II (Moderate Resource Intensity pending progress in order to reduce fall risk and maximize pt's functional independence and consistency with mobility in order to facilitate return to PLOF.  Recommend trial with walker next 1-2 sessions, ther ex next 1-2 sessions, and stair navigation .  Barriers to Discharge: Other (Comment)  Barriers to Discharge Comments: lives alone, standing balance deficits, fall risk, LAMONTE  Rehab Resource Intensity Level, PT: II (Moderate Resource Intensity)    See flowsheet documentation for full assessment.
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
## 2024-03-22 ENCOUNTER — APPOINTMENT (INPATIENT)
Dept: RADIOLOGY | Facility: HOSPITAL | Age: 54
End: 2024-03-22
Payer: COMMERCIAL

## 2024-03-22 PROBLEM — D53.9 MACROCYTIC ANEMIA: Status: ACTIVE | Noted: 2024-03-22

## 2024-03-22 PROBLEM — E87.6 HYPOKALEMIA: Status: ACTIVE | Noted: 2024-03-22

## 2024-03-22 PROBLEM — J96.01 ACUTE RESPIRATORY FAILURE WITH HYPOXIA (HCC): Status: RESOLVED | Noted: 2024-03-20 | Resolved: 2024-03-22

## 2024-03-22 PROBLEM — K21.9 GERD (GASTROESOPHAGEAL REFLUX DISEASE): Status: ACTIVE | Noted: 2024-03-22

## 2024-03-22 PROBLEM — I10 HYPERTENSION: Status: ACTIVE | Noted: 2024-03-22

## 2024-03-22 LAB
ALBUMIN FLD-MCNC: <1.5 G/DL
ALBUMIN SERPL BCP-MCNC: 1.8 G/DL (ref 3.5–5)
ALP SERPL-CCNC: 96 U/L (ref 34–104)
ALT SERPL W P-5'-P-CCNC: 17 U/L (ref 7–52)
ANION GAP SERPL CALCULATED.3IONS-SCNC: 5 MMOL/L (ref 4–13)
AST SERPL W P-5'-P-CCNC: 57 U/L (ref 13–39)
BASOPHILS # BLD AUTO: 0.07 THOUSANDS/ÂΜL (ref 0–0.1)
BASOPHILS NFR BLD AUTO: 1 % (ref 0–1)
BILIRUB SERPL-MCNC: 11.45 MG/DL (ref 0.2–1)
BUN SERPL-MCNC: 5 MG/DL (ref 5–25)
CALCIUM ALBUM COR SERPL-MCNC: 8.9 MG/DL (ref 8.3–10.1)
CALCIUM SERPL-MCNC: 7.1 MG/DL (ref 8.4–10.2)
CHLORIDE SERPL-SCNC: 101 MMOL/L (ref 96–108)
CO2 SERPL-SCNC: 29 MMOL/L (ref 21–32)
CREAT SERPL-MCNC: 0.79 MG/DL (ref 0.6–1.3)
EOSINOPHIL # BLD AUTO: 0.35 THOUSAND/ÂΜL (ref 0–0.61)
EOSINOPHIL NFR BLD AUTO: 5 % (ref 0–6)
ERYTHROCYTE [DISTWIDTH] IN BLOOD BY AUTOMATED COUNT: 13 % (ref 11.6–15.1)
FERRITIN SERPL-MCNC: 166 NG/ML (ref 24–336)
FOLATE SERPL-MCNC: 5.6 NG/ML
GFR SERPL CREATININE-BSD FRML MDRD: 103 ML/MIN/1.73SQ M
GLUCOSE SERPL-MCNC: 135 MG/DL (ref 65–140)
HCT VFR BLD AUTO: 28.7 % (ref 36.5–49.3)
HGB BLD-MCNC: 9.5 G/DL (ref 12–17)
IMM GRANULOCYTES # BLD AUTO: 0.04 THOUSAND/UL (ref 0–0.2)
IMM GRANULOCYTES NFR BLD AUTO: 1 % (ref 0–2)
INR PPP: 3.46 (ref 0.84–1.19)
IRON SERPL-MCNC: 71 UG/DL (ref 50–212)
LYMPHOCYTES # BLD AUTO: 1.39 THOUSANDS/ÂΜL (ref 0.6–4.47)
LYMPHOCYTES NFR BLD AUTO: 21 % (ref 14–44)
MCH RBC QN AUTO: 34.2 PG (ref 26.8–34.3)
MCHC RBC AUTO-ENTMCNC: 33.1 G/DL (ref 31.4–37.4)
MCV RBC AUTO: 103 FL (ref 82–98)
MONOCYTES # BLD AUTO: 1.15 THOUSAND/ÂΜL (ref 0.17–1.22)
MONOCYTES NFR BLD AUTO: 17 % (ref 4–12)
NEUTROPHILS # BLD AUTO: 3.79 THOUSANDS/ÂΜL (ref 1.85–7.62)
NEUTS SEG NFR BLD AUTO: 55 % (ref 43–75)
NRBC BLD AUTO-RTO: 0 /100 WBCS
PLATELET # BLD AUTO: 65 THOUSANDS/UL (ref 149–390)
PMV BLD AUTO: 9.2 FL (ref 8.9–12.7)
POTASSIUM SERPL-SCNC: 3.3 MMOL/L (ref 3.5–5.3)
PROT SERPL-MCNC: 4.6 G/DL (ref 6.4–8.4)
PROTHROMBIN TIME: 35.1 SECONDS (ref 11.6–14.5)
RBC # BLD AUTO: 2.78 MILLION/UL (ref 3.88–5.62)
SODIUM SERPL-SCNC: 135 MMOL/L (ref 135–147)
TIBC SERPL-MCNC: <126 UG/DL (ref 250–450)
UIBC SERPL-MCNC: <55 UG/DL (ref 155–355)
VIT B12 SERPL-MCNC: 1049 PG/ML (ref 180–914)
WBC # BLD AUTO: 6.79 THOUSAND/UL (ref 4.31–10.16)

## 2024-03-22 PROCEDURE — 80053 COMPREHEN METABOLIC PANEL: CPT | Performed by: PHYSICIAN ASSISTANT

## 2024-03-22 PROCEDURE — 94660 CPAP INITIATION&MGMT: CPT

## 2024-03-22 PROCEDURE — NC001 PR NO CHARGE: Performed by: STUDENT IN AN ORGANIZED HEALTH CARE EDUCATION/TRAINING PROGRAM

## 2024-03-22 PROCEDURE — 99233 SBSQ HOSP IP/OBS HIGH 50: CPT | Performed by: STUDENT IN AN ORGANIZED HEALTH CARE EDUCATION/TRAINING PROGRAM

## 2024-03-22 PROCEDURE — 85610 PROTHROMBIN TIME: CPT | Performed by: PHYSICIAN ASSISTANT

## 2024-03-22 PROCEDURE — 83550 IRON BINDING TEST: CPT | Performed by: PHYSICIAN ASSISTANT

## 2024-03-22 PROCEDURE — 83540 ASSAY OF IRON: CPT | Performed by: PHYSICIAN ASSISTANT

## 2024-03-22 PROCEDURE — 94760 N-INVAS EAR/PLS OXIMETRY 1: CPT

## 2024-03-22 PROCEDURE — 82607 VITAMIN B-12: CPT | Performed by: PHYSICIAN ASSISTANT

## 2024-03-22 PROCEDURE — 71045 X-RAY EXAM CHEST 1 VIEW: CPT

## 2024-03-22 PROCEDURE — 85025 COMPLETE CBC W/AUTO DIFF WBC: CPT | Performed by: PHYSICIAN ASSISTANT

## 2024-03-22 PROCEDURE — 82746 ASSAY OF FOLIC ACID SERUM: CPT | Performed by: PHYSICIAN ASSISTANT

## 2024-03-22 PROCEDURE — 82728 ASSAY OF FERRITIN: CPT | Performed by: PHYSICIAN ASSISTANT

## 2024-03-22 PROCEDURE — 82042 OTHER SOURCE ALBUMIN QUAN EA: CPT | Performed by: HOSPITALIST

## 2024-03-22 RX ORDER — LANOLIN ALCOHOL/MO/W.PET/CERES
400 CREAM (GRAM) TOPICAL DAILY
Status: DISCONTINUED | OUTPATIENT
Start: 2024-03-22 | End: 2024-03-29 | Stop reason: HOSPADM

## 2024-03-22 RX ORDER — CHLORDIAZEPOXIDE HYDROCHLORIDE 25 MG/1
25 CAPSULE, GELATIN COATED ORAL EVERY 8 HOURS SCHEDULED
Status: COMPLETED | OUTPATIENT
Start: 2024-03-22 | End: 2024-03-23

## 2024-03-22 RX ORDER — CHLORDIAZEPOXIDE HYDROCHLORIDE 5 MG/1
5 CAPSULE, GELATIN COATED ORAL ONCE
Status: COMPLETED | OUTPATIENT
Start: 2024-03-25 | End: 2024-03-24

## 2024-03-22 RX ORDER — CHLORDIAZEPOXIDE HYDROCHLORIDE 5 MG/1
10 CAPSULE, GELATIN COATED ORAL EVERY 12 HOURS
Status: COMPLETED | OUTPATIENT
Start: 2024-03-23 | End: 2024-03-24

## 2024-03-22 RX ORDER — CHLORDIAZEPOXIDE HYDROCHLORIDE 25 MG/1
25 CAPSULE, GELATIN COATED ORAL EVERY 8 HOURS SCHEDULED
Status: DISCONTINUED | OUTPATIENT
Start: 2024-03-22 | End: 2024-03-22

## 2024-03-22 RX ORDER — ALBUMIN, HUMAN INJ 5% 5 %
25 SOLUTION INTRAVENOUS ONCE
Status: DISCONTINUED | OUTPATIENT
Start: 2024-03-23 | End: 2024-03-22

## 2024-03-22 RX ORDER — LORAZEPAM 1 MG/1
2 TABLET ORAL ONCE
Status: COMPLETED | OUTPATIENT
Start: 2024-03-22 | End: 2024-03-22

## 2024-03-22 RX ORDER — MAGNESIUM SULFATE HEPTAHYDRATE 40 MG/ML
2 INJECTION, SOLUTION INTRAVENOUS ONCE
Status: COMPLETED | OUTPATIENT
Start: 2024-03-22 | End: 2024-03-22

## 2024-03-22 RX ORDER — SPIRONOLACTONE 25 MG/1
50 TABLET ORAL DAILY
Status: DISCONTINUED | OUTPATIENT
Start: 2024-03-22 | End: 2024-03-24

## 2024-03-22 RX ORDER — CARVEDILOL 6.25 MG/1
6.25 TABLET ORAL 2 TIMES DAILY WITH MEALS
Status: DISCONTINUED | OUTPATIENT
Start: 2024-03-22 | End: 2024-03-29 | Stop reason: HOSPADM

## 2024-03-22 RX ORDER — POTASSIUM CHLORIDE 20 MEQ/1
40 TABLET, EXTENDED RELEASE ORAL 2 TIMES DAILY
Status: COMPLETED | OUTPATIENT
Start: 2024-03-22 | End: 2024-03-22

## 2024-03-22 RX ORDER — ALBUMIN, HUMAN INJ 5% 5 %
25 SOLUTION INTRAVENOUS ONCE
Status: DISCONTINUED | OUTPATIENT
Start: 2024-03-22 | End: 2024-03-22

## 2024-03-22 RX ORDER — POTASSIUM CHLORIDE 20 MEQ/1
40 TABLET, EXTENDED RELEASE ORAL ONCE
Status: COMPLETED | OUTPATIENT
Start: 2024-03-22 | End: 2024-03-22

## 2024-03-22 RX ORDER — ALBUMIN, HUMAN INJ 5% 5 %
25 SOLUTION INTRAVENOUS DAILY
Status: COMPLETED | OUTPATIENT
Start: 2024-03-22 | End: 2024-03-23

## 2024-03-22 RX ADMIN — CARVEDILOL 6.25 MG: 6.25 TABLET, FILM COATED ORAL at 17:30

## 2024-03-22 RX ADMIN — LORAZEPAM 2 MG: 1 TABLET ORAL at 14:17

## 2024-03-22 RX ADMIN — CHLORDIAZEPOXIDE HYDROCHLORIDE 50 MG: 25 CAPSULE ORAL at 05:00

## 2024-03-22 RX ADMIN — FLUOXETINE 20 MG: 20 CAPSULE ORAL at 09:30

## 2024-03-22 RX ADMIN — POTASSIUM CHLORIDE 40 MEQ: 1500 TABLET, EXTENDED RELEASE ORAL at 09:29

## 2024-03-22 RX ADMIN — FUROSEMIDE 60 MG: 20 TABLET ORAL at 12:57

## 2024-03-22 RX ADMIN — LACTULOSE 30 G: 10 SOLUTION ORAL at 09:30

## 2024-03-22 RX ADMIN — HEPARIN SODIUM 5000 UNITS: 5000 INJECTION INTRAVENOUS; SUBCUTANEOUS at 21:18

## 2024-03-22 RX ADMIN — THIAMINE HCL TAB 100 MG 100 MG: 100 TAB at 09:29

## 2024-03-22 RX ADMIN — POTASSIUM CHLORIDE 40 MEQ: 1500 TABLET, EXTENDED RELEASE ORAL at 17:52

## 2024-03-22 RX ADMIN — MAGNESIUM SULFATE HEPTAHYDRATE 2 G: 40 INJECTION, SOLUTION INTRAVENOUS at 07:26

## 2024-03-22 RX ADMIN — POTASSIUM CHLORIDE 40 MEQ: 1500 TABLET, EXTENDED RELEASE ORAL at 04:54

## 2024-03-22 RX ADMIN — Medication 1 TABLET: at 09:30

## 2024-03-22 RX ADMIN — FOLIC ACID 1 MG: 1 TABLET ORAL at 09:29

## 2024-03-22 RX ADMIN — HEPARIN SODIUM 5000 UNITS: 5000 INJECTION INTRAVENOUS; SUBCUTANEOUS at 14:17

## 2024-03-22 RX ADMIN — ALBUMIN (HUMAN) 25 G: 12.5 INJECTION, SOLUTION INTRAVENOUS at 12:58

## 2024-03-22 RX ADMIN — HEPARIN SODIUM 5000 UNITS: 5000 INJECTION INTRAVENOUS; SUBCUTANEOUS at 05:00

## 2024-03-22 RX ADMIN — CHLORDIAZEPOXIDE HYDROCHLORIDE 25 MG: 25 CAPSULE ORAL at 21:17

## 2024-03-22 RX ADMIN — LAMOTRIGINE 25 MG: 25 TABLET ORAL at 09:30

## 2024-03-22 RX ADMIN — Medication 400 MG: at 12:57

## 2024-03-22 RX ADMIN — PANTOPRAZOLE SODIUM 40 MG: 40 TABLET, DELAYED RELEASE ORAL at 05:00

## 2024-03-22 RX ADMIN — SPIRONOLACTONE 50 MG: 25 TABLET, FILM COATED ORAL at 12:57

## 2024-03-22 RX ADMIN — CHLORDIAZEPOXIDE HYDROCHLORIDE 25 MG: 25 CAPSULE ORAL at 14:17

## 2024-03-22 RX ADMIN — RIFAXIMIN 550 MG: 550 TABLET ORAL at 09:29

## 2024-03-22 NOTE — CASE MANAGEMENT
Case Management Discharge Planning Note    Patient name Curt Dominguez  Location /-01 MRN [id number]  : 1970 Date 3/22/2024       Current Admission Date: 3/20/2024  Current Admission Diagnosis:Pleural effusion, right   Patient Active Problem List    Diagnosis Date Noted    Macrocytic anemia 2024    Hypokalemia 2024    Hypertension 2024    GERD (gastroesophageal reflux disease) 2024    Alcohol abuse 2024    Pleural effusion, right 2024    Alcoholic cirrhosis (HCC) 2024    Thrombocytopenia (HCC) 2024    SERGEI (obstructive sleep apnea) 2024    Elevated INR 2024    Serum total bilirubin elevated 2024    Ambulatory dysfunction 2023    Bipolar depression (HCC) 2014      LOS (days): 2  Geometric Mean LOS (GMLOS) (days):   Days to GMLOS:     OBJECTIVE:  Risk of Unplanned Readmission Score: 19.96         Current admission status: Inpatient   Preferred Pharmacy:   [facility] [address] [facility]  [address] [facility] [address]  Phone: [phone] Fax: [phone]    [facility]/pharmacy #[facility] [place], [place] - [address] [place] [id number]  Phone: [phone] Fax: [phone]    Primary Care Provider: Leonela Oseguera MD    Primary Insurance: [facility]  Secondary Insurance:     DISCHARGE DETAILS:    CM Met with patient to review STR vs HHC. Patient desires HHC, if at all possible when medically stable but recognizing he required assist of 2 for bathroom earlier in day for mobility.   CM submitted blanket HHC referral in A[facility] should patient discharge with HHC.  CM to follow.       CM also discussed with patient prior CPAP use as CM was consulted for DME.  Patient indicated he had CPAP 8-9 years ago following sleep study at [facility] but has no idea which company provided DME.  CM explained to patient he will require new outpatient  sleep study outside hospital to assess for CPAP eligibility. Patient understood.

## 2024-03-22 NOTE — ASSESSMENT & PLAN NOTE
Non compliant with CPAP at home; he turned in his machine b/c the noise bothered his wife  Agreed to trial CPAP HS while in patient, possibly on discharge too  Will need sleep study post-discharge

## 2024-03-22 NOTE — ASSESSMENT & PLAN NOTE
Non compliant with CPAP at home  Agreed to trial CPAP HS while in patient, possibly on discharge too

## 2024-03-22 NOTE — PLAN OF CARE
Problem: PAIN - ADULT  Goal: Verbalizes/displays adequate comfort level or baseline comfort level  Description: Interventions:  - Encourage patient to monitor pain and request assistance  - Assess pain using appropriate pain scale  - Administer analgesics based on type and severity of pain and evaluate response  - Implement non-pharmacological measures as appropriate and evaluate response  - Consider cultural and social influences on pain and pain management  - Notify physician/advanced practitioner if interventions unsuccessful or patient reports new pain  Outcome: Progressing     Problem: INFECTION - ADULT  Goal: Absence or prevention of progression during hospitalization  Description: INTERVENTIONS:  - Assess and monitor for signs and symptoms of infection  - Monitor lab/diagnostic results  - Monitor all insertion sites, i.e. indwelling lines, tubes, and drains  - Monitor endotracheal if appropriate and nasal secretions for changes in amount and color  - [place] appropriate cooling/warming therapies per order  - Administer medications as ordered  - Instruct and encourage patient and family to use good hand hygiene technique  - Identify and instruct in appropriate isolation precautions for identified infection/condition  Outcome: Progressing     Problem: NEUROSENSORY - ADULT  Goal: Remains free of injury related to seizures activity  Description: INTERVENTIONS  - Maintain airway, patient safety  and administer oxygen as ordered  - Monitor patient for seizure activity, document and report duration and description of seizure to physician/advanced practitioner  - If seizure occurs,  ensure patient safety during seizure  - Reorient patient post seizure  - Seizure pads on all 4 side rails  - Instruct patient/family to notify RN of any seizure activity including if an aura is experienced  - Instruct patient/family to call for assistance with activity based on nursing assessment  - Administer anti-seizure medications  if ordered    Outcome: Progressing     Problem: GASTROINTESTINAL - ADULT  Goal: Maintains adequate nutritional intake  Description: INTERVENTIONS:  - Monitor percentage of each meal consumed  - Identify factors contributing to decreased intake, treat as appropriate  - Assist with meals as needed  - Monitor I&O, weight, and lab values if indicated  - Obtain nutrition services referral as needed  Outcome: Progressing     Problem: METABOLIC, FLUID AND ELECTROLYTES - ADULT  Goal: Electrolytes maintained within normal limits  Description: INTERVENTIONS:  - Monitor labs and assess patient for signs and symptoms of electrolyte imbalances  - Administer electrolyte replacement as ordered  - Monitor response to electrolyte replacements, including repeat lab results as appropriate  - Instruct patient on fluid and nutrition as appropriate  Outcome: Progressing  Goal: Fluid balance maintained  Description: INTERVENTIONS:  - Monitor labs   - Monitor I/O and WT  - Instruct patient on fluid and nutrition as appropriate  - Assess for signs & symptoms of volume excess or deficit  Outcome: Progressing
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
## 2024-03-22 NOTE — PLAN OF CARE
Problem: PAIN - ADULT  Goal: Verbalizes/displays adequate comfort level or baseline comfort level  Description: Interventions:  - Encourage patient to monitor pain and request assistance  - Assess pain using appropriate pain scale  - Administer analgesics based on type and severity of pain and evaluate response  - Implement non-pharmacological measures as appropriate and evaluate response  - Consider cultural and social influences on pain and pain management  - Notify physician/advanced practitioner if interventions unsuccessful or patient reports new pain  Outcome: Progressing     Problem: INFECTION - ADULT  Goal: Absence or prevention of progression during hospitalization  Description: INTERVENTIONS:  - Assess and monitor for signs and symptoms of infection  - Monitor lab/diagnostic results  - Monitor all insertion sites, i.e. indwelling lines, tubes, and drains  - Monitor endotracheal if appropriate and nasal secretions for changes in amount and color  - [place] appropriate cooling/warming therapies per order  - Administer medications as ordered  - Instruct and encourage patient and family to use good hand hygiene technique  - Identify and instruct in appropriate isolation precautions for identified infection/condition  Outcome: Progressing  Goal: Absence of fever/infection during neutropenic period  Description: INTERVENTIONS:  - Monitor WBC    Outcome: Progressing     Problem: SAFETY ADULT  Goal: Patient will remain free of falls  Description: INTERVENTIONS:  - Educate patient/family on patient safety including physical limitations  - Instruct patient to call for assistance with activity   - Consult OT/PT to assist with strengthening/mobility   - Keep Call bell within reach  - Keep bed low and locked with side rails adjusted as appropriate  - Keep care items and personal belongings within reach  - Initiate and maintain comfort rounds  - Make Fall Risk Sign visible to staff  - Offer Toileting every 2 Hours,  in advance of need  - Initiate/Maintain bed alarm  - Obtain necessary fall risk management equipment:   - Apply yellow socks and bracelet for high fall risk patients  - Consider moving patient to room near nurses station  Outcome: Progressing  Goal: Maintain or return to baseline ADL function  Description: INTERVENTIONS:  -  Assess patient's ability to carry out ADLs; assess patient's baseline for ADL function and identify physical deficits which impact ability to perform ADLs (bathing, care of mouth/teeth, toileting, grooming, dressing, etc.)  - Assess/evaluate cause of self-care deficits   - Assess range of motion  - Assess patient's mobility; develop plan if impaired  - Assess patient's need for assistive devices and provide as appropriate  - Encourage maximum independence but intervene and supervise when necessary  - Involve family in performance of ADLs  - Assess for home care needs following discharge   - Consider OT consult to assist with ADL evaluation and planning for discharge  - Provide patient education as appropriate  Outcome: Progressing  Goal: Maintains/Returns to pre admission functional level  Description: INTERVENTIONS:  - Perform AM-PAC 6 Click Basic Mobility/ Daily Activity assessment daily.  - Set and communicate daily mobility goal to care team and patient/family/caregiver.   - Collaborate with rehabilitation services on mobility goals if consulted  - Perform Range of Motion 2 times a day.  - Reposition patient every 2 hours.  - Dangle patient 2 times a day  - Stand patient 2 times a day  - Ambulate patient 2 times a day  - Out of bed to chair 2 times a day   - Out of bed for meals 2 times a day  - Out of bed for toileting  - Record patient progress and toleration of activity level   Outcome: Progressing

## 2024-03-22 NOTE — ASSESSMENT & PLAN NOTE
"MELD-NA 30 points on presentation  Reports non-compliance of all medications with exception of lactulose daily  Ammonia 44    - Daily MELD labs  - RUQ US: \"Echogenic liver suggestive of steatosis. ... Cholelithiasis. Gallbladder wall thickening. In view of negative Wyatt sign, this may be due to portal hypertension. ... Reversal of flow in the main portal vein, likely due to portal hypertension. Right pleural effusion.\"   - GI consulted; we appreciate their recommendations  Measure pleural fluid albumin levels to calculate SAAG.  Poor candidate for TIPS d/t Hx of hepatic encephalopathy.    Poor candidate for liver transplant given his poor compliance + social support.  \"Loop diuretics and albumin for now and transition him back to spironolactone and Lasix.  Once the fluid is controlled hopefully we can review pleurodesis option.\"  Low-salt diet   Continue with lactulose to the goal of 2-3 loose bowel movement and continue rifaximin.    "

## 2024-03-22 NOTE — ASSESSMENT & PLAN NOTE
"Elevated T bili of 15.33 on presentation  Likely secondary to cirrhosis  Noncompliant with PTA meds except Lactulose  Continue PTA Rifaximin & Lactulose  RUQ US: \"Echogenic liver suggestive of steatosis. ... Cholelithiasis. Gallbladder wall thickening. In view of negative Wyatt sign, this may be due to portal hypertension. ... Reversal of flow in the main portal vein, likely due to portal hypertension. Right pleural effusion.\"  Tbili downtrending  - Continue to trend   "

## 2024-03-22 NOTE — ASSESSMENT & PLAN NOTE
"MELD-NA 30 points on presentation  Reports non-compliance of all medications with exception of lactulose daily  Ammonia 44    - Daily MELD labs  - RUQ US: \"Echogenic liver suggestive of steatosis. ... Cholelithiasis. Gallbladder wall thickening. In view of negative Wyatt sign, this may be due to portal hypertension. ... Reversal of flow in the main portal vein, likely due to portal hypertension. Right pleural effusion.\"   - GI consulted; we appreciate their recommendations  Measure pleural fluid albumin levels to calculate SAAG.  Poor candidate for TIPS d/t Hx of hepatic encephalopathy.    Poor candidate for liver transplant given his poor compliance + social support.  \"Loop diuretics and albumin for now and transition him back to spironolactone and Lasix.  Once the fluid is controlled hopefully we can review pleurodesis option.\"  Low-salt diet   Continue with lactulose to the goal of 2-3 loose bowel movement and continue rifaximin.    Follow-up with GI outpatient  "

## 2024-03-22 NOTE — CASE MANAGEMENT
Case Management Discharge Planning Note    Patient name Curt Dominguez  Location /-01 MRN [id number]  : 1970 Date 3/22/2024       Current Admission Date: 3/20/2024  Current Admission Diagnosis:Pleural effusion, right   Patient Active Problem List    Diagnosis Date Noted    Macrocytic anemia 2024    Hypokalemia 2024    Hypertension 2024    Alcohol abuse 2024    Pleural effusion, right 2024    Alcoholic cirrhosis (HCC) 2024    Thrombocytopenia (HCC) 2024    Acute respiratory failure with hypoxia (HCC) 2024    SERGEI (obstructive sleep apnea) 2024    Elevated INR 2024    Serum total bilirubin elevated 2024    Ambulatory dysfunction 2023    Bipolar depression (HCC) 2014      LOS (days): 2  Geometric Mean LOS (GMLOS) (days):   Days to GMLOS:     OBJECTIVE:  Risk of Unplanned Readmission Score: 18.74         Current admission status: Inpatient   Preferred Pharmacy:   [facility] [address] [facility]  [address] [facility] [address]  Phone: [phone] Fax: [phone]    [facility]/pharmacy [address] [place] [address] - [address] [place] [address]  Phone: [phone] Fax: [phone]    Primary Care Provider: Leonela Oseguera MD    [facility]  Secondary Insurance:     DISCHARGE DETAILS:        CM received [facility] Warm Hand Off.  Cesar coming to meet with patient for assessment. Had more specific questions regarding use and patients current medical status.     1030 CM met with [facility]. Cesar indicated he met with patient who declined inpatient treatment indicating if he goes anywhere more than 28 days he will loose his SSD.  Cesar provided patient with [facility] Resource Guide for outpatient providers and  locations.  Patient indicated he desires to enroll  in IOP counseling through [facility] upon discharge from [facility].

## 2024-03-22 NOTE — ASSESSMENT & PLAN NOTE
Chronic anemia  Iron panel pending  B12 elevated  Folate low; continue supplementation  Transfuse if Hgb <7

## 2024-03-22 NOTE — PROGRESS NOTES
Critical Care Interval Transfer Note:    Please refer to progress note from earlier today for full details.     Barriers to discharge:   Will need librium to be weaned  Will need 2 more liters drained and possibly chest tube removal     Consults: IP CONSULT TO CASE MANAGEMENT  IP CONSULT TO GASTROENTEROLOGY  IP CONSULT TO PULMONOLOGY    Recommended to review admission imaging for incidental findings and document in discharge navigator: Chart reviewed, no known incidental findings noted at this time.      Discharge Plan: After 2 more liters drained, if otherwise stable          Patient seen and evaluated by Critical Care today and deemed to be appropriate for transfer to Stepdown Level 2. Spoke to Dr. Rausch from [facility] Hospitalist service to accept transfer. Critical care can be contacted via [employer] with any questions or concerns.

## 2024-03-22 NOTE — PLAN OF CARE
Problem: PAIN - ADULT  Goal: Verbalizes/displays adequate comfort level or baseline comfort level  Description: Interventions:  - Encourage patient to monitor pain and request assistance  - Assess pain using appropriate pain scale  - Administer analgesics based on type and severity of pain and evaluate response  - Implement non-pharmacological measures as appropriate and evaluate response  - Consider cultural and social influences on pain and pain management  - Notify physician/advanced practitioner if interventions unsuccessful or patient reports new pain  Outcome: Progressing     Problem: NEUROSENSORY - ADULT  Goal: Remains free of injury related to seizures activity  Description: INTERVENTIONS  - Maintain airway, patient safety  and administer oxygen as ordered  - Monitor patient for seizure activity, document and report duration and description of seizure to physician/advanced practitioner  - If seizure occurs,  ensure patient safety during seizure  - Reorient patient post seizure  - Seizure pads on all 4 side rails  - Instruct patient/family to notify RN of any seizure activity including if an aura is experienced  - Instruct patient/family to call for assistance with activity based on nursing assessment  - Administer anti-seizure medications if ordered    Outcome: Progressing     Problem: GASTROINTESTINAL - ADULT  Goal: Maintains adequate nutritional intake  Description: INTERVENTIONS:  - Monitor percentage of each meal consumed  - Identify factors contributing to decreased intake, treat as appropriate  - Assist with meals as needed  - Monitor I&O, weight, and lab values if indicated  - Obtain nutrition services referral as needed  Outcome: Progressing     Problem: METABOLIC, FLUID AND ELECTROLYTES - ADULT  Goal: Electrolytes maintained within normal limits  Description: INTERVENTIONS:  - Monitor labs and assess patient for signs and symptoms of electrolyte imbalances  - Administer electrolyte replacement as  ordered  - Monitor response to electrolyte replacements, including repeat lab results as appropriate  - Instruct patient on fluid and nutrition as appropriate  Outcome: Progressing  Goal: Fluid balance maintained  Description: INTERVENTIONS:  - Monitor labs   - Monitor I/O and WT  - Instruct patient on fluid and nutrition as appropriate  - Assess for signs & symptoms of volume excess or deficit  Outcome: Progressing
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
## 2024-03-22 NOTE — ASSESSMENT & PLAN NOTE
Secondary to cirrhosis  INR 2.84 on presentation; uptrending  PLT uptrending    - Trend INR  - Continue pharmacological DVT Ppx

## 2024-03-23 ENCOUNTER — APPOINTMENT (INPATIENT)
Dept: RADIOLOGY | Facility: HOSPITAL | Age: 54
End: 2024-03-23
Payer: COMMERCIAL

## 2024-03-23 LAB
ANION GAP SERPL CALCULATED.3IONS-SCNC: 3 MMOL/L (ref 4–13)
BACTERIA SPEC BFLD CULT: NO GROWTH
BILIRUB DIRECT SERPL-MCNC: 6.08 MG/DL (ref 0–0.2)
BUN SERPL-MCNC: 5 MG/DL (ref 5–25)
CALCIUM SERPL-MCNC: 7.3 MG/DL (ref 8.4–10.2)
CHLORIDE SERPL-SCNC: 102 MMOL/L (ref 96–108)
CO2 SERPL-SCNC: 25 MMOL/L (ref 21–32)
CREAT SERPL-MCNC: 0.73 MG/DL (ref 0.6–1.3)
ERYTHROCYTE [DISTWIDTH] IN BLOOD BY AUTOMATED COUNT: 13.2 % (ref 11.6–15.1)
GFR SERPL CREATININE-BSD FRML MDRD: 107 ML/MIN/1.73SQ M
GLUCOSE SERPL-MCNC: 111 MG/DL (ref 65–140)
GRAM STN SPEC: NORMAL
GRAM STN SPEC: NORMAL
HCT VFR BLD AUTO: 26.8 % (ref 36.5–49.3)
HGB BLD-MCNC: 8.9 G/DL (ref 12–17)
MAGNESIUM SERPL-MCNC: 1.6 MG/DL (ref 1.9–2.7)
MCH RBC QN AUTO: 34.1 PG (ref 26.8–34.3)
MCHC RBC AUTO-ENTMCNC: 33.2 G/DL (ref 31.4–37.4)
MCV RBC AUTO: 103 FL (ref 82–98)
PLATELET # BLD AUTO: 66 THOUSANDS/UL (ref 149–390)
PMV BLD AUTO: 9.7 FL (ref 8.9–12.7)
POTASSIUM SERPL-SCNC: 3.9 MMOL/L (ref 3.5–5.3)
RBC # BLD AUTO: 2.61 MILLION/UL (ref 3.88–5.62)
SODIUM SERPL-SCNC: 130 MMOL/L (ref 135–147)
WBC # BLD AUTO: 6.77 THOUSAND/UL (ref 4.31–10.16)

## 2024-03-23 PROCEDURE — 94660 CPAP INITIATION&MGMT: CPT

## 2024-03-23 PROCEDURE — 82248 BILIRUBIN DIRECT: CPT | Performed by: FAMILY MEDICINE

## 2024-03-23 PROCEDURE — 94760 N-INVAS EAR/PLS OXIMETRY 1: CPT

## 2024-03-23 PROCEDURE — 85027 COMPLETE CBC AUTOMATED: CPT | Performed by: FAMILY MEDICINE

## 2024-03-23 PROCEDURE — 99232 SBSQ HOSP IP/OBS MODERATE 35: CPT | Performed by: INTERNAL MEDICINE

## 2024-03-23 PROCEDURE — 83735 ASSAY OF MAGNESIUM: CPT | Performed by: FAMILY MEDICINE

## 2024-03-23 PROCEDURE — 80048 BASIC METABOLIC PNL TOTAL CA: CPT | Performed by: FAMILY MEDICINE

## 2024-03-23 PROCEDURE — 71046 X-RAY EXAM CHEST 2 VIEWS: CPT

## 2024-03-23 RX ORDER — LORAZEPAM 1 MG/1
2 TABLET ORAL ONCE
Status: COMPLETED | OUTPATIENT
Start: 2024-03-23 | End: 2024-03-23

## 2024-03-23 RX ORDER — MAGNESIUM SULFATE HEPTAHYDRATE 40 MG/ML
2 INJECTION, SOLUTION INTRAVENOUS ONCE
Status: COMPLETED | OUTPATIENT
Start: 2024-03-23 | End: 2024-03-23

## 2024-03-23 RX ADMIN — MAGNESIUM SULFATE HEPTAHYDRATE 2 G: 40 INJECTION, SOLUTION INTRAVENOUS at 04:52

## 2024-03-23 RX ADMIN — CHLORDIAZEPOXIDE HYDROCHLORIDE 25 MG: 25 CAPSULE ORAL at 05:01

## 2024-03-23 RX ADMIN — LACTULOSE 30 G: 10 SOLUTION ORAL at 09:42

## 2024-03-23 RX ADMIN — ALBUMIN (HUMAN) 25 G: 12.5 INJECTION, SOLUTION INTRAVENOUS at 10:00

## 2024-03-23 RX ADMIN — FOLIC ACID 1 MG: 1 TABLET ORAL at 09:42

## 2024-03-23 RX ADMIN — RIFAXIMIN 550 MG: 550 TABLET ORAL at 09:42

## 2024-03-23 RX ADMIN — SPIRONOLACTONE 50 MG: 25 TABLET, FILM COATED ORAL at 09:42

## 2024-03-23 RX ADMIN — CHLORDIAZEPOXIDE HYDROCHLORIDE 10 MG: 5 CAPSULE ORAL at 15:00

## 2024-03-23 RX ADMIN — LORAZEPAM 2 MG: 1 TABLET ORAL at 21:06

## 2024-03-23 RX ADMIN — HEPARIN SODIUM 5000 UNITS: 5000 INJECTION INTRAVENOUS; SUBCUTANEOUS at 05:01

## 2024-03-23 RX ADMIN — HEPARIN SODIUM 5000 UNITS: 5000 INJECTION INTRAVENOUS; SUBCUTANEOUS at 21:06

## 2024-03-23 RX ADMIN — Medication 1 TABLET: at 09:42

## 2024-03-23 RX ADMIN — Medication 400 MG: at 09:42

## 2024-03-23 RX ADMIN — PANTOPRAZOLE SODIUM 40 MG: 40 TABLET, DELAYED RELEASE ORAL at 05:01

## 2024-03-23 RX ADMIN — LAMOTRIGINE 25 MG: 25 TABLET ORAL at 09:43

## 2024-03-23 RX ADMIN — CARVEDILOL 6.25 MG: 6.25 TABLET, FILM COATED ORAL at 07:38

## 2024-03-23 RX ADMIN — LORAZEPAM 2 MG: 1 TABLET ORAL at 00:48

## 2024-03-23 RX ADMIN — FLUOXETINE 20 MG: 20 CAPSULE ORAL at 09:43

## 2024-03-23 RX ADMIN — THIAMINE HCL TAB 100 MG 100 MG: 100 TAB at 09:43

## 2024-03-23 RX ADMIN — LORAZEPAM 2 MG: 1 TABLET ORAL at 07:38

## 2024-03-23 RX ADMIN — CARVEDILOL 6.25 MG: 6.25 TABLET, FILM COATED ORAL at 17:24

## 2024-03-23 RX ADMIN — HEPARIN SODIUM 5000 UNITS: 5000 INJECTION INTRAVENOUS; SUBCUTANEOUS at 15:00

## 2024-03-23 RX ADMIN — FUROSEMIDE 60 MG: 20 TABLET ORAL at 09:42

## 2024-03-23 RX ADMIN — LORAZEPAM 2 MG: 1 TABLET ORAL at 15:16

## 2024-03-23 NOTE — ASSESSMENT & PLAN NOTE
Chronic alcohol use, appreciate GI input.  Continue diuretics as blood pressure allows.    Continue Librium taper

## 2024-03-23 NOTE — ASSESSMENT & PLAN NOTE
Continue diuretics as blood pressure allows, continue GI follow-up, continue lactulose plus rifaximin.

## 2024-03-23 NOTE — PROGRESS NOTES
[facility]  Progress Note  Name: Curt Dominguez I  MRN: [id number]  Unit/Bed#: -01 I Date of Admission: 3/20/2024   Date of Service: 3/23/2024 I Hospital Day: 3    Assessment/Plan   * Pleural effusion, right  Assessment & Plan  Appreciate critical care team input for pleural effusion management, 2 L drained today.    Alcohol abuse  Assessment & Plan  Chronic alcohol use, appreciate GI input.  Continue diuretics as blood pressure allows.    Continue Librium taper    Alcoholic cirrhosis (HCC)  Assessment & Plan  Continue diuretics as blood pressure allows, continue GI follow-up, continue lactulose plus rifaximin.    Ambulatory dysfunction  Assessment & Plan  Continue PT OT    Hypokalemia  Assessment & Plan  Monitor potassium level daily and replace as needed    Macrocytic anemia  Assessment & Plan  Hemoglobin trending down slightly, no evidence of bleeding.    Hypertension  Assessment & Plan  Continue Coreg, Lasix and Aldactone with hold parameters.             VTE Pharmacologic Prophylaxis:   Moderate Risk (Score 3-4) - Pharmacological DVT Prophylaxis Ordered: heparin.    Mobility:   Basic Mobility Inpatient Raw Score: 16  JH-HLM Goal: 5: Stand one or more mins  JH-HLM Achieved: 1: Laying in bed  JH-HLM Goal NOT achieved. Continue with multidisciplinary rounding and encourage appropriate mobility to improve upon JH-HLM goals.    Patient Centered Rounds: I performed bedside rounds with nursing staff today.   Discussions with Specialists or Other Care Team Provider: Case discussed with ICU team    Code Status: Level 1 - Full Code    Subjective:   No pain    Objective:     Vitals:   Temp (24hrs), Av.4 °F (36.9 °C), Min:97.4 °F (36.3 °C), Max:99.4 °F (37.4 °C)    Temp:  [97.4 °F (36.3 °C)-99.4 °F (37.4 °C)] 97.7 °F (36.5 °C)  HR:  [] 90  Resp:  [18-34] 18  BP: (101-136)/(56-79) 132/72  SpO2:  [95 %-97 %] 97 %  Body mass index is 34.02 kg/m².     Input and Output Summary  (last 24 hours):     Intake/Output Summary (Last 24 hours) at 3/23/2024 1409  Last data filed at 3/23/2024 0601  Gross per 24 hour   Intake 528.75 ml   Output 3000 ml   Net -2471.25 ml       Physical Exam:   Physical Exam  Vitals and nursing note reviewed.   HENT:      Head: Normocephalic and atraumatic.      Right Ear: External ear normal.      Left Ear: External ear normal.   Eyes:      General: Scleral icterus present.   Cardiovascular:      Rate and Rhythm: Normal rate.   Pulmonary:      Effort: Pulmonary effort is normal.   Musculoskeletal:      Cervical back: Normal range of motion.   Skin:     Coloration: Skin is jaundiced.   Neurological:      Mental Status: He is alert. Mental status is at baseline.          Additional Data:     Labs:  Results from last 7 days   Lab Units 03/23/24  0400 03/22/24  0347   WBC Thousand/uL 6.77 6.79   HEMOGLOBIN g/dL 8.9* 9.5*   HEMATOCRIT % 26.8* 28.7*   PLATELETS Thousands/uL 66* 65*   NEUTROS PCT %  --  55   LYMPHS PCT %  --  21   MONOS PCT %  --  17*   EOS PCT %  --  5     Results from last 7 days   Lab Units 03/23/24  0400 03/22/24  0347   SODIUM mmol/L 130* 135   POTASSIUM mmol/L 3.9 3.3*   CHLORIDE mmol/L 102 101   CO2 mmol/L 25 29   BUN mg/dL 5 5   CREATININE mg/dL 0.73 0.79   ANION GAP mmol/L 3* 5   CALCIUM mg/dL 7.3* 7.1*   ALBUMIN g/dL  --  1.8*   TOTAL BILIRUBIN mg/dL  --  11.45*   ALK PHOS U/L  --  96   ALT U/L  --  17   AST U/L  --  57*   GLUCOSE RANDOM mg/dL 111 135     Results from last 7 days   Lab Units 03/22/24  0347   INR  3.46*             Results from last 7 days   Lab Units 03/21/24  0540 03/21/24  0455 03/20/24  1930 03/20/24  1725 03/20/24  1453 03/20/24  1355 03/20/24  1313   LACTIC ACID mmol/L 2.0  --  3.2* 3.2* 3.5*   < >  --    PROCALCITONIN ng/ml  --  0.09  --   --   --   --  0.09    < > = values in this interval not displayed.       Lines/Drains:  Invasive Devices       Peripheral Intravenous Line  Duration             Peripheral IV 03/21/24  Dorsal (posterior);Left Forearm 2 days              Drain  Duration             Chest Tube 1 Right 8.5 Fr. 2 days    External Urinary Catheter Small 2 days                          Imaging: No pertinent imaging reviewed.    Recent Cultures (last 7 days):   Results from last 7 days   Lab Units 03/20/24  1559 03/20/24  1339   BLOOD CULTURE   --  No Growth at 48 hrs.  No Growth at 48 hrs.   GRAM STAIN RESULT  No polys seen  No organisms seen  --    BODY FLUID CULTURE, STERILE  No growth  --        Last 24 Hours Medication List:   Current Facility-Administered Medications   Medication Dose Route Frequency Provider Last Rate    carvedilol  6.25 mg Oral BID With Meals Ilan Rosario DO      chlordiazePOXIDE  10 mg Oral Q12H Ilan Rosario DO      [START ON 3/25/2024] chlordiazePOXIDE  5 mg Oral Once Ilan Rosario DO      FLUoxetine  20 mg Oral Daily Ilan Rosario DO      folic acid  1 mg Oral Daily Ilan Rosario DO      furosemide  60 mg Oral Daily Ilan Rosario DO      heparin (porcine)  5,000 Units Subcutaneous Q8H [facility] Ilan Rosario DO      lactulose  30 g Oral Daily Ilan Rosario DO      lamoTRIgine  25 mg Oral Daily Ilan Rosario DO      magnesium Oxide  400 mg Oral Daily Ilan Rosario DO      multivitamin-minerals  1 tablet Oral Daily Ilan Rosario DO      pantoprazole  40 mg Oral Early Morning Ilan Rosario DO      rifaximin  550 mg Oral Daily Ilan Rosario DO      spironolactone  50 mg Oral Daily Ilan Rosario DO      thiamine  100 mg Oral Daily Ilan Rosario DO          Today, Patient Was Seen By: Aubrey Gonzalez DO    **Please Note: This note may have been constructed using a voice recognition system.**

## 2024-03-23 NOTE — PLAN OF CARE
Problem: PAIN - ADULT  Goal: Verbalizes/displays adequate comfort level or baseline comfort level  Description: Interventions:  - Encourage patient to monitor pain and request assistance  - Assess pain using appropriate pain scale  - Administer analgesics based on type and severity of pain and evaluate response  - Implement non-pharmacological measures as appropriate and evaluate response  - Consider cultural and social influences on pain and pain management  - Notify physician/advanced practitioner if interventions unsuccessful or patient reports new pain  Outcome: Progressing     Problem: INFECTION - ADULT  Goal: Absence or prevention of progression during hospitalization  Description: INTERVENTIONS:  - Assess and monitor for signs and symptoms of infection  - Monitor lab/diagnostic results  - Monitor all insertion sites, i.e. indwelling lines, tubes, and drains  - Monitor endotracheal if appropriate and nasal secretions for changes in amount and color  - [place] appropriate cooling/warming therapies per order  - Administer medications as ordered  - Instruct and encourage patient and family to use good hand hygiene technique  - Identify and instruct in appropriate isolation precautions for identified infection/condition  Outcome: Progressing  Goal: Absence of fever/infection during neutropenic period  Description: INTERVENTIONS:  - Monitor WBC    Outcome: Progressing     Problem: SAFETY ADULT  Goal: Patient will remain free of falls  Description: INTERVENTIONS:  - Educate patient/family on patient safety including physical limitations  - Instruct patient to call for assistance with activity   - Consult OT/PT to assist with strengthening/mobility   - Keep Call bell within reach  - Keep bed low and locked with side rails adjusted as appropriate  - Keep care items and personal belongings within reach  - Initiate and maintain comfort rounds  - Make Fall Risk Sign visible to staff  - Offer Toileting every 2 Hours,  in advance of need  - Initiate/Maintain bed/chair alarm  - Obtain necessary fall risk management equipment  - Apply yellow socks and bracelet for high fall risk patients  - Consider moving patient to room near nurses station  Outcome: Progressing  Goal: Maintain or return to baseline ADL function  Description: INTERVENTIONS:  -  Assess patient's ability to carry out ADLs; assess patient's baseline for ADL function and identify physical deficits which impact ability to perform ADLs (bathing, care of mouth/teeth, toileting, grooming, dressing, etc.)  - Assess/evaluate cause of self-care deficits   - Assess range of motion  - Assess patient's mobility; develop plan if impaired  - Assess patient's need for assistive devices and provide as appropriate  - Encourage maximum independence but intervene and supervise when necessary  - Involve family in performance of ADLs  - Assess for home care needs following discharge   - Consider OT consult to assist with ADL evaluation and planning for discharge  - Provide patient education as appropriate  Outcome: Progressing  Goal: Maintains/Returns to pre admission functional level  Description: INTERVENTIONS:  - Perform AM-PAC 6 Click Basic Mobility/ Daily Activity assessment daily.  - Set and communicate daily mobility goal to care team and patient/family/caregiver.   - Collaborate with rehabilitation services on mobility goals if consulted  - Perform Range of Motion 3 times a day.  - Reposition patient every 2 hours.  - Dangle patient 3 times a day  - Stand patient 3 times a day  - Ambulate patient 3 times a day  - Out of bed to chair 3 times a day   - Out of bed for meals 3 times a day  - Out of bed for toileting  - Record patient progress and toleration of activity level   Outcome: Progressing     Problem: DISCHARGE PLANNING  Goal: Discharge to home or other facility with appropriate resources  Description: INTERVENTIONS:  - Identify barriers to discharge w/patient and  caregiver  - Arrange for needed discharge resources and transportation as appropriate  - Identify discharge learning needs (meds, wound care, etc.)  - Arrange for interpretive services to assist at discharge as needed  - Refer to Case Management Department for coordinating discharge planning if the patient needs post-hospital services based on physician/advanced practitioner order or complex needs related to functional status, cognitive ability, or social support system  Outcome: Progressing     Problem: Knowledge Deficit  Goal: Patient/family/caregiver demonstrates understanding of disease process, treatment plan, medications, and discharge instructions  Description: Complete learning assessment and assess knowledge base.  Interventions:  - Provide teaching at level of understanding  - Provide teaching via preferred learning methods  Outcome: Progressing     Problem: Prexisting or High Potential for Compromised Skin Integrity  Goal: Skin integrity is maintained or improved  Description: INTERVENTIONS:  - Identify patients at risk for skin breakdown  - Assess and monitor skin integrity  - Assess and monitor nutrition and hydration status  - Monitor labs   - Assess for incontinence   - Turn and reposition patient  - Assist with mobility/ambulation  - Relieve pressure over bony prominences  - Avoid friction and shearing  - Provide appropriate hygiene as needed including keeping skin clean and dry  - Evaluate need for skin moisturizer/barrier cream  - Collaborate with interdisciplinary team   - Patient/family teaching  - Consider wound care consult   Outcome: Progressing     Problem: NEUROSENSORY - ADULT  Goal: Remains free of injury related to seizures activity  Description: INTERVENTIONS  - Maintain airway, patient safety  and administer oxygen as ordered  - Monitor patient for seizure activity, document and report duration and description of seizure to physician/advanced practitioner  - If seizure occurs,  ensure  patient safety during seizure  - Reorient patient post seizure  - Seizure pads on all 4 side rails  - Instruct patient/family to notify RN of any seizure activity including if an aura is experienced  - Instruct patient/family to call for assistance with activity based on nursing assessment  - Administer anti-seizure medications if ordered    Outcome: Progressing  Goal: Achieves maximal functionality and self care  Description: INTERVENTIONS  - Monitor swallowing and airway patency with patient fatigue and changes in neurological status  - Encourage and assist patient to increase activity and self care.   - Encourage visually impaired, hearing impaired and aphasic patients to use assistive/communication devices  Outcome: Progressing     Problem: GASTROINTESTINAL - ADULT  Goal: Maintains adequate nutritional intake  Description: INTERVENTIONS:  - Monitor percentage of each meal consumed  - Identify factors contributing to decreased intake, treat as appropriate  - Assist with meals as needed  - Monitor I&O, weight, and lab values if indicated  - Obtain nutrition services referral as needed  Outcome: Progressing     Problem: METABOLIC, FLUID AND ELECTROLYTES - ADULT  Goal: Electrolytes maintained within normal limits  Description: INTERVENTIONS:  - Monitor labs and assess patient for signs and symptoms of electrolyte imbalances  - Administer electrolyte replacement as ordered  - Monitor response to electrolyte replacements, including repeat lab results as appropriate  - Instruct patient on fluid and nutrition as appropriate  Outcome: Progressing  Goal: Fluid balance maintained  Description: INTERVENTIONS:  - Monitor labs   - Monitor I/O and WT  - Instruct patient on fluid and nutrition as appropriate  - Assess for signs & symptoms of volume excess or deficit  Outcome: Progressing  Goal: Glucose maintained within target range  Description: INTERVENTIONS:  - Monitor Blood Glucose as ordered  - Assess for signs and  symptoms of hyperglycemia and hypoglycemia  - Administer ordered medications to maintain glucose within target range  - Assess nutritional intake and initiate nutrition service referral as needed  Outcome: Progressing

## 2024-03-24 ENCOUNTER — APPOINTMENT (INPATIENT)
Dept: RADIOLOGY | Facility: HOSPITAL | Age: 54
End: 2024-03-24
Payer: COMMERCIAL

## 2024-03-24 LAB
AMMONIA PLAS-SCNC: 57 UMOL/L (ref 18–72)
BASOPHILS # BLD AUTO: 0.1 THOUSANDS/ÂΜL (ref 0–0.1)
BASOPHILS NFR BLD AUTO: 2 % (ref 0–1)
EOSINOPHIL # BLD AUTO: 0.54 THOUSAND/ÂΜL (ref 0–0.61)
EOSINOPHIL NFR BLD AUTO: 8 % (ref 0–6)
ERYTHROCYTE [DISTWIDTH] IN BLOOD BY AUTOMATED COUNT: 13.1 % (ref 11.6–15.1)
HCT VFR BLD AUTO: 30.2 % (ref 36.5–49.3)
HGB BLD-MCNC: 10.1 G/DL (ref 12–17)
IMM GRANULOCYTES # BLD AUTO: 0.06 THOUSAND/UL (ref 0–0.2)
IMM GRANULOCYTES NFR BLD AUTO: 1 % (ref 0–2)
INR PPP: 3.59 (ref 0.84–1.19)
LYMPHOCYTES # BLD AUTO: 1.59 THOUSANDS/ÂΜL (ref 0.6–4.47)
LYMPHOCYTES NFR BLD AUTO: 25 % (ref 14–44)
MAGNESIUM SERPL-MCNC: 1.7 MG/DL (ref 1.9–2.7)
MCH RBC QN AUTO: 34.2 PG (ref 26.8–34.3)
MCHC RBC AUTO-ENTMCNC: 33.4 G/DL (ref 31.4–37.4)
MCV RBC AUTO: 102 FL (ref 82–98)
MONOCYTES # BLD AUTO: 1.05 THOUSAND/ÂΜL (ref 0.17–1.22)
MONOCYTES NFR BLD AUTO: 16 % (ref 4–12)
NEUTROPHILS # BLD AUTO: 3.09 THOUSANDS/ÂΜL (ref 1.85–7.62)
NEUTS SEG NFR BLD AUTO: 48 % (ref 43–75)
NRBC BLD AUTO-RTO: 0 /100 WBCS
PHOSPHATE SERPL-MCNC: 3 MG/DL (ref 2.7–4.5)
PLATELET # BLD AUTO: 81 THOUSANDS/UL (ref 149–390)
PMV BLD AUTO: 9.8 FL (ref 8.9–12.7)
PROCALCITONIN SERPL-MCNC: 0.1 NG/ML
PROTHROMBIN TIME: 36.1 SECONDS (ref 11.6–14.5)
RBC # BLD AUTO: 2.95 MILLION/UL (ref 3.88–5.62)
WBC # BLD AUTO: 6.43 THOUSAND/UL (ref 4.31–10.16)

## 2024-03-24 PROCEDURE — NC001 PR NO CHARGE: Performed by: STUDENT IN AN ORGANIZED HEALTH CARE EDUCATION/TRAINING PROGRAM

## 2024-03-24 PROCEDURE — 97530 THERAPEUTIC ACTIVITIES: CPT

## 2024-03-24 PROCEDURE — 84100 ASSAY OF PHOSPHORUS: CPT | Performed by: INTERNAL MEDICINE

## 2024-03-24 PROCEDURE — 71046 X-RAY EXAM CHEST 2 VIEWS: CPT

## 2024-03-24 PROCEDURE — 82140 ASSAY OF AMMONIA: CPT | Performed by: STUDENT IN AN ORGANIZED HEALTH CARE EDUCATION/TRAINING PROGRAM

## 2024-03-24 PROCEDURE — 83735 ASSAY OF MAGNESIUM: CPT | Performed by: INTERNAL MEDICINE

## 2024-03-24 PROCEDURE — 85610 PROTHROMBIN TIME: CPT | Performed by: INTERNAL MEDICINE

## 2024-03-24 PROCEDURE — 84145 PROCALCITONIN (PCT): CPT | Performed by: INTERNAL MEDICINE

## 2024-03-24 PROCEDURE — 85025 COMPLETE CBC W/AUTO DIFF WBC: CPT | Performed by: INTERNAL MEDICINE

## 2024-03-24 PROCEDURE — 99223 1ST HOSP IP/OBS HIGH 75: CPT | Performed by: STUDENT IN AN ORGANIZED HEALTH CARE EDUCATION/TRAINING PROGRAM

## 2024-03-24 PROCEDURE — 71045 X-RAY EXAM CHEST 1 VIEW: CPT

## 2024-03-24 PROCEDURE — 97116 GAIT TRAINING THERAPY: CPT

## 2024-03-24 PROCEDURE — 99232 SBSQ HOSP IP/OBS MODERATE 35: CPT | Performed by: INTERNAL MEDICINE

## 2024-03-24 RX ORDER — LORAZEPAM 1 MG/1
2 TABLET ORAL ONCE
Status: COMPLETED | OUTPATIENT
Start: 2024-03-24 | End: 2024-03-24

## 2024-03-24 RX ORDER — PHYTONADIONE 5 MG/1
5 TABLET ORAL DAILY
Status: DISCONTINUED | OUTPATIENT
Start: 2024-03-24 | End: 2024-03-28

## 2024-03-24 RX ORDER — SPIRONOLACTONE 25 MG/1
50 TABLET ORAL DAILY
Status: DISCONTINUED | OUTPATIENT
Start: 2024-03-25 | End: 2024-03-26

## 2024-03-24 RX ORDER — MAGNESIUM SULFATE HEPTAHYDRATE 40 MG/ML
2 INJECTION, SOLUTION INTRAVENOUS ONCE
Status: COMPLETED | OUTPATIENT
Start: 2024-03-24 | End: 2024-03-24

## 2024-03-24 RX ORDER — ALBUMIN, HUMAN INJ 5% 5 %
25 SOLUTION INTRAVENOUS ONCE
Status: COMPLETED | OUTPATIENT
Start: 2024-03-24 | End: 2024-03-24

## 2024-03-24 RX ADMIN — Medication 400 MG: at 08:02

## 2024-03-24 RX ADMIN — MAGNESIUM SULFATE HEPTAHYDRATE 2 G: 40 INJECTION, SOLUTION INTRAVENOUS at 07:52

## 2024-03-24 RX ADMIN — CHLORDIAZEPOXIDE HYDROCHLORIDE 5 MG: 5 CAPSULE ORAL at 23:17

## 2024-03-24 RX ADMIN — HEPARIN SODIUM 5000 UNITS: 5000 INJECTION INTRAVENOUS; SUBCUTANEOUS at 06:12

## 2024-03-24 RX ADMIN — LORAZEPAM 2 MG: 1 TABLET ORAL at 06:12

## 2024-03-24 RX ADMIN — PHYTONADIONE 5 MG: 5 TABLET ORAL at 09:43

## 2024-03-24 RX ADMIN — CHLORDIAZEPOXIDE HYDROCHLORIDE 10 MG: 5 CAPSULE ORAL at 02:43

## 2024-03-24 RX ADMIN — ALBUMIN (HUMAN) 25 G: 12.5 INJECTION, SOLUTION INTRAVENOUS at 11:01

## 2024-03-24 RX ADMIN — LACTULOSE 30 G: 10 SOLUTION ORAL at 08:02

## 2024-03-24 RX ADMIN — HEPARIN SODIUM 5000 UNITS: 5000 INJECTION INTRAVENOUS; SUBCUTANEOUS at 14:59

## 2024-03-24 RX ADMIN — Medication 1 TABLET: at 08:02

## 2024-03-24 RX ADMIN — HEPARIN SODIUM 5000 UNITS: 5000 INJECTION INTRAVENOUS; SUBCUTANEOUS at 23:00

## 2024-03-24 RX ADMIN — FLUOXETINE 20 MG: 20 CAPSULE ORAL at 08:02

## 2024-03-24 RX ADMIN — RIFAXIMIN 550 MG: 550 TABLET ORAL at 08:02

## 2024-03-24 RX ADMIN — PANTOPRAZOLE SODIUM 40 MG: 40 TABLET, DELAYED RELEASE ORAL at 06:12

## 2024-03-24 RX ADMIN — FUROSEMIDE 60 MG: 20 TABLET ORAL at 08:02

## 2024-03-24 RX ADMIN — CARVEDILOL 6.25 MG: 6.25 TABLET, FILM COATED ORAL at 07:53

## 2024-03-24 RX ADMIN — FUROSEMIDE 60 MG: 20 TABLET ORAL at 15:01

## 2024-03-24 RX ADMIN — LAMOTRIGINE 25 MG: 25 TABLET ORAL at 08:02

## 2024-03-24 RX ADMIN — FOLIC ACID 1 MG: 1 TABLET ORAL at 08:02

## 2024-03-24 RX ADMIN — SPIRONOLACTONE 50 MG: 25 TABLET, FILM COATED ORAL at 08:02

## 2024-03-24 RX ADMIN — THIAMINE HCL TAB 100 MG 100 MG: 100 TAB at 08:02

## 2024-03-24 NOTE — PLAN OF CARE
"  Problem: OCCUPATIONAL THERAPY ADULT  Goal: Performs self-care activities at highest level of function for planned discharge setting.  See evaluation for individualized goals.  Description: Treatment Interventions: ADL retraining, Functional transfer training, UE strengthening/ROM, Endurance training, Patient/family training, Equipment evaluation/education, Compensatory technique education, Continued evaluation, Energy conservation, Activityengagement          See flowsheet documentation for full assessment, interventions and recommendations.   Note: Limitation: Decreased ADL status, Decreased UE strength, Decreased Safe judgement during ADL, Decreased endurance, Decreased self-care trans, Decreased high-level ADLs  Prognosis: Fair  Assessment: Pt completed OT tx session #2 on this date. Focused on ADL performance and activity tolerance. Pt tolerated today's session fair and was limited by fatigue and B LE edema. Pt noted with 3+ edema in B LE mid thigh-distally through B feet. Pt noted to only be capable of taking small shuffled steps with mod A x2 and use of RW. Pt required total A to complete LB dressing today as he was unable to reach down to attempt for self. Pt with condom cath on as well as brief during today's session. Pt required max A x2 to safely complete SPT to sit down on recliner chair d/t significant posterior lean and noting that his legs felt weak. Pt also noted to be \"seeing spots\" with ambulation during today's session -- BP was WNL. Pt educated on importance of getting up OOB and sitting in chair for periods of time throughout the day to increase overall endurance and activity tolerance to which he was agreeable to. At end of session pt was safely seated in recliner chair with B LE elevated and propped on pillow, alarm activated with tan cord plugged in, and all needs within reach.     Rehab Resource Intensity Level, OT: II (Moderate Resource Intensity)          "

## 2024-03-24 NOTE — PROCEDURES
Procedures  Chest tube removal    Patient was layed down and the occlusive dressing removed. The pigtail sutures were cut and the chest tube removed while the patient exerted positive intrathoracic pressure. An occlusive dressing was applied.    A follow-up CXR has already been ordered and an additional CXR will be performed on the morning of 3/25/2024.    Matt Cat MD PhD  Anesthesiology and Critical Care, [facility]  Phone: [phone]  Fax: [phone]  Email: [email or web address]  March 24, 2024

## 2024-03-24 NOTE — ASSESSMENT & PLAN NOTE
Decompensated alcohol induced liver cirrhosis.    Suspected hepatic encephalopathy, continue lactulose, repeat ammonia level requested.    Increased diuretics today.    Lasix 60 mg p.o. twice daily, hold for systolic blood pressure less than 90.    Continue Aldactone 50 mg daily, consider increased dose, monitor electrolytes, hold Aldactone for systolic blood pressure less than 100.    Will follow-up with additional GI recommendations, consult note reviewed.    MELD-Na equal to 29, with estimated 90-day mortality of 32%.    Mondry score of 97

## 2024-03-24 NOTE — PHYSICAL THERAPY NOTE
PHYSICAL THERAPY TREATMENT NOTE  NAME:  Curt Dominguez  DATE: 03/24/24    Length Of Stay: 4  Performed at least 2 patient identifiers during session: Name and Birthday    TREATMENT FLOW SHEET:    03/24/24 0954   PT Last Visit   PT Visit Date 03/24/24   Note Type   Note Type Treatment   Pain Assessment   Pain Assessment Tool 0-10   Restrictions/Precautions   Weight Bearing Precautions Per Order No   Other Precautions Chair Alarm;Bed Alarm;Multiple lines;Telemetry;Fall Risk   General   Chart Reviewed Yes   Additional Pertinent History BL LE edema noted mid-thigh distal to feet @ 3+   Response to Previous Treatment Patient with no complaints from previous session.   Family/Caregiver Present No   Cognition   Arousal/Participation Lethargic;Poorly responsive  (Responsiveness improved t/o session/ with mobility tasks)   Attention Attends with cues to redirect   Orientation Level Oriented to person;Oriented to place;Oriented to time   Following Commands Follows one step commands with increased time or repetition   Bed Mobility   Rolling R 3  Moderate assistance   Rolling L 3  Moderate assistance   Supine to Sit 3  Moderate assistance   Additional items Increased time required;Verbal cues;LE management;Assist x 1   Additional Comments Pt supine in bed at start of session with all needs within reach.  Pt completed rolling R <> L mod A with use of bed enablers and increased time.  Pt completed supine > sit with mod A x 1 with HOB flat, increased time and VC's for sequencing and LE/trunk maangement   Transfers   Sit to Stand 3  Moderate assistance   Additional items Assist x 2;Increased time required;Verbal cues   Stand to Sit 3  Moderate assistance   Additional items Assist x 2;Impulsive;Verbal cues  (Decreased safety awareness and poor controlled descent to seated D/T same)   Stand pivot 3  Moderate assistance   Additional items Assist x 2;Increased time required;Verbal cues   Additional Comments Patient completed all  functional transfers with Rw for UB support.  STS EOB> RW mod A x 2 with poosterior lean and poor carryover of cues to correct.  Pt completed SPT with mod/max A x 2 constant verbal and tactile cues.  Pt required assist to maintain COG over ELIS, manage AD and completed limb advance.  Pt noted to have decreased alertness, diffiuclty following commands without repetition/cues, 3+ BL LE edema and tremors t/o.   Ambulation/Elevation   Gait pattern Improper Weight shift;Decreased foot clearance;Festenating;Shuffling;Inconsistent nasra;Redundant gait at times;Short stride;Step to;Excessively slow   Gait Assistance 3  Moderate assist   Additional items Assist x 2;Verbal cues;Tactile cues   Assistive Device Rolling walker   Distance Pt ambulated approximately 5' with Rw mod A x 2 with short, shuffling steps.  Pt required verbal and tactile cues for posture, Ad mangement, increased foot clearance and stride t/o.  Limited carryover of cues.  Pt with lethargy, decreased safety/overall awarenss, increased LE edema and tremors noted.   Balance   Static Sitting Fair +   Dynamic Sitting Fair   Static Standing Poor +   Dynamic Standing Poor   Ambulatory Poor   Endurance Deficit   Endurance Deficit Yes   Endurance Deficit Description Pt noted to have increased lethargy and limited tolerance for activty during session.  Vitals WFL's during performed activity   Activity Tolerance   Activity Tolerance Patient limited by fatigue   Medical Staff Made Aware OT, Ali   Nurse Made Aware RN, Cece   Exercises   Ankle Pumps Sitting;Bilateral;AROM;20 reps   Assessment   Prognosis Fair   Problem List Decreased strength;Decreased endurance;Impaired balance;Decreased mobility;Impaired judgement;Decreased safety awareness;Obesity   Barriers to Discharge Decreased caregiver support;Inaccessible home environment   Barriers to Discharge Comments Pt lives alone and requires significant A for all mobility/ADL's, fall risk, LAMONTE   Goals   PT Treatment Day  2   Plan   Treatment/Interventions ADL retraining;Functional transfer training;LE strengthening/ROM;Elevations;Therapeutic exercise;Endurance training;Equipment eval/education;Bed mobility;Gait training;Compensatory technique education;Spoke to nursing;OT   Progress Slow progress, medical status limitations   PT Frequency 3-5x/wk   Discharge Recommendation   Rehab Resource Intensity Level, PT II (Moderate Resource Intensity)   Equipment Recommended   (Pt currently utilizing a RW during mobility)   AM-PAC Basic Mobility Inpatient   Turning in Flat Bed Without Bedrails 3   Lying on Back to Sitting on Edge of Flat Bed Without Bedrails 2   Moving Bed to Chair 2   Standing Up From Chair Using Arms 2   Walk in Room 2   Climb 3-5 Stairs With Railing 2   Basic Mobility Inpatient Raw Score 13   Basic Mobility Standardized Score 33.99   [facility] Highest Level Of Mobility   -[facility] Goal 4: Move to chair/commode   -[facility] Achieved 4: Move to chair/commode   Education   Education Provided Mobility training;Home exercise program  (x 20 ankle pumps)   Patient Reinforcement needed   End of Consult   Patient Position at End of Consult Seated edge of bed;Bed/Chair alarm activated;All needs within reach       Patient seen for skilled PT session 2 this date with interventions to include ther act for bed mobility, functional transfers, balance, endurance and gait training with RW.  Pt noted to have increased lethargy, decreased response time although improved t/o session. Pt was noted to have decline in mobility level since last session with pt initially performing at A x 1 and today requiring A x 2 as well as ambulating decreased distance from 18' last session to 5' this date.  B/L edema mid-thigh distal to feet at 3+ as well as tremors noted.  Patient should cont skilled PT during inpatient stay as he is functioning below baseline F/B transition to level II resources. The patient's AM-PAC Basic Mobility Inpatient Short Form Raw  Score is 13. A Raw score of less than or equal to 16 suggests the patient may benefit from discharge to post-acute rehabilitation services. Please also refer to the recommendation of the Physical Therapist for safe discharge planning.         Inga Castro, PT, MSPT

## 2024-03-24 NOTE — ASSESSMENT & PLAN NOTE
Monitor potassium level daily and replace as needed    Patient may benefit from increased dose of Aldactone.

## 2024-03-24 NOTE — ASSESSMENT & PLAN NOTE
Appreciate critical care team input for pleural effusion management, additional fluid removed today.

## 2024-03-24 NOTE — OCCUPATIONAL THERAPY NOTE
Occupational Therapy Progress Note     Patient Name: Curt Dominguez  Today's Date: 3/24/2024  Problem List  Principal Problem:    Pleural effusion, right  Active Problems:    Alcohol abuse    Alcoholic cirrhosis (HCC)    Thrombocytopenia (HCC)    Ambulatory dysfunction    Bipolar depression (HCC)    SERGEI (obstructive sleep apnea)    Elevated INR    Macrocytic anemia    Hypokalemia    Hypertension    GERD (gastroesophageal reflux disease)        03/24/24 0955   OT Last Visit   OT Visit Date 03/24/24   Note Type   Note Type Treatment   Pain Assessment   Pain Assessment Tool 0-10   Pain Score No Pain   Restrictions/Precautions   Other Precautions Chair Alarm;Bed Alarm;Multiple lines;Fall Risk;Telemetry   ADL   Where Assessed Supine, bed   Eating Deficit Setup   Eating Comments A to take sips of water throughout session   LB Dressing Assistance 1  Total Assistance   LB Dressing Comments don B socks while supine in bed, pt unable to attempt to participate d/t sigificant weakness and fatigue   Functional Standing Tolerance   Time ~2 minutes   Activity standing at RW   Comments posterior lean throughout-- difficult to correct as pt had a hard time following verbal cues   Bed Mobility   Rolling R 3  Moderate assistance   Rolling L 3  Moderate assistance   Supine to Sit 3  Moderate assistance   Additional items Increased time required;LE management;Assist x 1   Additional Comments Pt received supine in bed, required mod A for rolling R<> L and also required mod A to complete supine > sit transfer   Transfers   Sit to Stand 3  Moderate assistance   Additional items Assist x 2;Increased time required;Verbal cues  (posterior lean)   Stand to Sit 3  Moderate assistance   Additional items Assist x 2;Increased time required;Verbal cues   Stand pivot 2  Maximal assistance  (x2-- shuffled small steps c posterior lean)   Additional items Verbal cues;Increased time required;Assist x 2  (c RW)   Additional Comments pt with  "significant weakness and poor command following noted with functional transfers   Functional Mobility   Functional Mobility 3  Moderate assistance   Additional Comments Pt ambulated a few feet from bed > recliner chair with mod A x2 and use of RW. Pt with posterior lean throughout ambulation-- unable to follow verbal cueing to correct   Additional items Rolling walker   Subjective   Subjective \"I am seeing spots\"   Cognition   Arousal/Participation Arousable;Cooperative  (pt poorly responsive at start of session but more alert after sitting EOB and cooperative throughout session to his ability.)   Attention Within functional limits   Orientation Level Oriented to person;Oriented to place;Oriented to time   Following Commands Follows one step commands with increased time or repetition   Activity Tolerance   Activity Tolerance Patient limited by fatigue   Medical Staff Made Aware RN, Cece; PT Miranda   Assessment   Assessment Pt completed OT tx session #2 on this date. Focused on ADL performance and activity tolerance. Pt tolerated today's session fair and was limited by fatigue and B LE edema. Pt noted with 3+ edema in B LE mid thigh-distally through B feet. Pt noted to only be capable of taking small shuffled steps with mod A x2 and use of RW. Pt required total A to complete LB dressing today as he was unable to reach down to attempt for self. Pt with condom cath on as well as brief during today's session. Pt required max A x2 to safely complete SPT to sit down on recliner chair d/t significant posterior lean and noting that his legs felt weak. Pt also noted to be \"seeing spots\" with ambulation during today's session -- BP was WNL. Pt educated on importance of getting up OOB and sitting in chair for periods of time throughout the day to increase overall endurance and activity tolerance to which he was agreeable to. At end of session pt was safely seated in recliner chair with B LE elevated and propped on pillow, alarm " activated with tan cord plugged in, and all needs within reach.   Plan   Treatment Interventions ADL retraining;Functional transfer training;Endurance training;Energy conservation;Activityengagement   Goal Expiration Date 04/04/24   OT Treatment Day 2   OT Frequency 3-5x/wk   Discharge Recommendation   Rehab Resource Intensity Level, OT II (Moderate Resource Intensity)   AM-PAC Daily Activity Inpatient   Lower Body Dressing 1   Bathing 1   Toileting 1   Upper Body Dressing 2   Grooming 3   Eating 3   Daily Activity Raw Score 11   Daily Activity Standardized Score (Calc for Raw Score >=11) 29.04   AM-PAC Applied Cognition Inpatient   Following a Speech/Presentation 3   Understanding Ordinary Conversation 4   Taking Medications 2   Remembering Where Things Are Placed or Put Away 3   Remembering List of 4-5 Errands 3   Taking Care of Complicated Tasks 3   Applied Cognition Raw Score 18   Applied Cognition Standardized Score 38.07       The patient's raw score on the AM-PAC Daily Activity inpatient short form is 11, standardized score is 29.04, less than 39.4. Patients at this level are likely to benefit from DC to post-acute rehabilitation services. Please refer to the recommendation of the Occupational Therapist for safe DC planning.        Pt with decline in level of function noted since last OT session; was able to participate with min A and today was dependent and required Ax2. See flow sheet above for additional session details-- pt left seated in recliner chair at end of session with alarm activated and all needs in reach; B LE elevated on leg rest and pillow to assist with B LE edema.       Pt goals to be met by 4/4/2024:     Pt will demonstrate ability to complete grooming/hygiene tasks @ mod I after set-up.  Pt will demonstrate ability to complete supine<>sit @ mod I in order to increase safety and independence during ADL tasks.  Pt will demonstrate ability to complete UB ADLs including washing/dressing @ mod  I in order to increase performance and participation during meaningful tasks  Pt will demonstrate ability to complete LB dressing @ mod I in order to increase safety and independence during meaningful tasks.   Pt will demonstrate ability to nick/doff socks/shoes while sitting EOB/chair @ mod I in order to increase safety and independence during meaningful tasks.   Pt will demonstrate ability to complete toileting tasks including CM and pericare @ mod I in order to increase safety and independence during meaningful tasks.  Pt will demonstrate ability to complete EOB, chair, toilet/commode transfers @ mod I in order to increase performance and participation during functional tasks.  Pt will demonstrate ability to stand for 10 minutes while maintaining F+ balance with use of RW for UB support PRN.  Pt will demonstrate ability to tolerate 20 minute OT session with no vc'ing for deep breathing or use of energy conservation techniques in order to increase activity tolerance during functional tasks.   Pt will demonstrate Good carryover of use of energy conservation/compensatory strategies during ADLs and functional tasks in order to increase safety and reduce risk for falls.   Pt will demonstrate Good attention and participation in continued evaluation of functional ambulation house hold distances in order to assist with safe d/c planning.  Pt will attend to continued cognitive assessments 100% of the time in order to provide most appropriate d/c recommendations.   Pt will follow 100% simple 2-step commands and be A&O x4 consistently with environmental cues to increase participation in functional activities.   Pt will identify 3 areas of interest/hobbies and 1 intervention on how to incorporate into daily life in order to increase interaction with environment and peers as well as increase participation in meaningful tasks.   Pt will demonstrate 100% carryover of BUE HEP in order to increase BUE MS and increase performance  during functional tasks upon d/c home.      Saba Nagy MS  OTR/L

## 2024-03-24 NOTE — PLAN OF CARE
Problem: PAIN - ADULT  Goal: Verbalizes/displays adequate comfort level or baseline comfort level  Description: Interventions:  - Encourage patient to monitor pain and request assistance  - Assess pain using appropriate pain scale  - Administer analgesics based on type and severity of pain and evaluate response  - Implement non-pharmacological measures as appropriate and evaluate response  - Consider cultural and social influences on pain and pain management  - Notify physician/advanced practitioner if interventions unsuccessful or patient reports new pain  3/24/2024 1121 by Meryl Cantu RN  Outcome: Progressing  3/24/2024 1118 by Meryl Cantu RN  Outcome: Progressing     Problem: INFECTION - ADULT  Goal: Absence or prevention of progression during hospitalization  Description: INTERVENTIONS:  - Assess and monitor for signs and symptoms of infection  - Monitor lab/diagnostic results  - Monitor all insertion sites, i.e. indwelling lines, tubes, and drains  - Monitor endotracheal if appropriate and nasal secretions for changes in amount and color  - [place] appropriate cooling/warming therapies per order  - Administer medications as ordered  - Instruct and encourage patient and family to use good hand hygiene technique  - Identify and instruct in appropriate isolation precautions for identified infection/condition  3/24/2024 1121 by Meryl Cantu RN  Outcome: Progressing  3/24/2024 1118 by Meryl Cantu RN  Outcome: Progressing  Goal: Absence of fever/infection during neutropenic period  Description: INTERVENTIONS:  - Monitor WBC    3/24/2024 1121 by Meryl Cantu RN  Outcome: Progressing  3/24/2024 1118 by Meryl Cantu RN  Outcome: Progressing     Problem: SAFETY ADULT  Goal: Patient will remain free of falls  Description: INTERVENTIONS:  - Educate patient/family on patient safety including physical limitations  - Instruct patient to call for assistance with activity   - Consult OT/PT to  assist with strengthening/mobility   - Keep Call bell within reach  - Keep bed low and locked with side rails adjusted as appropriate  - Keep care items and personal belongings within reach  - Initiate and maintain comfort rounds  - Make Fall Risk Sign visible to staff  - Offer Toileting every 2 Hours, in advance of need  - Initiate/Maintain bed alarm  - Obtain necessary fall risk management equipment: call bell in reach.alarm activated  - Apply yellow socks and bracelet for high fall risk patients  - Consider moving patient to room near nurses station  3/24/2024 1121 by Meryl Cantu RN  Outcome: Progressing  3/24/2024 1118 by Meryl Cantu RN  Outcome: Progressing  Goal: Maintain or return to baseline ADL function  Description: INTERVENTIONS:  -  Assess patient's ability to carry out ADLs; assess patient's baseline for ADL function and identify physical deficits which impact ability to perform ADLs (bathing, care of mouth/teeth, toileting, grooming, dressing, etc.)  - Assess/evaluate cause of self-care deficits   - Assess range of motion  - Assess patient's mobility; develop plan if impaired  - Assess patient's need for assistive devices and provide as appropriate  - Encourage maximum independence but intervene and supervise when necessary  - Involve family in performance of ADLs  - Assess for home care needs following discharge   - Consider OT consult to assist with ADL evaluation and planning for discharge  - Provide patient education as appropriate  3/24/2024 1121 by Meryl Cantu RN  Outcome: Not Progressing  3/24/2024 1118 by Meryl Cantu RN  Outcome: Not Progressing  Goal: Maintains/Returns to pre admission functional level  Description: INTERVENTIONS:  - Perform AM-PAC 6 Click Basic Mobility/ Daily Activity assessment daily.  - Set and communicate daily mobility goal to care team and patient/family/caregiver.   - Collaborate with rehabilitation services on mobility goals if consulted  - Perform  Range of Motion 2 times a day.  - Reposition patient every 2 hours.  - Dangle patient 3 times a day  - Stand patient 3 times a day  - Ambulate patient 3 times a day  - Out of bed to chair 3 times a day   - Out of bed for meals 3 times a day  - Out of bed for toileting  - Record patient progress and toleration of activity level   3/24/2024 1121 by Meryl Cantu RN  Outcome: Not Progressing  3/24/2024 1118 by Meryl Cantu RN  Outcome: Not Progressing     Problem: DISCHARGE PLANNING  Goal: Discharge to home or other facility with appropriate resources  Description: INTERVENTIONS:  - Identify barriers to discharge w/patient and caregiver  - Arrange for needed discharge resources and transportation as appropriate  - Identify discharge learning needs (meds, wound care, etc.)  - Arrange for interpretive services to assist at discharge as needed  - Refer to Case Management Department for coordinating discharge planning if the patient needs post-hospital services based on physician/advanced practitioner order or complex needs related to functional status, cognitive ability, or social support system  3/24/2024 1121 by Meryl Cantu RN  Outcome: Progressing  3/24/2024 1118 by Meryl Cantu RN  Outcome: Progressing
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
## 2024-03-24 NOTE — ASSESSMENT & PLAN NOTE
Chronic alcohol use, appreciate GI input.  Continue diuretics as blood pressure allows.    Continue Librium taper.    Alcohol cessation recommended, case management following.

## 2024-03-24 NOTE — PROGRESS NOTES
[facility]  Progress Note  Name: Curt Dominguez I  MRN: [id number]  Unit/Bed#: -01 I Date of Admission: 3/20/2024   Date of Service: 3/24/2024 I Hospital Day: 4    Assessment/Plan   * Pleural effusion, right  Assessment & Plan  Appreciate critical care team input for pleural effusion management, additional fluid removed today.    Alcohol abuse  Assessment & Plan  Chronic alcohol use, appreciate GI input.  Continue diuretics as blood pressure allows.    Continue Librium taper.    Alcohol cessation recommended, case management following.    Alcoholic cirrhosis (HCC)  Assessment & Plan  Decompensated alcohol induced liver cirrhosis.    Suspected hepatic encephalopathy, continue lactulose, repeat ammonia level requested.    Increased diuretics today.    Lasix 60 mg p.o. twice daily, hold for systolic blood pressure less than 90.    Continue Aldactone 50 mg daily, consider increased dose, monitor electrolytes, hold Aldactone for systolic blood pressure less than 100.    Will follow-up with additional GI recommendations, consult note reviewed.    MELD-Na equal to 29, with estimated 90-day mortality of 32%.    Mondry score of 97    Ambulatory dysfunction  Assessment & Plan  Continue PT OT, patient may need short-term rehab.    Hypokalemia  Assessment & Plan  Monitor potassium level daily and replace as needed    Patient may benefit from increased dose of Aldactone.    Macrocytic anemia  Assessment & Plan  Hemoglobin trended up today, no evidence of bleeding.    GERD (gastroesophageal reflux disease)  Assessment & Plan  Continue PPI    Bipolar depression (HCC)  Assessment & Plan  Continue Prozac    Hypertension  Assessment & Plan  Continue Coreg, Lasix and Aldactone with hold parameters.    SERGEI (obstructive sleep apnea)  Assessment & Plan  Patient noncompliant with CPAP    Thrombocytopenia (HCC)  Assessment & Plan  Platelet count trending up    Elevated INR  Assessment & Plan  P.o.  vitamin K started today             VTE Pharmacologic Prophylaxis:   Moderate Risk (Score 3-4) - Pharmacological DVT Prophylaxis Ordered: heparin.    Mobility:   Basic Mobility Inpatient Raw Score: 16  JH-HLM Goal: 5: Stand one or more mins  JH-HLM Achieved: 3: Sit at edge of bed  JH-HLM Goal NOT achieved. Continue with multidisciplinary rounding and encourage appropriate mobility to improve upon JH-HLM goals.      Discussions with Specialists or Other Care Team Provider: Case discussed with anesthesia critical care        Current Length of Stay: 4 day(s)  Current Patient Status: Inpatient   Certification Statement: The patient will continue to require additional inpatient hospital stay due to still with Pleurx catheter, still requiring diuretics.  Discharge Plan: Anticipate discharge in 24-48 hrs to patient is severely debilitated, follow-up with PT OT evaluation, patient will likely benefit from short-term rehab.    Code Status: Level 1 - Full Code    Subjective:       Objective:     Vitals:   Temp (24hrs), Av.8 °F (36.6 °C), Min:97.5 °F (36.4 °C), Max:98 °F (36.7 °C)    Temp:  [97.5 °F (36.4 °C)-98 °F (36.7 °C)] 97.5 °F (36.4 °C)  HR:  [66-94] 71  Resp:  [15-23] 15  BP: (105-132)/(59-75) 117/59  SpO2:  [94 %-96 %] 96 %  Body mass index is 31.16 kg/m².     Input and Output Summary (last 24 hours):     Intake/Output Summary (Last 24 hours) at 3/24/2024 1019  Last data filed at 3/24/2024 0901  Gross per 24 hour   Intake 868.75 ml   Output 800 ml   Net 68.75 ml       Physical Exam:   Physical Exam  Vitals and nursing note reviewed.   Constitutional:       General: He is not in acute distress.     Appearance: He is ill-appearing (Chronically ill-appearing, appears much older than stated age of 53). He is not toxic-appearing.      Comments: Patient responds to verbal stimuli, he is lethargic.   HENT:      Head: Normocephalic and atraumatic.   Eyes:      General: Scleral icterus present.   Cardiovascular:      Rate  and Rhythm: Normal rate.      Pulses: Normal pulses.      Heart sounds: Normal heart sounds. No murmur heard.     No gallop.   Pulmonary:      Effort: Pulmonary effort is normal. No respiratory distress.      Breath sounds: Normal breath sounds. No wheezing.   Musculoskeletal:         General: No tenderness. Normal range of motion.      Cervical back: Normal range of motion.      Right lower leg: Edema present.      Left lower leg: Edema present.   Skin:     General: Skin is warm and dry.      Coloration: Skin is jaundiced.      Findings: No bruising or erythema.   Neurological:      General: No focal deficit present.      Cranial Nerves: No cranial nerve deficit.          Additional Data:     Labs:  Results from last 7 days   Lab Units 03/24/24  0609   WBC Thousand/uL 6.43   HEMOGLOBIN g/dL 10.1*   HEMATOCRIT % 30.2*   PLATELETS Thousands/uL 81*   NEUTROS PCT % 48   LYMPHS PCT % 25   MONOS PCT % 16*   EOS PCT % 8*     Results from last 7 days   Lab Units 03/23/24  0400 03/22/24  0347   SODIUM mmol/L 130* 135   POTASSIUM mmol/L 3.9 3.3*   CHLORIDE mmol/L 102 101   CO2 mmol/L 25 29   BUN mg/dL 5 5   CREATININE mg/dL 0.73 0.79   ANION GAP mmol/L 3* 5   CALCIUM mg/dL 7.3* 7.1*   ALBUMIN g/dL  --  1.8*   TOTAL BILIRUBIN mg/dL  --  11.45*   ALK PHOS U/L  --  96   ALT U/L  --  17   AST U/L  --  57*   GLUCOSE RANDOM mg/dL 111 135     Results from last 7 days   Lab Units 03/24/24  0609   INR  3.59*             Results from last 7 days   Lab Units 03/24/24  0609 03/21/24  0540 03/21/24  0455 03/20/24  1930 03/20/24  1725 03/20/24  1453 03/20/24  1355 03/20/24  1313   LACTIC ACID mmol/L  --  2.0  --  3.2* 3.2* 3.5*   < >  --    PROCALCITONIN ng/ml 0.10  --  0.09  --   --   --   --  0.09    < > = values in this interval not displayed.       Lines/Drains:  Invasive Devices       Peripheral Intravenous Line  Duration             Peripheral IV 03/21/24 Dorsal (posterior);Left Forearm 3 days    Peripheral IV 03/24/24  Left;Upper;Ventral (anterior) Arm <1 day              Drain  Duration             Chest Tube 1 Right 8.5 Fr. 3 days    External Urinary Catheter Small 3 days                          Imaging: No pertinent imaging reviewed.    Recent Cultures (last 7 days):   Results from last 7 days   Lab Units 03/20/24  1559 03/20/24  1339   BLOOD CULTURE   --  No Growth at 72 hrs.  No Growth at 72 hrs.   GRAM STAIN RESULT  No polys seen  No organisms seen  --    BODY FLUID CULTURE, STERILE  No growth  --        Last 24 Hours Medication List:   Current Facility-Administered Medications   Medication Dose Route Frequency Provider Last Rate    albumin human  25 g Intravenous Once CARLOS Boyd      carvedilol  6.25 mg Oral BID With Meals Ilan Rosario DO      [START ON 3/25/2024] chlordiazePOXIDE  5 mg Oral Once Ilan Rosario DO      FLUoxetine  20 mg Oral Daily Ilan Rosario DO      folic acid  1 mg Oral Daily Ilan Rosario DO      furosemide  60 mg Oral BID (diuretic) Aubrey Gonzalez DO      heparin (porcine)  5,000 Units Subcutaneous Q8H ADRIAN Ilan Rosario DO      lactulose  30 g Oral Daily Ilan Rosario DO      lamoTRIgine  25 mg Oral Daily Ilan Rosario DO      magnesium Oxide  400 mg Oral Daily Ilan Rosario DO      multivitamin-minerals  1 tablet Oral Daily Ilan Rosario DO      pantoprazole  40 mg Oral Early Morning Ilan Rosario DO      phytonadione  5 mg Oral Daily Matt Cat MD PhD      rifaximin  550 mg Oral Daily Ilan Rosario DO      [START ON 3/25/2024] spironolactone  50 mg Oral Daily Aubrey Gonzalez DO      thiamine  100 mg Oral Daily Ilan Rosario DO          Today, Patient Was Seen By: Aubrey Gonzalez DO    **Please Note: This note may have been constructed using a voice recognition system.**

## 2024-03-24 NOTE — PLAN OF CARE
Problem: PHYSICAL THERAPY ADULT  Goal: Performs mobility at highest level of function for planned discharge setting.  See evaluation for individualized goals.  Description: Treatment/Interventions: ADL retraining, Functional transfer training, LE strengthening/ROM, Elevations, Therapeutic exercise, Endurance training, Patient/family training, Equipment eval/education, Bed mobility, Gait training, Compensatory technique education, Spoke to nursing, OT          See flowsheet documentation for full assessment, interventions and recommendations.  Outcome: Not Progressing  Note: Prognosis: Fair  Problem List: Decreased strength, Decreased endurance, Impaired balance, Decreased mobility, Impaired judgement, Decreased safety awareness, Obesity  Assessment: Pt is a 53 y.o. male seen for PT evaluation s/p admission to [facility] on 3/20/2024 with Pleural effusion, right.  Order placed for PT services.  Upon evaluation: Pt is presenting with impaired functional mobility due to decreased strength, decreased endurance, impaired balance, gait deviations, altered sensation, fall risk, and LE edema requiring  SPV assistance for bed mobility and min - steadying - standby assistance for transfers and ambulation with no AD vs. RW . Pt's clinical presentation is currently unstable/unpredictable given the functional mobility deficits above, especially decreased activity tolerance and decreased functional mobility tolerance, coupled with fall risks as indicated by AM-PAC 6-Clicks: 18/24 as well as hx of falls and obesity and combined with medical complications of abnormal CBC, increased RR, and need for input for mobility technique/safety.  Pt's PMHx and comorbidities that may affect physical performance and progress include: depression, HTN, obesity, and neuropathy. Personal factors affecting pt at time of IE include: questionable non-compliance, anxiety, depression, step(s) to enter environment, inability to  perform IADLs, inability to perform ADLs, inability to navigate level surfaces without external assistance, inability to navigate community distances, recent fall(s)/fall history, and ETOH use. Pt will benefit from continued skilled PT services to address deficits as defined above and to maximize level of functional mobility to facilitate return toward PLOF and improved QOL. From PT/mobility standpoint, recommendation at time of d/c would be Level II (Moderate Resource Intensity pending progress in order to reduce fall risk and maximize pt's functional independence and consistency with mobility in order to facilitate return to PLOF.  Recommend trial with walker next 1-2 sessions, ther ex next 1-2 sessions, and stair navigation .  Barriers to Discharge: Decreased caregiver support, Inaccessible home environment  Barriers to Discharge Comments: Pt lives alone and requires significant A for all mobility/ADL's, fall risk, LAMONTE  Rehab Resource Intensity Level, PT: II (Moderate Resource Intensity)    See flowsheet documentation for full assessment.

## 2024-03-25 ENCOUNTER — APPOINTMENT (INPATIENT)
Dept: RADIOLOGY | Facility: HOSPITAL | Age: 54
End: 2024-03-25
Payer: COMMERCIAL

## 2024-03-25 LAB
ALBUMIN SERPL BCP-MCNC: 2.1 G/DL (ref 3.5–5)
ALP SERPL-CCNC: 76 U/L (ref 34–104)
ALT SERPL W P-5'-P-CCNC: 12 U/L (ref 7–52)
ANION GAP SERPL CALCULATED.3IONS-SCNC: 5 MMOL/L (ref 4–13)
AST SERPL W P-5'-P-CCNC: 31 U/L (ref 13–39)
BACTERIA BLD CULT: NORMAL
BACTERIA BLD CULT: NORMAL
BASOPHILS # BLD AUTO: 0.1 THOUSANDS/ÂΜL (ref 0–0.1)
BASOPHILS NFR BLD AUTO: 2 % (ref 0–1)
BILIRUB SERPL-MCNC: 12.12 MG/DL (ref 0.2–1)
BUN SERPL-MCNC: 8 MG/DL (ref 5–25)
CALCIUM ALBUM COR SERPL-MCNC: 9.5 MG/DL (ref 8.3–10.1)
CALCIUM SERPL-MCNC: 8 MG/DL (ref 8.4–10.2)
CHLORIDE SERPL-SCNC: 100 MMOL/L (ref 96–108)
CO2 SERPL-SCNC: 27 MMOL/L (ref 21–32)
CREAT SERPL-MCNC: 0.84 MG/DL (ref 0.6–1.3)
EOSINOPHIL # BLD AUTO: 0.47 THOUSAND/ÂΜL (ref 0–0.61)
EOSINOPHIL NFR BLD AUTO: 7 % (ref 0–6)
ERYTHROCYTE [DISTWIDTH] IN BLOOD BY AUTOMATED COUNT: 13.1 % (ref 11.6–15.1)
GFR SERPL CREATININE-BSD FRML MDRD: 101 ML/MIN/1.73SQ M
GLUCOSE SERPL-MCNC: 98 MG/DL (ref 65–140)
HCT VFR BLD AUTO: 30.7 % (ref 36.5–49.3)
HGB BLD-MCNC: 10.4 G/DL (ref 12–17)
IMM GRANULOCYTES # BLD AUTO: 0.07 THOUSAND/UL (ref 0–0.2)
IMM GRANULOCYTES NFR BLD AUTO: 1 % (ref 0–2)
INR PPP: 3.03 (ref 0.84–1.19)
LYMPHOCYTES # BLD AUTO: 1.59 THOUSANDS/ÂΜL (ref 0.6–4.47)
LYMPHOCYTES NFR BLD AUTO: 25 % (ref 14–44)
MAGNESIUM SERPL-MCNC: 1.7 MG/DL (ref 1.9–2.7)
MCH RBC QN AUTO: 34.3 PG (ref 26.8–34.3)
MCHC RBC AUTO-ENTMCNC: 33.9 G/DL (ref 31.4–37.4)
MCV RBC AUTO: 101 FL (ref 82–98)
MONOCYTES # BLD AUTO: 1.23 THOUSAND/ÂΜL (ref 0.17–1.22)
MONOCYTES NFR BLD AUTO: 19 % (ref 4–12)
NEUTROPHILS # BLD AUTO: 2.99 THOUSANDS/ÂΜL (ref 1.85–7.62)
NEUTS SEG NFR BLD AUTO: 46 % (ref 43–75)
NRBC BLD AUTO-RTO: 0 /100 WBCS
PHOSPHATE SERPL-MCNC: 3.7 MG/DL (ref 2.7–4.5)
PLATELET # BLD AUTO: 73 THOUSANDS/UL (ref 149–390)
PMV BLD AUTO: 9.3 FL (ref 8.9–12.7)
POTASSIUM SERPL-SCNC: 4 MMOL/L (ref 3.5–5.3)
PROCALCITONIN SERPL-MCNC: 0.11 NG/ML
PROT SERPL-MCNC: 4.5 G/DL (ref 6.4–8.4)
PROTHROMBIN TIME: 31.7 SECONDS (ref 11.6–14.5)
RBC # BLD AUTO: 3.03 MILLION/UL (ref 3.88–5.62)
SODIUM SERPL-SCNC: 132 MMOL/L (ref 135–147)
WBC # BLD AUTO: 6.45 THOUSAND/UL (ref 4.31–10.16)

## 2024-03-25 PROCEDURE — 84100 ASSAY OF PHOSPHORUS: CPT | Performed by: INTERNAL MEDICINE

## 2024-03-25 PROCEDURE — 84145 PROCALCITONIN (PCT): CPT | Performed by: INTERNAL MEDICINE

## 2024-03-25 PROCEDURE — 85610 PROTHROMBIN TIME: CPT | Performed by: INTERNAL MEDICINE

## 2024-03-25 PROCEDURE — 71045 X-RAY EXAM CHEST 1 VIEW: CPT

## 2024-03-25 PROCEDURE — 88112 CYTOPATH CELL ENHANCE TECH: CPT | Performed by: PATHOLOGY

## 2024-03-25 PROCEDURE — 83735 ASSAY OF MAGNESIUM: CPT | Performed by: INTERNAL MEDICINE

## 2024-03-25 PROCEDURE — 97116 GAIT TRAINING THERAPY: CPT

## 2024-03-25 PROCEDURE — 97535 SELF CARE MNGMENT TRAINING: CPT

## 2024-03-25 PROCEDURE — 97530 THERAPEUTIC ACTIVITIES: CPT

## 2024-03-25 PROCEDURE — 85025 COMPLETE CBC W/AUTO DIFF WBC: CPT | Performed by: INTERNAL MEDICINE

## 2024-03-25 PROCEDURE — 88305 TISSUE EXAM BY PATHOLOGIST: CPT | Performed by: PATHOLOGY

## 2024-03-25 PROCEDURE — 99232 SBSQ HOSP IP/OBS MODERATE 35: CPT | Performed by: PHYSICIAN ASSISTANT

## 2024-03-25 PROCEDURE — 80053 COMPREHEN METABOLIC PANEL: CPT | Performed by: INTERNAL MEDICINE

## 2024-03-25 RX ORDER — MAGNESIUM SULFATE 1 G/100ML
1 INJECTION INTRAVENOUS ONCE
Status: COMPLETED | OUTPATIENT
Start: 2024-03-25 | End: 2024-03-25

## 2024-03-25 RX ADMIN — PHYTONADIONE 5 MG: 5 TABLET ORAL at 08:26

## 2024-03-25 RX ADMIN — HEPARIN SODIUM 5000 UNITS: 5000 INJECTION INTRAVENOUS; SUBCUTANEOUS at 21:20

## 2024-03-25 RX ADMIN — HEPARIN SODIUM 5000 UNITS: 5000 INJECTION INTRAVENOUS; SUBCUTANEOUS at 14:26

## 2024-03-25 RX ADMIN — MAGNESIUM SULFATE HEPTAHYDRATE 1 G: 1 INJECTION, SOLUTION INTRAVENOUS at 11:58

## 2024-03-25 RX ADMIN — Medication 1 TABLET: at 08:26

## 2024-03-25 RX ADMIN — CARVEDILOL 6.25 MG: 6.25 TABLET, FILM COATED ORAL at 18:07

## 2024-03-25 RX ADMIN — FUROSEMIDE 60 MG: 20 TABLET ORAL at 19:39

## 2024-03-25 RX ADMIN — Medication 400 MG: at 08:26

## 2024-03-25 RX ADMIN — LACTULOSE 30 G: 10 SOLUTION ORAL at 08:23

## 2024-03-25 RX ADMIN — HEPARIN SODIUM 5000 UNITS: 5000 INJECTION INTRAVENOUS; SUBCUTANEOUS at 06:19

## 2024-03-25 RX ADMIN — PANTOPRAZOLE SODIUM 40 MG: 40 TABLET, DELAYED RELEASE ORAL at 06:19

## 2024-03-25 RX ADMIN — FOLIC ACID 1 MG: 1 TABLET ORAL at 08:25

## 2024-03-25 RX ADMIN — RIFAXIMIN 550 MG: 550 TABLET ORAL at 08:25

## 2024-03-25 RX ADMIN — THIAMINE HCL TAB 100 MG 100 MG: 100 TAB at 08:26

## 2024-03-25 RX ADMIN — CARVEDILOL 6.25 MG: 6.25 TABLET, FILM COATED ORAL at 10:42

## 2024-03-25 RX ADMIN — FUROSEMIDE 60 MG: 20 TABLET ORAL at 10:41

## 2024-03-25 RX ADMIN — LAMOTRIGINE 25 MG: 25 TABLET ORAL at 08:26

## 2024-03-25 RX ADMIN — SPIRONOLACTONE 50 MG: 25 TABLET ORAL at 10:40

## 2024-03-25 RX ADMIN — FLUOXETINE 20 MG: 20 CAPSULE ORAL at 08:25

## 2024-03-25 NOTE — PROGRESS NOTES
Patient:    MRN:  [id number]    Aidin Request ID:  [id number]    Level of care reserved:  Skilled Nursing Facility    Partner Reserved:  [place] [address] [phone]    Clinical needs requested:    Geography searched:  15 miles around [id number]    Start of Service:    Request sent:  9:47am EDT on 3/25/2024 by Eliz Haley    Partner reserved:  4:52pm EDT on 3/25/2024 by Eliz Haley    Choice list shared:  4:37pm EDT on 3/25/2024 by Eliz Haley

## 2024-03-25 NOTE — CASE MANAGEMENT
Case Management Discharge Planning Note    Patient name Curt Dominguez  Location /-01 MRN [id number]  : 1970 Date 3/25/2024       Current Admission Date: 3/20/2024  Current Admission Diagnosis:Pleural effusion, right   Patient Active Problem List    Diagnosis Date Noted    Macrocytic anemia 2024    Hypokalemia 2024    Hypertension 2024    GERD (gastroesophageal reflux disease) 2024    Alcohol abuse 2024    Pleural effusion, right 2024    Alcoholic cirrhosis (HCC) 2024    Thrombocytopenia (HCC) 2024    SERGEI (obstructive sleep apnea) 2024    Elevated INR 2024    Serum total bilirubin elevated 2024    Ambulatory dysfunction 2023    Bipolar depression (HCC) 2014      LOS (days): 5  Geometric Mean LOS (GMLOS) (days):   Days to GMLOS:     OBJECTIVE:  Risk of Unplanned Readmission Score: 19.45         Current admission status: Inpatient   Preferred Pharmacy:   [facility] [address] [facility]  [address] [facility] [address]  Phone: [phone] Fax: [phone]    [facility]/pharmacy #[facility] [place], [place] - [address] [place] [address]  Phone: [phone] Fax: [phone]    Primary Care Provider: Leonela Oseguera MD    Primary Insurance: [facility]  Secondary Insurance:     DISCHARGE DETAILS:    Discharge planning discussed with:: patient  Freedom of Choice: Yes  Comments - Freedom of Choice: reviewed the STR that is willing to accept him- [facility] and [facility], his choice is [facility].                Contacts  Patient Contacts: Maribell Dominguez (Friend)  [phone]  Relationship to Patient:: Family (wife/ not divorce - phone number is not in service)  Reason/Outcome: Discharge Planning              Other Referral/Resources/Interventions Provided:  Interventions: Short Term Rehab  Referral Comments: Accepted [facility] and  requested CM support to initiate the auth  - Pending authorization       Treatment Team Recommendation: Short Term Rehab  Discharge Destination Plan:: Short Term Rehab  Transport at Discharge : Wheelchair van

## 2024-03-25 NOTE — ASSESSMENT & PLAN NOTE
Appreciate critical care team input for pleural effusion management, additional fluid removed 3/24

## 2024-03-25 NOTE — PLAN OF CARE
Problem: PHYSICAL THERAPY ADULT  Goal: Performs mobility at highest level of function for planned discharge setting.  See evaluation for individualized goals.  Description: Treatment/Interventions: ADL retraining, Functional transfer training, LE strengthening/ROM, Elevations, Therapeutic exercise, Endurance training, Patient/family training, Equipment eval/education, Bed mobility, Gait training, Compensatory technique education, Spoke to nursing, OT          See flowsheet documentation for full assessment, interventions and recommendations.  Outcome: Progressing  Note: Prognosis: Fair  Problem List: Decreased strength, Decreased endurance, Impaired balance, Decreased mobility, Decreased safety awareness, Obesity, Impaired sensation  Assessment: Pt tolerated session fairly. He requires increased sitting rest breaks between activities as he fatigues quickly. He was able to complete bed mobility, transfers and ambulation this date with decreased assistance compared to previous session yesterday, but continues to require physical assistance with all mobility  for wt shifting and balance. He ambulates short distances and requires increased assistance for standing dynamic balance associated with increased retropulsion. He requires cues for improved gait pattern, transfer techniques and anterior wt shifting with standing balance. He is limited by decreased strength, balance, endurance. He will continue to benefit from PT services to maximize LOF.  Barriers to Discharge: Decreased caregiver support, Inaccessible home environment  Barriers to Discharge Comments: lives alone, requires assistance wiht all mobility, increased fall risk  Rehab Resource Intensity Level, PT: II (Moderate Resource Intensity)    See flowsheet documentation for full assessment.

## 2024-03-25 NOTE — ASSESSMENT & PLAN NOTE
Chronic alcohol use, appreciate GI input.  Continue diuretics as blood pressure allows.  Now completed with librium taper  Monitor for signs of withdrawal   Alcohol cessation recommended, case management following  Patient wishes to attend IOP

## 2024-03-25 NOTE — ASSESSMENT & PLAN NOTE
Secondary to cirrhosis  Platelet count trending up  Transfuse for plt <20k  Can maintain on VTE heparin

## 2024-03-25 NOTE — ASSESSMENT & PLAN NOTE
Does tend to run soft BP now with his cirrhosis  Continue Coreg, Lasix and Aldactone with hold parameters.

## 2024-03-25 NOTE — ASSESSMENT & PLAN NOTE
Decompensated alcohol induced liver cirrhosis.  Suspected hepatic encephalopathy, continue lactulose, repeat ammonia level wnl  Increased diuretics 3/24 - Lasix 60 mg p.o. twice daily, hold for systolic blood pressure less than 90.  Continue Aldactone 50 mg daily, consider increased dose, monitor electrolytes, hold Aldactone for systolic blood pressure less than 100.  Will follow-up with additional GI recommendations, consult note reviewed.    MELD 3.0: 31 at 3/25/2024  4:45 AM  MELD-Na: 30 at 3/25/2024  4:45 AM  Calculated from:  Serum Creatinine: 0.84 mg/dL (Using min of 1 mg/dL) at 3/25/2024  4:45 AM  Serum Sodium: 132 mmol/L at 3/25/2024  4:45 AM  Total Bilirubin: 12.12 mg/dL at 3/25/2024  4:45 AM  Serum Albumin: 2.1 g/dL at 3/25/2024  4:45 AM  INR(ratio): 3.03 at 3/25/2024  4:45 AM  Age at listing (hypothetical): 53 years  Sex: Male at 3/25/2024  4:45 AM

## 2024-03-25 NOTE — UTILIZATION REVIEW
Continued Stay Review for 3/24/24    Date: 3/25/24                           Current Patient Class: IP  Current Level of Care: MS    HPI:53 y.o. male initially admitted on 3/20/24 - DX:  Pleural effusion, right / Alcoholic cirrhosis / Ambulatory dysfunction / Macrocytic anemia  / Thrombocytopenia /   Hypokalemia / Elevated INR     Assessment/Plan:   3/24/24: patient is severely debilitated, Patient responds to verbal stimuli, he is lethargic.  Scleral icterus present; skin Is jaundiced; Plts 81; INR 3.59  Chest tube removed; MELD-Na equal to 29, with estimated 90-day mortality of 32%. Mondry score of 97  Plan: Will remove another 2 L today recd albumin iv x1; rec'd Mg sulf iv x1; increase Furosemide from 60 daily to BID; continue with aldactone as 50 mg daily; start daily vit K 5 mg po; continued librium taper; continue lactulose; repeat ammonia level; monitor labs; follow-up with PT OT evaluation, patient will likely benefit from short-term rehab.     Vital Signs:               Date/Time Temp Pulse Resp BP MAP (mmHg) SpO2 O2 Device O2 Interface Device Patient Position - Orthostatic VS   03/24/24 2300 98.3 °F (36.8 °C) 69 20 119/67 87 97 % None (Room air) -- Lying   03/24/24 2100 -- -- -- -- -- 95 % -- -- --   03/24/24 2000 -- 62 -- -- -- -- -- -- --   03/24/24 1900 98.4 °F (36.9 °C) 63 20 113/64 83 98 % None (Room air) -- Lying   03/24/24 1745 -- 73 21 97/60 60 Abnormal  96 % -- -- --   03/24/24 1600 -- 62 -- 112/64 -- -- -- -- --   03/24/24 1459 97.6 °F (36.4 °C) -- -- 119/68 89 -- -- -- Lying   03/24/24 1200 -- 69 -- 122/56 -- -- -- -- --   03/24/24 1146 96.9 °F (36.1 °C) Abnormal  71 23 Abnormal  122/56 80 97 % -- -- Lying   03/24/24 0800 -- 71 15 117/59 -- 96 % -- -- --   03/24/24 0753 -- 73 -- -- -- -- -- -- --   03/24/24 0719 97.5 °F (36.4 °C) 72 -- 112/68 85 -- -- -- --   03/24/24 0600 97.8 °F (36.6 °C) 94 23 Abnormal  119/60 83 95 % None (Room air) -- --   03/24/24 0200 98 °F (36.7 °C) 71 17 106/61 79 96 %  None (Room air)           Pertinent Labs/Diagnostic Results:     Results from last 7 days   Lab Units 03/25/24 0445 03/24/24  0609 03/23/24  0400 03/22/24 0347 03/21/24  0455   WBC Thousand/uL 6.45 6.43 6.77 6.79 6.03   HEMOGLOBIN g/dL 10.4* 10.1* 8.9* 9.5* 10.2*   HEMATOCRIT % 30.7* 30.2* 26.8* 28.7* 30.8*   PLATELETS Thousands/uL 73* 81* 66* 65* 58*   NEUTROS ABS Thousands/µL 2.99 3.09  --  3.79 3.08       Results from last 7 days   Lab Units 03/25/24 0445 03/24/24  0609 03/23/24  0400 03/22/24 0347 03/21/24 0455 03/20/24  1725   SODIUM mmol/L 132*  --  130* 135 136 133*   POTASSIUM mmol/L 4.0  --  3.9 3.3* 3.9 3.5   CHLORIDE mmol/L 100  --  102 101 103 99   CO2 mmol/L 27  --  25 29 28 28   ANION GAP mmol/L 5  --  3* 5 5 6   BUN mg/dL 8  --  5 5 3* 3*   CREATININE mg/dL 0.84  --  0.73 0.79 0.64 0.69   EGFR ml/min/1.73sq m 101  --  107 103 113 109   CALCIUM mg/dL 8.0*  --  7.3* 7.1* 7.1* 7.5*   CALCIUM, IONIZED mmol/L  --   --   --   --  1.03*  --    MAGNESIUM mg/dL 1.7* 1.7* 1.6*  --  1.3*  --    PHOSPHORUS mg/dL 3.7 3.0  --   --  2.7  --      Results from last 7 days   Lab Units 03/25/24 0445 03/24/24  0950 03/23/24  0400 03/22/24 0347 03/21/24  0455 03/20/24  1725 03/20/24  1313   AST U/L 31  --   --  57* 76* 86* 91*   ALT U/L 12  --   --  17 21 24 26   ALK PHOS U/L 76  --   --  96 95 102 115*   TOTAL PROTEIN g/dL 4.5*  --   --  4.6* 5.0* 5.6* 6.2*   ALBUMIN g/dL 2.1*  --   --  1.8* 1.8* 2.0* 2.2*   TOTAL BILIRUBIN mg/dL 12.12*  --   --  11.45* 12.06* 14.64* 15.33*   BILIRUBIN DIRECT mg/dL  --   --  6.08*  --   --   --   --    AMMONIA umol/L  --  57  --   --   --  44  --        Results from last 7 days   Lab Units 03/25/24  0445 03/23/24  0400 03/22/24  0347 03/21/24  0455 03/20/24  1725 03/20/24  1313   GLUCOSE RANDOM mg/dL 98 111 135 77 96 163*       Results from last 7 days   Lab Units 03/20/24  1453 03/20/24  1313   HS TNI 0HR ng/L  --  17   HS TNI 2HR ng/L 17  --    HSTNI D2 ng/L 0  --        Results  from last 7 days   Lab Units 03/25/24  0445 03/24/24  0609 03/22/24  0347 03/21/24  0455 03/20/24  1313   PROTIME seconds 31.7* 36.1* 35.1*   < > 30.1*   INR  3.03* 3.59* 3.46*   < > 2.84*   PTT seconds  --   --   --   --  50*    < > = values in this interval not displayed.     Results from last 7 days   Lab Units 03/20/24  1313   TSH 3RD GENERATON uIU/mL 3.317     Results from last 7 days   Lab Units 03/25/24  0445 03/24/24  0609 03/21/24  0455 03/20/24  1313   PROCALCITONIN ng/ml 0.11 0.10 0.09 0.09     Results from last 7 days   Lab Units 03/21/24  0540 03/20/24  1930 03/20/24  1725 03/20/24  1453 03/20/24  1355   LACTIC ACID mmol/L 2.0 3.2* 3.2* 3.5* 4.4*       Results from last 7 days   Lab Units 03/20/24  1725 03/20/24  1313   BNP pg/mL 130* 133*     Results from last 7 days   Lab Units 03/22/24  0347   FERRITIN ng/mL 166   IRON ug/dL 71   TIBC ug/dL <126*     Results from last 7 days   Lab Units 03/20/24  1849   CLARITY UA  Clear   COLOR UA  Barbara   SPEC GRAV UA  1.010   PH UA  7.0   GLUCOSE UA mg/dl 100 (1/10%)*   KETONES UA mg/dl Negative   BLOOD UA  Large*   PROTEIN UA mg/dl 30 (1+)*   NITRITE UA  Negative   BILIRUBIN UA  Large*   UROBILINOGEN UA E.U./dl >=8.0*   LEUKOCYTES UA  Negative   WBC UA /hpf 0-1   RBC UA /hpf 1-2   BACTERIA UA /hpf Occasional   EPITHELIAL CELLS WET PREP /hpf None Seen       Results from last 7 days   Lab Units 03/20/24  1725   ETHANOL LVL mg/dL 120*       Results from last 7 days   Lab Units 03/20/24  1559 03/20/24  1339   BLOOD CULTURE   --  No Growth After 4 Days.  No Growth After 4 Days.   GRAM STAIN RESULT  No polys seen  No organisms seen  --    BODY FLUID CULTURE, STERILE  No growth  --      Results from last 7 days   Lab Units 03/20/24  4469   TOTAL COUNTED  100   WBC FLUID /ul 127       Medications:   Scheduled Medications:  carvedilol, 6.25 mg, Oral, BID With Meals  FLUoxetine, 20 mg, Oral, Daily  folic acid, 1 mg, Oral, Daily  furosemide, 60 mg, Oral, BID  (diuretic)  heparin (porcine), 5,000 Units, Subcutaneous, Q8H ADRIAN  lactulose, 30 g, Oral, Daily  lamoTRIgine, 25 mg, Oral, Daily  magnesium Oxide, 400 mg, Oral, Daily  multivitamin-minerals, 1 tablet, Oral, Daily  pantoprazole, 40 mg, Oral, Early Morning  phytonadione, 5 mg, Oral, Daily  rifaximin, 550 mg, Oral, Daily  spironolactone, 50 mg, Oral, Daily  thiamine, 100 mg, Oral, Daily    magnesium sulfate 2 g/50 mL IVPB (premix) 2 g  Dose: 2 g  Freq: Once Route: IV  Last Dose: 2 g (03/24/24 0752)  Start: 03/24/24 0715 End: 03/24/24 0952     albumin human (FLEXBUMIN) 5 % injection 25 g  Dose: 25 g  Freq: Once Route: IV  Last Dose: Stopped (03/24/24 1111)  Start: 03/24/24 0930 End: 03/24/24 1111       Continuous IV Infusions: None     PRN Meds: None       Discharge Plan: TBD    Network Utilization Review Department  ATTENTION: Please call with any questions or concerns to [phone] and carefully listen to the prompts so that you are directed to the right person. All voicemails are confidential.   For Discharge needs, contact Care Management DC Support Team at [phone] opt. [phone]  Send all requests for admission clinical reviews, approved or denied determinations and any other requests to dedicated fax number below belonging to the campus where the patient is receiving treatment. List of dedicated fax numbers for the Facilities:  FACILITY NAME UR FAX NUMBER   ADMISSION DENIALS (Administrative/Medical Necessity) [phone]   DISCHARGE SUPPORT TEAM (NETWORK) [phone]   PARENT CHILD HEALTH (Maternity/NICU/Pediatrics) [phone]   [facility] [phone]   [facility] [phone]   [facility] [phone]   [facility] [phone]   [facility] [phone]   [facility] [phone]   [facility] [phone]   [facility]  [facility] [phone]   [facility] [phone]   [facility] [phone]   [facility] [phone]   [facility] [phone]

## 2024-03-25 NOTE — OCCUPATIONAL THERAPY NOTE
Occupational Therapy Progress Note     Patient Name: Curt Dominguez  Today's Date: 3/25/2024  Problem List  Principal Problem:    Pleural effusion, right  Active Problems:    Alcohol abuse    Alcoholic cirrhosis (HCC)    Thrombocytopenia (HCC)    Ambulatory dysfunction    Bipolar depression (HCC)    SERGEI (obstructive sleep apnea)    Elevated INR    Macrocytic anemia    Hypokalemia    Hypertension    GERD (gastroesophageal reflux disease)            03/25/24 1601   OT Last Visit   OT Visit Date 03/25/24   Note Type   Note Type Treatment   Pain Assessment   Pain Assessment Tool 0-10   Pain Score No Pain   Restrictions/Precautions   Other Precautions Chair Alarm;Bed Alarm;Fall Risk   ADL   Where Assessed Edge of bed   Grooming Assistance 5  Supervision/Setup   Grooming Deficit Wash/dry hands;Wash/dry face;Supervision/safety;Setup;Verbal cueing  (shampoo cap)   UB Dressing Assistance 5  Supervision/Setup   UB Dressing Deficit Other (Comment);Setup  (don robe and hospital gown)   LB Dressing Assistance 4  Minimal Assistance   LB Dressing Deficit Don/doff R sock;Don/doff L sock;Thread LLE into underwear;Thread RLE into underwear   LB Dressing Comments Pt donned socks via hip hike while seated EOB. Min A to thread fully over ankle. Mod A to don incontinence briefs.   Toileting Assistance  4  Minimal Assistance   Toileting Deficit Clothing management up;Clothing management down   Toileting Comments [place] catheter on   Bed Mobility   Supine to Sit   (SBA)   Additional items HOB elevated;Increased time required;Verbal cues   Transfers   Sit to Stand 4  Minimal assistance   Additional items Assist x 1;Increased time required;Verbal cues   Stand to Sit 4  Minimal assistance   Additional items Assist x 1;Increased time required;Verbal cues   Stand pivot 3  Moderate assistance   Additional items Assist x 1;Increased time required;Verbal cues   Additional Comments RW in stance; cued for hand placement during transfers to  increase safety   Cognition   Orientation Level Oriented X4   Following Commands Follows one step commands without difficulty   Activity Tolerance   Activity Tolerance Patient limited by fatigue   Medical Staff Made Aware PTMichelle   Assessment   Assessment Curt was received asleep in bed and motivated to participate in treatment session. Pt completed ADL routine while seated EOB with increased time to complete tasks. Min A for LB ADLs. Supervision with set up for UB ADLs. Min A x 1 for transfers with RW. Functional performance remains limited by deconditioning and impaired standing balance/tolerance. Continue OT POC to progress goals.   Plan   Treatment Interventions ADL retraining;Functional transfer training;UE strengthening/ROM;Endurance training;Patient/family training;Equipment evaluation/education;Compensatory technique education;Continued evaluation;Energy conservation;Activityengagement   Goal Expiration Date 04/04/24   OT Treatment Day 3   OT Frequency 3-5x/wk   Discharge Recommendation   Rehab Resource Intensity Level, OT II (Moderate Resource Intensity)   AM-PAC Daily Activity Inpatient   Lower Body Dressing 3   Bathing 3   Toileting 3   Upper Body Dressing 3   Grooming 3   Eating 4   Daily Activity Raw Score 19   Daily Activity Standardized Score (Calc for Raw Score >=11) 40.22   AM-PAC Applied Cognition Inpatient   Following a Speech/Presentation 3   Understanding Ordinary Conversation 4   Taking Medications 2   Remembering Where Things Are Placed or Put Away 3   Remembering List of 4-5 Errands 3   Taking Care of Complicated Tasks 3   Applied Cognition Raw Score 18   Applied Cognition Standardized Score 38.07     Assessment:  The patient's raw score on the AM-PAC Daily Activity Inpatient Short Form is 11. A raw score of less than 19 suggests the patient may benefit from discharge to post-acute rehabilitation services. Please refer to the recommendation of the Occupational Therapist for safe discharge  planning. Pt seen for co-treatment with skilled Physical Therapy due to clinically unstable presentation, medical complexity, fall risk, functional balance, limited activity tolerance which is a decline from PLOF and may impact overall safety.        Alesia Black, OTR/L

## 2024-03-25 NOTE — CASE MANAGEMENT
Case Management Discharge Planning Note    Patient name Curt Dominguez  Location /-01 MRN [id number]  : 1970 Date 3/25/2024       Current Admission Date: 3/20/2024  Current Admission Diagnosis:Pleural effusion, right   Patient Active Problem List    Diagnosis Date Noted    Macrocytic anemia 2024    Hypokalemia 2024    Hypertension 2024    GERD (gastroesophageal reflux disease) 2024    Alcohol abuse 2024    Pleural effusion, right 2024    Alcoholic cirrhosis (HCC) 2024    Thrombocytopenia (HCC) 2024    SERGEI (obstructive sleep apnea) 2024    Elevated INR 2024    Serum total bilirubin elevated 2024    Ambulatory dysfunction 2023    Bipolar depression (HCC) 2014      LOS (days): 5  Geometric Mean LOS (GMLOS) (days):   Days to GMLOS:     OBJECTIVE:  Risk of Unplanned Readmission Score: 19.55         Current admission status: Inpatient   Preferred Pharmacy:   [facility] [address] [facility]  [address] [facility] [address]  Phone: [phone] Fax: [phone]    [facility]/pharmacy [address] [place] [address] - [address] [place] [address]  Phone: [phone] Fax: [phone]    Primary Care Provider: Leonela Oseguera MD    [facility]  Secondary Insurance:     DISCHARGE DETAILS:    Chart reviewed, recommendation is for STR.  Spoke to patient.    Discharge planning discussed with:: patient  Freedom of Choice: Yes  Comments - Freedom of Choice: Discussed dc needs. Level 2 is recommended, patient resides alone, patient in agreement for STR, agreeable for a blanket referral, asked not to go to [facility] as he was there in the past.  CM contacted family/caregiver?: No- see comments (declined)          Requested Home Health Care         Is the patient interested in HHC at discharge?: No    DME Referral  Provided  Referral made for DME?: No    Other Referral/Resources/Interventions Provided:  Interventions: Short Term Rehab  Referral Comments: [place] referral was made for STR         Treatment Team Recommendation: Short Term Rehab  Discharge Destination Plan:: Short Term Rehab  Transport at Discharge : Wheelchair van      CM will follow up with referrals.

## 2024-03-25 NOTE — PLAN OF CARE
Problem: PAIN - ADULT  Goal: Verbalizes/displays adequate comfort level or baseline comfort level  Description: Interventions:  - Encourage patient to monitor pain and request assistance  - Assess pain using appropriate pain scale  - Administer analgesics based on type and severity of pain and evaluate response  - Implement non-pharmacological measures as appropriate and evaluate response  - Consider cultural and social influences on pain and pain management  - Notify physician/advanced practitioner if interventions unsuccessful or patient reports new pain  Outcome: Progressing     Problem: INFECTION - ADULT  Goal: Absence or prevention of progression during hospitalization  Description: INTERVENTIONS:  - Assess and monitor for signs and symptoms of infection  - Monitor lab/diagnostic results  - Monitor all insertion sites, i.e. indwelling lines, tubes, and drains  - Monitor endotracheal if appropriate and nasal secretions for changes in amount and color  - [place] appropriate cooling/warming therapies per order  - Administer medications as ordered  - Instruct and encourage patient and family to use good hand hygiene technique  - Identify and instruct in appropriate isolation precautions for identified infection/condition  Outcome: Progressing  Goal: Absence of fever/infection during neutropenic period  Description: INTERVENTIONS:  - Monitor WBC    Outcome: Progressing     Problem: SAFETY ADULT  Goal: Patient will remain free of falls  Description: INTERVENTIONS:  - Educate patient/family on patient safety including physical limitations  - Instruct patient to call for assistance with activity   - Consult OT/PT to assist with strengthening/mobility   - Keep Call bell within reach  - Keep bed low and locked with side rails adjusted as appropriate  - Keep care items and personal belongings within reach  - Initiate and maintain comfort rounds  - Make Fall Risk Sign visible to staff  - Offer Toileting every 2 Hours,  in advance of need  - Initiate/Maintain bed alarm  - Obtain necessary fall risk management equipment: call bell in reach.alarm activated  - Apply yellow socks and bracelet for high fall risk patients  - Consider moving patient to room near nurses station  Outcome: Progressing  Goal: Maintain or return to baseline ADL function  Description: INTERVENTIONS:  -  Assess patient's ability to carry out ADLs; assess patient's baseline for ADL function and identify physical deficits which impact ability to perform ADLs (bathing, care of mouth/teeth, toileting, grooming, dressing, etc.)  - Assess/evaluate cause of self-care deficits   - Assess range of motion  - Assess patient's mobility; develop plan if impaired  - Assess patient's need for assistive devices and provide as appropriate  - Encourage maximum independence but intervene and supervise when necessary  - Involve family in performance of ADLs  - Assess for home care needs following discharge   - Consider OT consult to assist with ADL evaluation and planning for discharge  - Provide patient education as appropriate  Outcome: Progressing  Goal: Maintains/Returns to pre admission functional level  Description: INTERVENTIONS:  - Perform AM-PAC 6 Click Basic Mobility/ Daily Activity assessment daily.  - Set and communicate daily mobility goal to care team and patient/family/caregiver.   - Collaborate with rehabilitation services on mobility goals if consulted  - Perform Range of Motion 2 times a day.  - Reposition patient every 2 hours.  - Dangle patient 3 times a day  - Stand patient 3 times a day  - Ambulate patient 3 times a day  - Out of bed to chair 3 times a day   - Out of bed for meals 3 times a day  - Out of bed for toileting  - Record patient progress and toleration of activity level   Outcome: Progressing     Problem: DISCHARGE PLANNING  Goal: Discharge to home or other facility with appropriate resources  Description: INTERVENTIONS:  - Identify barriers to  discharge w/patient and caregiver  - Arrange for needed discharge resources and transportation as appropriate  - Identify discharge learning needs (meds, wound care, etc.)  - Arrange for interpretive services to assist at discharge as needed  - Refer to Case Management Department for coordinating discharge planning if the patient needs post-hospital services based on physician/advanced practitioner order or complex needs related to functional status, cognitive ability, or social support system  Outcome: Progressing     Problem: Knowledge Deficit  Goal: Patient/family/caregiver demonstrates understanding of disease process, treatment plan, medications, and discharge instructions  Description: Complete learning assessment and assess knowledge base.  Interventions:  - Provide teaching at level of understanding  - Provide teaching via preferred learning methods  Outcome: Progressing     Problem: Prexisting or High Potential for Compromised Skin Integrity  Goal: Skin integrity is maintained or improved  Description: INTERVENTIONS:  - Identify patients at risk for skin breakdown  - Assess and monitor skin integrity  - Assess and monitor nutrition and hydration status  - Monitor labs   - Assess for incontinence   - Turn and reposition patient  - Assist with mobility/ambulation  - Relieve pressure over bony prominences  - Avoid friction and shearing  - Provide appropriate hygiene as needed including keeping skin clean and dry  - Evaluate need for skin moisturizer/barrier cream  - Collaborate with interdisciplinary team   - Patient/family teaching  - Consider wound care consult   Outcome: Progressing     Problem: NEUROSENSORY - ADULT  Goal: Remains free of injury related to seizures activity  Description: INTERVENTIONS  - Maintain airway, patient safety  and administer oxygen as ordered  - Monitor patient for seizure activity, document and report duration and description of seizure to physician/advanced practitioner  - If  seizure occurs,  ensure patient safety during seizure  - Reorient patient post seizure  - Seizure pads on all 4 side rails  - Instruct patient/family to notify RN of any seizure activity including if an aura is experienced  - Instruct patient/family to call for assistance with activity based on nursing assessment  - Administer anti-seizure medications if ordered    Outcome: Progressing  Goal: Achieves maximal functionality and self care  Description: INTERVENTIONS  - Monitor swallowing and airway patency with patient fatigue and changes in neurological status  - Encourage and assist patient to increase activity and self care.   - Encourage visually impaired, hearing impaired and aphasic patients to use assistive/communication devices  Outcome: Progressing

## 2024-03-25 NOTE — PROGRESS NOTES
[facility]  [facility]  Name: Curt Dominguez I  MRN: [id number]  Unit/Bed#: -01 I Date of Admission: 3/20/2024   Date of Service: 3/25/2024 I Hospital Day: 5    Assessment/Plan   GERD (gastroesophageal reflux disease)  Assessment & Plan  Continue PPI    Hypertension  Assessment & Plan  Does tend to run soft BP now with his cirrhosis  Continue Coreg, Lasix and Aldactone with hold parameters.    Hypokalemia  Assessment & Plan  Monitor potassium level daily and replace as needed  Patient may benefit from increased dose of Aldactone.    Macrocytic anemia  Assessment & Plan  Folate level low  Initiated on folic acid supplement    Elevated INR  Assessment & Plan  P.o. vitamin K started with improvement noted in INR  Continue to trend level    SERGEI (obstructive sleep apnea)  Assessment & Plan  Patient noncompliant with CPAP    Bipolar depression (HCC)  Assessment & Plan  Continue Prozac    Ambulatory dysfunction  Assessment & Plan  Continue PT OT, patient recommended for short term rehab  He is agreeable to rehab so long as it lasts under 29 days  CM to assist    Thrombocytopenia (HCC)  Assessment & Plan  Secondary to cirrhosis  Platelet count trending up  Transfuse for plt <20k  Can maintain on VTE heparin    Alcoholic cirrhosis (HCC)  Assessment & Plan  Decompensated alcohol induced liver cirrhosis.  Suspected hepatic encephalopathy, continue lactulose, repeat ammonia level wnl  Increased diuretics 3/24 - Lasix 60 mg p.o. twice daily, hold for systolic blood pressure less than 90.  Continue Aldactone 50 mg daily, consider increased dose, monitor electrolytes, hold Aldactone for systolic blood pressure less than 100.  Will follow-up with additional GI recommendations, consult note reviewed.    MELD 3.0: 31 at 3/25/2024  4:45 AM  MELD-Na: 30 at 3/25/2024  4:45 AM  Calculated from:  Serum Creatinine: 0.84 mg/dL (Using min of 1 mg/dL) at 3/25/2024  4:45 AM  Serum Sodium: 132 mmol/L at  3/25/2024  4:45 AM  Total Bilirubin: 12.12 mg/dL at 3/25/2024  4:45 AM  Serum Albumin: 2.1 g/dL at 3/25/2024  4:45 AM  INR(ratio): 3.03 at 3/25/2024  4:45 AM  Age at listing (hypothetical): 53 years  Sex: Male at 3/25/2024  4:45 AM        Alcohol abuse  Assessment & Plan  Chronic alcohol use, appreciate GI input.  Continue diuretics as blood pressure allows.  Now completed with librium taper  Monitor for signs of withdrawal   Alcohol cessation recommended, case management following  Patient wishes to attend IOP    * Pleural effusion, right  Assessment & Plan  Appreciate critical care team input for pleural effusion management, additional fluid removed 3/24               VTE Pharmacologic Prophylaxis:   High Risk (Score >/= 5) - Pharmacological DVT Prophylaxis Ordered: heparin. Sequential Compression Devices Ordered.    Mobility:   Basic Mobility Inpatient Raw Score: 13  JH-HLM Goal: 4: Move to chair/commode  JH-HLM Achieved: 3: Sit at edge of bed  JH-HLM Goal NOT achieved. Continue with multidisciplinary rounding and encourage appropriate mobility to improve upon JH-HLM goals.    Patient Centered Rounds: I performed bedside rounds with nursing staff today.   Discussions with Specialists or Other Care Team Provider: case management    Education and Discussions with Family / Patient: Patient declined call to .     Total Time Spent on Date of Encounter in care of patient: 46 mins. This time was spent on one or more of the following: performing physical exam; counseling and coordination of care; obtaining or reviewing history; documenting in the medical record; reviewing/ordering tests, medications or procedures; communicating with other healthcare professionals and discussing with patient's family/caregivers.    Current Length of Stay: 5 day(s)  Current Patient Status: Inpatient   Certification Statement: The patient will continue to require additional inpatient hospital stay due to safe discharge  planning, awaiting final read on CXR to determine next steps for pulm care  Discharge Plan: Anticipate discharge tomorrow to rehab facility.    Code Status: Level 1 - Full Code    Subjective:   Patient reports his breathing feels good and he denies any SOB or CP. No confusion noted by nursing.     Objective:     Vitals:   Temp (24hrs), Av.2 °F (36.8 °C), Min:97.6 °F (36.4 °C), Max:98.6 °F (37 °C)    Temp:  [97.6 °F (36.4 °C)-98.6 °F (37 °C)] 98.6 °F (37 °C)  HR:  [62-82] 69  Resp:  [12-25] 20  BP: ()/(53-68) 82/55  SpO2:  [83 %-98 %] 96 %  Body mass index is 30.08 kg/m².     Input and Output Summary (last 24 hours):     Intake/Output Summary (Last 24 hours) at 3/25/2024 1152  Last data filed at 3/25/2024 1001  Gross per 24 hour   Intake 1181.25 ml   Output 2455 ml   Net -1273.75 ml       Physical Exam:   Physical Exam  Vitals and nursing note reviewed.   Constitutional:       Appearance: He is obese. He is ill-appearing.   Cardiovascular:      Rate and Rhythm: Normal rate and regular rhythm.      Pulses: Normal pulses.      Heart sounds: Normal heart sounds.   Pulmonary:      Effort: Pulmonary effort is normal.      Breath sounds: Normal breath sounds.   Abdominal:      General: Bowel sounds are normal.      Palpations: Abdomen is soft.      Tenderness: There is no abdominal tenderness. There is no guarding or rebound.   Musculoskeletal:      Right lower leg: Edema present.      Left lower leg: Edema present.   Skin:     Coloration: Skin is jaundiced.   Neurological:      Mental Status: He is alert. Mental status is at baseline.   Psychiatric:         Mood and Affect: Mood normal.         Behavior: Behavior normal.          Additional Data:     Labs:  Results from last 7 days   Lab Units 24  0445   WBC Thousand/uL 6.45   HEMOGLOBIN g/dL 10.4*   HEMATOCRIT % 30.7*   PLATELETS Thousands/uL 73*   NEUTROS PCT % 46   LYMPHS PCT % 25   MONOS PCT % 19*   EOS PCT % 7*     Results from last 7 days   Lab Units  03/25/24  0445   SODIUM mmol/L 132*   POTASSIUM mmol/L 4.0   CHLORIDE mmol/L 100   CO2 mmol/L 27   BUN mg/dL 8   CREATININE mg/dL 0.84   ANION GAP mmol/L 5   CALCIUM mg/dL 8.0*   ALBUMIN g/dL 2.1*   TOTAL BILIRUBIN mg/dL 12.12*   ALK PHOS U/L 76   ALT U/L 12   AST U/L 31   GLUCOSE RANDOM mg/dL 98     Results from last 7 days   Lab Units 03/25/24  0445   INR  3.03*             Results from last 7 days   Lab Units 03/25/24  0445 03/24/24  0609 03/21/24  0540 03/21/24  0455 03/20/24  1930 03/20/24  1725 03/20/24  1453 03/20/24  1355 03/20/24  1313   LACTIC ACID mmol/L  --   --  2.0  --  3.2* 3.2* 3.5*   < >  --    PROCALCITONIN ng/ml 0.11 0.10  --  0.09  --   --   --   --  0.09    < > = values in this interval not displayed.       Lines/Drains:  Invasive Devices       Peripheral Intravenous Line  Duration             Peripheral IV 03/21/24 Dorsal (posterior);Left Forearm 4 days    Peripheral IV 03/24/24 Left;Upper;Ventral (anterior) Arm 1 day              Drain  Duration             External Urinary Catheter Small 4 days                          Imaging: Personally reviewed the following imaging: xray(s)    Recent Cultures (last 7 days):   Results from last 7 days   Lab Units 03/20/24  1559 03/20/24  1339   BLOOD CULTURE   --  No Growth After 4 Days.  No Growth After 4 Days.   GRAM STAIN RESULT  No polys seen  No organisms seen  --    BODY FLUID CULTURE, STERILE  No growth  --        Last 24 Hours Medication List:   Current Facility-Administered Medications   Medication Dose Route Frequency Provider Last Rate    carvedilol  6.25 mg Oral BID With Meals Ilan Rosario DO      FLUoxetine  20 mg Oral Daily Ilan Rosario DO      folic acid  1 mg Oral Daily Ilan Rosario DO      furosemide  60 mg Oral BID (diuretic) Aubrey Gonzalez DO      heparin (porcine)  5,000 Units Subcutaneous Q8H [facility] Ilan Rosario DO      lactulose  30 g Oral Daily Ilan Rosario DO      lamoTRIgine  25 mg Oral Daily Ilan  DO Marlene      magnesium Oxide  400 mg Oral Daily Ilan Rosario,       magnesium sulfate  1 g Intravenous Once Ebla Menon PA-C      multivitamin-minerals  1 tablet Oral Daily Ilan Rosario DO      pantoprazole  40 mg Oral Early Morning Ilan Rosario DO      phytonadione  5 mg Oral Daily Matt Cat MD PhD      rifaximin  550 mg Oral Daily Ilan Rosario DO      spironolactone  50 mg Oral Daily Aubrey Gonzalez, DO      thiamine  100 mg Oral Daily Ilan Rosario DO          Today, Patient Was Seen By: Elba Menon PA-C    **Please Note: This note may have been constructed using a voice recognition system.**

## 2024-03-25 NOTE — PLAN OF CARE
Problem: OCCUPATIONAL THERAPY ADULT  Goal: Performs self-care activities at highest level of function for planned discharge setting.  See evaluation for individualized goals.  Description: Treatment Interventions: ADL retraining, Functional transfer training, UE strengthening/ROM, Endurance training, Patient/family training, Equipment evaluation/education, Compensatory technique education, Continued evaluation, Energy conservation, Activityengagement     See flowsheet documentation for full assessment, interventions and recommendations.   Outcome: Progressing  Note: Limitation: Decreased ADL status, Decreased UE strength, Decreased Safe judgement during ADL, Decreased endurance, Decreased self-care trans, Decreased high-level ADLs  Prognosis: Fair  Assessment: Curt was received asleep in bed and motivated to participate in treatment session. Pt completed ADL routine while seated EOB with increased time to complete tasks. Min A for LB ADLs. Supervision with set up for UB ADLs. Min A x 1 for transfers with RW. Functional performance remains limited by deconditioning and impaired standing balance/tolerance. Continue OT POC to progress goals. The patient's raw score on the -PAC Daily Activity Inpatient Short Form is 19. A raw score of greater than or equal to 19 suggests the patient may benefit from discharge to post-acute rehabilitation services. Please refer to the recommendation of the Occupational Therapist for safe discharge planning.     Rehab Resource Intensity Level, OT: II (Moderate Resource Intensity)

## 2024-03-25 NOTE — ASSESSMENT & PLAN NOTE
Continue PT OT, patient recommended for short term rehab  He is agreeable to rehab so long as it lasts under 29 days  CM to assist

## 2024-03-25 NOTE — PHYSICAL THERAPY NOTE
"   PHYSICAL THERAPY TREATMENT NOTE  NAME:  Curt Dominguez  DATE: 03/25/24    Length Of Stay: 5  Performed at least 2 patient identifiers during session: Name and Birthday    TREATMENT FLOW SHEET:    03/25/24 1600   PT Last Visit   PT Visit Date 03/25/24   Note Type   Note Type Treatment   Pain Assessment   Pain Assessment Tool 0-10   Pain Score No Pain   Restrictions/Precautions   Other Precautions Chair Alarm;Bed Alarm;Fall Risk   General   Chart Reviewed Yes   Response to Previous Treatment Patient reporting fatigue but able to participate.;Patient with no complaints from previous session.   Cognition   Orientation Level Oriented X4   Following Commands Follows one step commands without difficulty   Subjective   Subjective \"I am always shakey.\"   Bed Mobility   Supine to Sit   (SBA)   Additional items Increased time required;Verbal cues;HOB elevated   Additional Comments HOB elevated > 30 degrees. inc time to complete.   Transfers   Sit to Stand 4  Minimal assistance   Additional items Assist x 1;Increased time required;Verbal cues   Stand to Sit 4  Minimal assistance   Additional items Assist x 1;Increased time required;Verbal cues   Stand pivot 3  Moderate assistance   Additional items Assist x 1;Increased time required;Verbal cues   Additional Comments use of RW. min cues for hand placement for safety with RW. inc posteiror lean upon standing. cues for ant wt shifting. dynamic standing balance with min/modAx1 with posteiror lean without UE support. spt with modAx1 with manual cues for wt shifting and verbal cues for turnig completely prior to sitting.   Ambulation/Elevation   Gait pattern Wide ELIS;Improper Weight shift;Shuffling;Short stride;Excessively slow   Gait Assistance   (modAx1-->minAx1)   Additional items Assist x 1;Verbal cues   Assistive Device Rolling walker   Distance ambulated 6'x1 wiht modAx1 with chair follow, manual cues for wt shifting and verbal cues for inc step lenght and foot clearance. " "pt with short shuffled steps, then step to pattenr leading with R BABAK. 2nd trial, ambulated 7'x1 with RW with miNAx1 with wide ELIS, improved wt shifting and step length however continues to require verbal cues for inc step length. further distances limited by fatigue. chair follow for each.   Balance   Static Sitting Fair +   Dynamic Sitting Fair   Static Standing Poor +   Dynamic Standing Poor   Ambulatory   (poor to poor +)   Endurance Deficit   Endurance Deficit Yes   Endurance Deficit Description quick onset fatigue   Activity Tolerance   Activity Tolerance Patient limited by fatigue   Medical Staff Made Aware OTAlesia   Exercises   Hip Flexion Sitting;10 reps;AROM;Bilateral   Knee AROM Long Arc Quad Sitting;10 reps;AROM;Bilateral   Ankle Pumps Sitting;10 reps;AROM;Bilateral   Assessment   Prognosis Fair   Problem List Decreased strength;Decreased endurance;Impaired balance;Decreased mobility;Decreased safety awareness;Obesity;Impaired sensation   Barriers to Discharge Decreased caregiver support;Inaccessible home environment   Barriers to Discharge Comments lives alone, requires assistance wiht all mobility, increased fall risk   Goals   Patient Goals \"Get stronger\"   [facility] Expiration Date 04/08/24   PT Treatment Day 3   Plan   Treatment/Interventions ADL retraining;Functional transfer training;LE strengthening/ROM;Elevations;Therapeutic exercise;Endurance training;Patient/family training;Equipment eval/education;Gait training;Bed mobility;Compensatory technique education;Spoke to nursing;Spoke to case management;OT   Progress Slow progress, decreased activity tolerance   PT Frequency 3-5x/wk   Discharge Recommendation   Rehab Resource Intensity Level, PT II (Moderate Resource Intensity)   AM-PAC Basic Mobility Inpatient   Turning in Flat Bed Without Bedrails 3   Lying on Back to Sitting on Edge of Flat Bed Without Bedrails 3   Moving Bed to Chair 2   Standing Up From Chair Using Arms 3   Walk in Room 3   Climb " 3-5 Stairs With Railing 1   Basic Mobility Inpatient Raw Score 15   Basic Mobility Standardized Score 36.97   [facility] Highest Level Of Mobility   -[facility] Goal 4: Move to chair/commode   -[facility] Achieved 6: Walk 10 steps or more   Education   Education Provided Mobility training;Home exercise program;Assistive device   Patient Demonstrates acceptance/verbal understanding   End of Consult   Patient Position at End of Consult Bedside chair;Bed/Chair alarm activated;All needs within reach         The patient's AM-PAC Basic Mobility Inpatient Short Form Raw Score is 15. A Raw score of less than or equal to 16 suggests the patient may benefit from discharge to post-acute rehabilitation services. Please also refer to the recommendation of the Physical Therapist for safe discharge planning.     Pt tolerated session fairly. He requires increased sitting rest breaks between activities as he fatigues quickly. He was able to complete bed mobility, transfers and ambulation this date with decreased assistance compared to previous session yesterday, but continues to require physical assistance with all mobility  for wt shifting and balance. He ambulates short distances and requires increased assistance for standing dynamic balance associated with increased retropulsion. He requires cues for improved gait pattern, transfer techniques and anterior wt shifting with standing balance. He is limited by decreased strength, balance, endurance. He will continue to benefit from PT services to maximize LOF.     Michelle Pineda, PT,DPT

## 2024-03-26 ENCOUNTER — APPOINTMENT (INPATIENT)
Dept: RADIOLOGY | Facility: HOSPITAL | Age: 54
End: 2024-03-26
Payer: COMMERCIAL

## 2024-03-26 LAB
ALBUMIN SERPL BCP-MCNC: 2.1 G/DL (ref 3.5–5)
ALP SERPL-CCNC: 70 U/L (ref 34–104)
ALT SERPL W P-5'-P-CCNC: 12 U/L (ref 7–52)
ANION GAP SERPL CALCULATED.3IONS-SCNC: 3 MMOL/L (ref 4–13)
AST SERPL W P-5'-P-CCNC: 30 U/L (ref 13–39)
BASE EX.OXY STD BLDV CALC-SCNC: 78 % (ref 60–80)
BASE EXCESS BLDV CALC-SCNC: 1 MMOL/L
BILIRUB DIRECT SERPL-MCNC: 4.97 MG/DL (ref 0–0.2)
BILIRUB SERPL-MCNC: 10.5 MG/DL (ref 0.2–1)
BUN SERPL-MCNC: 11 MG/DL (ref 5–25)
CALCIUM SERPL-MCNC: 7.9 MG/DL (ref 8.4–10.2)
CHLORIDE SERPL-SCNC: 100 MMOL/L (ref 96–108)
CO2 SERPL-SCNC: 29 MMOL/L (ref 21–32)
CREAT SERPL-MCNC: 0.95 MG/DL (ref 0.6–1.3)
ERYTHROCYTE [DISTWIDTH] IN BLOOD BY AUTOMATED COUNT: 13.4 % (ref 11.6–15.1)
GFR SERPL CREATININE-BSD FRML MDRD: 92 ML/MIN/1.73SQ M
GLUCOSE SERPL-MCNC: 103 MG/DL (ref 65–140)
HCO3 BLDV-SCNC: 25.5 MMOL/L (ref 24–30)
HCT VFR BLD AUTO: 29.7 % (ref 36.5–49.3)
HGB BLD-MCNC: 10 G/DL (ref 12–17)
INR PPP: 2.59 (ref 0.84–1.19)
MCH RBC QN AUTO: 34.1 PG (ref 26.8–34.3)
MCHC RBC AUTO-ENTMCNC: 33.7 G/DL (ref 31.4–37.4)
MCV RBC AUTO: 101 FL (ref 82–98)
O2 CT BLDV-SCNC: 12.9 ML/DL
PCO2 BLDV: 40.4 MM HG (ref 42–50)
PH BLDV: 7.42 [PH] (ref 7.3–7.4)
PLATELET # BLD AUTO: 76 THOUSANDS/UL (ref 149–390)
PMV BLD AUTO: 9.4 FL (ref 8.9–12.7)
PO2 BLDV: 45 MM HG (ref 35–45)
POTASSIUM SERPL-SCNC: 3.9 MMOL/L (ref 3.5–5.3)
PROT SERPL-MCNC: 4.5 G/DL (ref 6.4–8.4)
PROTHROMBIN TIME: 28.1 SECONDS (ref 11.6–14.5)
RBC # BLD AUTO: 2.93 MILLION/UL (ref 3.88–5.62)
SODIUM SERPL-SCNC: 132 MMOL/L (ref 135–147)
WBC # BLD AUTO: 5.98 THOUSAND/UL (ref 4.31–10.16)

## 2024-03-26 PROCEDURE — 85610 PROTHROMBIN TIME: CPT

## 2024-03-26 PROCEDURE — 80048 BASIC METABOLIC PNL TOTAL CA: CPT

## 2024-03-26 PROCEDURE — 85027 COMPLETE CBC AUTOMATED: CPT

## 2024-03-26 PROCEDURE — 82805 BLOOD GASES W/O2 SATURATION: CPT

## 2024-03-26 PROCEDURE — 99232 SBSQ HOSP IP/OBS MODERATE 35: CPT

## 2024-03-26 PROCEDURE — 80076 HEPATIC FUNCTION PANEL: CPT

## 2024-03-26 PROCEDURE — 71045 X-RAY EXAM CHEST 1 VIEW: CPT

## 2024-03-26 RX ORDER — FUROSEMIDE 40 MG/1
40 TABLET ORAL DAILY
Status: DISCONTINUED | OUTPATIENT
Start: 2024-03-27 | End: 2024-03-27

## 2024-03-26 RX ORDER — SPIRONOLACTONE 100 MG/1
100 TABLET, FILM COATED ORAL DAILY
Status: DISCONTINUED | OUTPATIENT
Start: 2024-03-27 | End: 2024-03-29 | Stop reason: HOSPADM

## 2024-03-26 RX ORDER — FUROSEMIDE 40 MG/1
40 TABLET ORAL
Status: DISCONTINUED | OUTPATIENT
Start: 2024-03-26 | End: 2024-03-26

## 2024-03-26 RX ADMIN — FOLIC ACID 1 MG: 1 TABLET ORAL at 09:26

## 2024-03-26 RX ADMIN — HEPARIN SODIUM 5000 UNITS: 5000 INJECTION INTRAVENOUS; SUBCUTANEOUS at 05:30

## 2024-03-26 RX ADMIN — FUROSEMIDE 60 MG: 20 TABLET ORAL at 09:30

## 2024-03-26 RX ADMIN — FLUOXETINE 20 MG: 20 CAPSULE ORAL at 09:26

## 2024-03-26 RX ADMIN — PHYTONADIONE 5 MG: 5 TABLET ORAL at 09:26

## 2024-03-26 RX ADMIN — LACTULOSE 30 G: 10 SOLUTION ORAL at 09:26

## 2024-03-26 RX ADMIN — RIFAXIMIN 550 MG: 550 TABLET ORAL at 09:26

## 2024-03-26 RX ADMIN — THIAMINE HCL TAB 100 MG 100 MG: 100 TAB at 09:26

## 2024-03-26 RX ADMIN — PANTOPRAZOLE SODIUM 40 MG: 40 TABLET, DELAYED RELEASE ORAL at 05:30

## 2024-03-26 RX ADMIN — LAMOTRIGINE 25 MG: 25 TABLET ORAL at 09:26

## 2024-03-26 RX ADMIN — SPIRONOLACTONE 50 MG: 25 TABLET ORAL at 09:30

## 2024-03-26 RX ADMIN — Medication 400 MG: at 09:26

## 2024-03-26 RX ADMIN — HEPARIN SODIUM 5000 UNITS: 5000 INJECTION INTRAVENOUS; SUBCUTANEOUS at 13:49

## 2024-03-26 RX ADMIN — CARVEDILOL 6.25 MG: 6.25 TABLET, FILM COATED ORAL at 15:49

## 2024-03-26 RX ADMIN — HEPARIN SODIUM 5000 UNITS: 5000 INJECTION INTRAVENOUS; SUBCUTANEOUS at 21:32

## 2024-03-26 RX ADMIN — Medication 1 TABLET: at 09:26

## 2024-03-26 NOTE — CASE MANAGEMENT
Case Management Discharge Planning Note    Patient name Curt Dominguez  Location /-01 MRN [id number]  : 1970 Date 3/26/2024       Current Admission Date: 3/20/2024  Current Admission Diagnosis:Pleural effusion, right   Patient Active Problem List    Diagnosis Date Noted    Macrocytic anemia 2024    Hypokalemia 2024    Hypertension 2024    GERD (gastroesophageal reflux disease) 2024    Alcohol abuse 2024    Pleural effusion, right 2024    Alcoholic cirrhosis (HCC) 2024    Thrombocytopenia (HCC) 2024    SERGEI (obstructive sleep apnea) 2024    Elevated INR 2024    Serum total bilirubin elevated 2024    Ambulatory dysfunction 2023    Bipolar depression (HCC) 2014      LOS (days): 6  Geometric Mean LOS (GMLOS) (days):   Days to GMLOS:     OBJECTIVE:  Risk of Unplanned Readmission Score: 19.48         Current admission status: Inpatient   [place]   [facility] [place] [address] [facility]  [address] [facility] [address]  Phone: [phone] Fax: [phone]    [facility]/pharmacy #[id number] [place] - [address] [place] [id number]  Phone: [phone] Fax: [phone]    Primary Care Provider: Leonela Oseguera MD    Primary Insurance: MARCIAL PEREIRA  Secondary Insurance:     DISCHARGE DETAILS:                                                                                                               Facility Insurance Auth Number: [id number]

## 2024-03-26 NOTE — CASE MANAGEMENT
[facility] received request for authorization from Care Manager.  Authorization request for: SNF  Facility Name: [facility] NPI: [id number]  Facility MD: Dr. Augustine NPI: [id number]   Authorization initiated by contacting insurance: [employer]   Via: Fax  Clinicals submitted via: fax # [phone]    Care Manager notified: Eliz Haley

## 2024-03-26 NOTE — ASSESSMENT & PLAN NOTE
Appreciate critical care team input for pleural effusion management, additional fluid removed 3/24  Repeat CXR pending   On room air

## 2024-03-26 NOTE — PROGRESS NOTES
[facility]  Progress Note  Name: Curt Dominguez I  MRN: [id number]  Unit/Bed#: -Bran I Date of Admission: 3/20/2024   Date of Service: 3/26/2024 I Hospital Day: 6    Assessment/Plan   * Pleural effusion, right  Assessment & Plan  Appreciate critical care team input for pleural effusion management, additional fluid removed 3/24  Repeat CXR pending   On room air    GERD (gastroesophageal reflux disease)  Assessment & Plan  Continue PPI    Hypertension  Assessment & Plan  Does tend to run soft BP now with his cirrhosis  Continue Coreg, Lasix and Aldactone with hold parameters.    Hypokalemia  Assessment & Plan  Monitor potassium level daily and replace as needed      Macrocytic anemia  Assessment & Plan  Folate level low  Initiated on folic acid supplement    Elevated INR  Assessment & Plan  P.o. vitamin K started with improvement noted in INR  Continue to trend level    SERGEI (obstructive sleep apnea)  Assessment & Plan  Patient noncompliant with CPAP  May be contributing to patient's tiredness during the day     Bipolar depression (HCC)  Assessment & Plan  Continue Prozac    Ambulatory dysfunction  Assessment & Plan  Continue PT OT, patient recommended for short term rehab  He is agreeable to rehab so long as it lasts under 29 days  CM to assist    Thrombocytopenia (HCC)  Assessment & Plan  Secondary to cirrhosis  Platelet count trending up  Transfuse for plt <20k  Can maintain on VTE heparin    Alcoholic cirrhosis (HCC)  Assessment & Plan  Decompensated alcohol induced liver cirrhosis.  Suspected hepatic encephalopathy, continue lactulose, repeat ammonia level wnl  Previously on Lasix 60 mg twice daily, and Aldactone 50 mg daily - discussed with GI, see recs below   GI following, appreciate recs  Will monitor patient on Lasix 40 mg, and spironolactone 100 mg    MELD 3.0: 30 at 3/26/2024  9:18 AM  MELD-Na: 29 at 3/26/2024  9:18 AM  Calculated from:  Serum Creatinine: 0.95 mg/dL (Using  min of 1 mg/dL) at 3/26/2024  9:18 AM  Serum Sodium: 132 mmol/L at 3/26/2024  9:18 AM  Total Bilirubin: 12.12 mg/dL at 3/25/2024  4:45 AM  Serum Albumin: 2.1 g/dL at 3/25/2024  4:45 AM  INR(ratio): 2.59 at 3/26/2024  9:18 AM  Age at listing (hypothetical): 53 years  Sex: Male at 3/26/2024  9:18 AM        Alcohol abuse  Assessment & Plan  Chronic alcohol use, appreciate GI input.  Continue diuretics as blood pressure allows.  Now completed with librium taper  Monitor for signs of withdrawal   Alcohol cessation recommended, case management following  Patient wishes to attend [facility]         VTE Pharmacologic Prophylaxis:   High Risk (Score >/= 5) - Pharmacological DVT Prophylaxis Ordered: heparin. Sequential Compression Devices Ordered.    Mobility:   Basic Mobility Inpatient Raw Score: 15  JH-HLM Goal: 4: Move to chair/commode  JH-HLM Achieved: 4: Move to chair/commode  JH-HLM Goal achieved. Continue to encourage appropriate mobility.    Patient Centered Rounds: I performed bedside rounds with nursing staff today.   Discussions with Specialists or Other Care Team Provider: GI    Education and Discussions with Family / Patient: Updated  (friend) at bedside.    Total Time Spent on Date of Encounter in care of patient: This time was spent on one or more of the following: performing physical exam; counseling and coordination of care; obtaining or reviewing history; documenting in the medical record; reviewing/ordering tests, medications or procedures; communicating with other healthcare professionals and discussing with patient's family/caregivers.    Current Length of Stay: 6 day(s)  Current Patient Status: Inpatient   Certification Statement: The patient will continue to require additional inpatient hospital stay due to monitoring diuretic therapy  Discharge Plan: Anticipate discharge tomorrow to rehab facility.    Code Status: Level 1 - Full Code    Subjective:   Seen and examined. Patient sleeping upon my  "evaluation. States he is more tired than usual. Slept \"on and off\" overnight. States he is eating and drinking without difficulty. Urinating and having BM as normal. Denies chest pain, SOB, N/V/D.     Objective:     Vitals:   Temp (24hrs), Av.6 °F (36.4 °C), Min:97.2 °F (36.2 °C), Max:97.7 °F (36.5 °C)    Temp:  [97.2 °F (36.2 °C)-97.7 °F (36.5 °C)] 97.7 °F (36.5 °C)  HR:  [66-81] 75  Resp:  [20] 20  BP: (108-128)/(57-72) 108/57  SpO2:  [92 %-98 %] 94 %  Body mass index is 30.27 kg/m².     Input and Output Summary (last 24 hours):     Intake/Output Summary (Last 24 hours) at 3/26/2024 1240  Last data filed at 3/26/2024 0910  Gross per 24 hour   Intake 640 ml   Output 2125 ml   Net -1485 ml       Physical Exam:   Physical Exam  Constitutional:       General: He is not in acute distress.     Appearance: He is ill-appearing.   Eyes:      General: Scleral icterus present.   Cardiovascular:      Rate and Rhythm: Normal rate.      Heart sounds: No murmur heard.     No friction rub. No gallop.   Pulmonary:      Breath sounds: No wheezing, rhonchi or rales.   Abdominal:      Tenderness: There is no abdominal tenderness.   Musculoskeletal:      Right lower leg: Edema present.      Left lower leg: Edema present.   Skin:     General: Skin is warm and dry.      Coloration: Skin is jaundiced.          Additional Data:     Labs:  Results from last 7 days   Lab Units 24  0918 24  0445   WBC Thousand/uL 5.98 6.45   HEMOGLOBIN g/dL 10.0* 10.4*   HEMATOCRIT % 29.7* 30.7*   PLATELETS Thousands/uL 76* 73*   NEUTROS PCT %  --  46   LYMPHS PCT %  --  25   MONOS PCT %  --  19*   EOS PCT %  --  7*     Results from last 7 days   Lab Units 24  0918   SODIUM mmol/L 132*   POTASSIUM mmol/L 3.9   CHLORIDE mmol/L 100   CO2 mmol/L 29   BUN mg/dL 11   CREATININE mg/dL 0.95   ANION GAP mmol/L 3*   CALCIUM mg/dL 7.9*   ALBUMIN g/dL 2.1*   TOTAL BILIRUBIN mg/dL 10.50*   ALK PHOS U/L 70   ALT U/L 12   AST U/L 30   GLUCOSE RANDOM " mg/dL 103     Results from last 7 days   Lab Units 03/26/24  0918   INR  2.59*             Results from last 7 days   Lab Units 03/25/24  0445 03/24/24  0609 03/21/24  0540 03/21/24  0455 03/20/24  1930 03/20/24  1725 03/20/24  1453 03/20/24  1355 03/20/24  1313   LACTIC ACID mmol/L  --   --  2.0  --  3.2* 3.2* 3.5*   < >  --    PROCALCITONIN ng/ml 0.11 0.10  --  0.09  --   --   --   --  0.09    < > = values in this interval not displayed.       Lines/Drains:  Invasive Devices       Peripheral Intravenous Line  Duration             Peripheral IV 03/24/24 Left;Upper;Ventral (anterior) Arm 2 days              Drain  Duration             External Urinary Catheter Small 5 days                          Imaging: Reviewed radiology reports from this admission including: chest xray    Recent Cultures (last 7 days):   Results from last 7 days   Lab Units 03/20/24  1559 03/20/24  1339   BLOOD CULTURE   --  No Growth After 5 Days.  No Growth After 5 Days.   GRAM STAIN RESULT  No polys seen  No organisms seen  --    BODY FLUID CULTURE, STERILE  No growth  --        Last 24 Hours Medication List:   Current Facility-Administered Medications   Medication Dose Route Frequency Provider Last Rate    carvedilol  6.25 mg Oral BID With Meals Ilan Rosario DO      FLUoxetine  20 mg Oral Daily Ilan Rosario DO      folic acid  1 mg Oral Daily Ilan Rosario DO      furosemide  40 mg Oral BID (diuretic) Brenda Galdamez PA-C      heparin (porcine)  5,000 Units Subcutaneous Q8H Counts include [facility] beds at the [facility] Ilan Rosario DO      lactulose  30 g Oral Daily Ilan Rosario DO      lamoTRIgine  25 mg Oral Daily Ilan Rosario DO      magnesium Oxide  400 mg Oral Daily Ilan Rosario DO      multivitamin-minerals  1 tablet Oral Daily Ilan Rosario DO      pantoprazole  40 mg Oral Early Morning Ilan Rosario DO      phytonadione  5 mg Oral Daily Elba Menon PA-C      rifaximin  550 mg Oral Daily Ilan Rosario DO      [START ON 3/27/2024]  spironolactone  100 mg Oral Daily Brenda Galdamez PA-C      thiamine  100 mg Oral Daily Ilan Rosario DO          Today, Patient Was Seen By: Brenda Galdamez PA-C    **Please Note: This note may have been constructed using a voice recognition system.**

## 2024-03-26 NOTE — PLAN OF CARE
Problem: PAIN - ADULT  Goal: Verbalizes/displays adequate comfort level or baseline comfort level  Description: Interventions:  - Encourage patient to monitor pain and request assistance  - Assess pain using appropriate pain scale  - Administer analgesics based on type and severity of pain and evaluate response  - Implement non-pharmacological measures as appropriate and evaluate response  - Consider cultural and social influences on pain and pain management  - Notify physician/advanced practitioner if interventions unsuccessful or patient reports new pain  Outcome: Progressing     Problem: INFECTION - ADULT  Goal: Absence or prevention of progression during hospitalization  Description: INTERVENTIONS:  - Assess and monitor for signs and symptoms of infection  - Monitor lab/diagnostic results  - Monitor all insertion sites, i.e. indwelling lines, tubes, and drains  - Monitor endotracheal if appropriate and nasal secretions for changes in amount and color  - [place] appropriate cooling/warming therapies per order  - Administer medications as ordered  - Instruct and encourage patient and family to use good hand hygiene technique  - Identify and instruct in appropriate isolation precautions for identified infection/condition  Outcome: Progressing  Goal: Absence of fever/infection during neutropenic period  Description: INTERVENTIONS:  - Monitor WBC    Outcome: Progressing     Problem: SAFETY ADULT  Goal: Patient will remain free of falls  Description: INTERVENTIONS:  - Educate patient/family on patient safety including physical limitations  - Instruct patient to call for assistance with activity   - Consult OT/PT to assist with strengthening/mobility   - Keep Call bell within reach  - Keep bed low and locked with side rails adjusted as appropriate  - Keep care items and personal belongings within reach  - Initiate and maintain comfort rounds  - Make Fall Risk Sign visible to staff  - Offer Toileting every 2 Hours,  in advance of need  - Initiate/Maintain bed alarm  - Obtain necessary fall risk management equipment: call bell in reach.alarm activated  - Apply yellow socks and bracelet for high fall risk patients  - Consider moving patient to room near nurses station  Outcome: Progressing  Goal: Maintain or return to baseline ADL function  Description: INTERVENTIONS:  -  Assess patient's ability to carry out ADLs; assess patient's baseline for ADL function and identify physical deficits which impact ability to perform ADLs (bathing, care of mouth/teeth, toileting, grooming, dressing, etc.)  - Assess/evaluate cause of self-care deficits   - Assess range of motion  - Assess patient's mobility; develop plan if impaired  - Assess patient's need for assistive devices and provide as appropriate  - Encourage maximum independence but intervene and supervise when necessary  - Involve family in performance of ADLs  - Assess for home care needs following discharge   - Consider OT consult to assist with ADL evaluation and planning for discharge  - Provide patient education as appropriate  Outcome: Progressing  Goal: Maintains/Returns to pre admission functional level  Description: INTERVENTIONS:  - Perform AM-PAC 6 Click Basic Mobility/ Daily Activity assessment daily.  - Set and communicate daily mobility goal to care team and patient/family/caregiver.   - Collaborate with rehabilitation services on mobility goals if consulted  - Perform Range of Motion 2 times a day.  - Reposition patient every 2 hours.  - Dangle patient 3 times a day  - Stand patient 3 times a day  - Ambulate patient 3 times a day  - Out of bed to chair 3 times a day   - Out of bed for meals 3 times a day  - Out of bed for toileting  - Record patient progress and toleration of activity level   Outcome: Progressing     Problem: DISCHARGE PLANNING  Goal: Discharge to home or other facility with appropriate resources  Description: INTERVENTIONS:  - Identify barriers to  discharge w/patient and caregiver  - Arrange for needed discharge resources and transportation as appropriate  - Identify discharge learning needs (meds, wound care, etc.)  - Arrange for interpretive services to assist at discharge as needed  - Refer to Case Management Department for coordinating discharge planning if the patient needs post-hospital services based on physician/advanced practitioner order or complex needs related to functional status, cognitive ability, or social support system  Outcome: Progressing

## 2024-03-26 NOTE — PLAN OF CARE
Problem: METABOLIC, FLUID AND ELECTROLYTES - ADULT  Goal: Electrolytes maintained within normal limits  Description: INTERVENTIONS:  - Monitor labs and assess patient for signs and symptoms of electrolyte imbalancesconfusion  - Administer electrolyte replacement as ordered  - Monitor response to electrolyte replacements, including repeat lab results as appropriate  - Instruct patient on fluid and nutrition as appropriate  Outcome: Progressing

## 2024-03-26 NOTE — CASE MANAGEMENT
[facility] has received APPROVED authorization.  Insurance: [employer]   Called in by Rep: Gabriela ELENA#: [phone]  Authorization received for: SNF  Facility: [facility]    Authorization #: [id number]  Start of Care: 03/26  Next Review Date: 2 Business Days   Submit next review to #: [phone]     Care Manager notified: Eliz Haley

## 2024-03-26 NOTE — CASE MANAGEMENT
Case Management Discharge Planning Note    Patient name Curt Dominguez  Location /-01 MRN [id number]  : 1970 Date 3/26/2024       Current Admission Date: 3/20/2024  Current Admission Diagnosis:Pleural effusion, right   Patient Active Problem List    Diagnosis Date Noted    Macrocytic anemia 2024    Hypokalemia 2024    Hypertension 2024    GERD (gastroesophageal reflux disease) 2024    Alcohol abuse 2024    Pleural effusion, right 2024    Alcoholic cirrhosis (HCC) 2024    Thrombocytopenia (HCC) 2024    SERGEI (obstructive sleep apnea) 2024    Elevated INR 2024    Serum total bilirubin elevated 2024    Ambulatory dysfunction 2023    Bipolar depression (HCC) 2014      LOS (days): 6  Geometric Mean LOS (GMLOS) (days):   Days to GMLOS:     OBJECTIVE:  Risk of Unplanned Readmission Score: 19.48         Current admission status: Inpatient   Preferred Pharmacy:   [facility] [address] [facility]  [address] [facility] [address]  Phone: [phone] Fax: [phone]    [facility]/pharmacy #[facility] [place], [place] - [address] [place] [address]  Phone: [phone] Fax: [phone]    Primary Care Provider: Leonela Oseguera MD    Primary Insurance: [employer]  Secondary Insurance:     DISCHARGE DETAILS:        CM noted auth received for [facility] STR, CM made Lisa Olivera aware MD wants to hold overnight for diuretics.

## 2024-03-27 LAB
ALBUMIN SERPL BCP-MCNC: 2 G/DL (ref 3.5–5)
ALP SERPL-CCNC: 75 U/L (ref 34–104)
ALT SERPL W P-5'-P-CCNC: 11 U/L (ref 7–52)
ANION GAP SERPL CALCULATED.3IONS-SCNC: 4 MMOL/L (ref 4–13)
AST SERPL W P-5'-P-CCNC: 30 U/L (ref 13–39)
BILIRUB SERPL-MCNC: 9.65 MG/DL (ref 0.2–1)
BUN SERPL-MCNC: 11 MG/DL (ref 5–25)
CALCIUM ALBUM COR SERPL-MCNC: 9.5 MG/DL (ref 8.3–10.1)
CALCIUM SERPL-MCNC: 7.9 MG/DL (ref 8.4–10.2)
CHLORIDE SERPL-SCNC: 98 MMOL/L (ref 96–108)
CO2 SERPL-SCNC: 30 MMOL/L (ref 21–32)
CREAT SERPL-MCNC: 0.93 MG/DL (ref 0.6–1.3)
ERYTHROCYTE [DISTWIDTH] IN BLOOD BY AUTOMATED COUNT: 13.2 % (ref 11.6–15.1)
GFR SERPL CREATININE-BSD FRML MDRD: 94 ML/MIN/1.73SQ M
GLUCOSE SERPL-MCNC: 153 MG/DL (ref 65–140)
HCT VFR BLD AUTO: 29.7 % (ref 36.5–49.3)
HGB BLD-MCNC: 9.8 G/DL (ref 12–17)
INR PPP: 2.36 (ref 0.84–1.19)
MCH RBC QN AUTO: 33.6 PG (ref 26.8–34.3)
MCHC RBC AUTO-ENTMCNC: 33 G/DL (ref 31.4–37.4)
MCV RBC AUTO: 102 FL (ref 82–98)
PLATELET # BLD AUTO: 87 THOUSANDS/UL (ref 149–390)
PMV BLD AUTO: 9.4 FL (ref 8.9–12.7)
POTASSIUM SERPL-SCNC: 3.8 MMOL/L (ref 3.5–5.3)
PROT SERPL-MCNC: 4.6 G/DL (ref 6.4–8.4)
PROTHROMBIN TIME: 26.2 SECONDS (ref 11.6–14.5)
RBC # BLD AUTO: 2.92 MILLION/UL (ref 3.88–5.62)
SODIUM SERPL-SCNC: 132 MMOL/L (ref 135–147)
WBC # BLD AUTO: 5.95 THOUSAND/UL (ref 4.31–10.16)

## 2024-03-27 PROCEDURE — 94660 CPAP INITIATION&MGMT: CPT

## 2024-03-27 PROCEDURE — 94760 N-INVAS EAR/PLS OXIMETRY 1: CPT

## 2024-03-27 PROCEDURE — 80053 COMPREHEN METABOLIC PANEL: CPT

## 2024-03-27 PROCEDURE — 99232 SBSQ HOSP IP/OBS MODERATE 35: CPT

## 2024-03-27 PROCEDURE — 85610 PROTHROMBIN TIME: CPT

## 2024-03-27 PROCEDURE — 85027 COMPLETE CBC AUTOMATED: CPT

## 2024-03-27 RX ORDER — FUROSEMIDE 10 MG/ML
20 INJECTION INTRAMUSCULAR; INTRAVENOUS ONCE
Status: COMPLETED | OUTPATIENT
Start: 2024-03-27 | End: 2024-03-27

## 2024-03-27 RX ORDER — FUROSEMIDE 10 MG/ML
40 INJECTION INTRAMUSCULAR; INTRAVENOUS
Status: DISCONTINUED | OUTPATIENT
Start: 2024-03-27 | End: 2024-03-28

## 2024-03-27 RX ADMIN — FUROSEMIDE 20 MG: 10 INJECTION, SOLUTION INTRAMUSCULAR; INTRAVENOUS at 11:32

## 2024-03-27 RX ADMIN — FUROSEMIDE 40 MG: 10 INJECTION, SOLUTION INTRAMUSCULAR; INTRAVENOUS at 15:36

## 2024-03-27 RX ADMIN — PANTOPRAZOLE SODIUM 40 MG: 40 TABLET, DELAYED RELEASE ORAL at 05:02

## 2024-03-27 RX ADMIN — SPIRONOLACTONE 100 MG: 100 TABLET, FILM COATED ORAL at 08:04

## 2024-03-27 RX ADMIN — Medication 400 MG: at 08:04

## 2024-03-27 RX ADMIN — FLUOXETINE 20 MG: 20 CAPSULE ORAL at 08:04

## 2024-03-27 RX ADMIN — LACTULOSE 30 G: 10 SOLUTION ORAL at 08:04

## 2024-03-27 RX ADMIN — LAMOTRIGINE 25 MG: 25 TABLET ORAL at 08:04

## 2024-03-27 RX ADMIN — CARVEDILOL 6.25 MG: 6.25 TABLET, FILM COATED ORAL at 15:36

## 2024-03-27 RX ADMIN — RIFAXIMIN 550 MG: 550 TABLET ORAL at 08:04

## 2024-03-27 RX ADMIN — CARVEDILOL 6.25 MG: 6.25 TABLET, FILM COATED ORAL at 08:04

## 2024-03-27 RX ADMIN — FOLIC ACID 1 MG: 1 TABLET ORAL at 08:04

## 2024-03-27 RX ADMIN — HEPARIN SODIUM 5000 UNITS: 5000 INJECTION INTRAVENOUS; SUBCUTANEOUS at 14:00

## 2024-03-27 RX ADMIN — PHYTONADIONE 5 MG: 5 TABLET ORAL at 08:04

## 2024-03-27 RX ADMIN — HEPARIN SODIUM 5000 UNITS: 5000 INJECTION INTRAVENOUS; SUBCUTANEOUS at 05:02

## 2024-03-27 RX ADMIN — Medication 1 TABLET: at 08:04

## 2024-03-27 RX ADMIN — FUROSEMIDE 40 MG: 40 TABLET ORAL at 08:04

## 2024-03-27 RX ADMIN — THIAMINE HCL TAB 100 MG 100 MG: 100 TAB at 08:04

## 2024-03-27 RX ADMIN — HEPARIN SODIUM 5000 UNITS: 5000 INJECTION INTRAVENOUS; SUBCUTANEOUS at 21:27

## 2024-03-27 NOTE — ASSESSMENT & PLAN NOTE
Patient noncompliant with CPAP  May be contributing to patient's tiredness during the day   Will need CPAP ordered upon discharge  Should follow up for sleep study outpatient

## 2024-03-27 NOTE — CASE MANAGEMENT
Case Management Discharge Planning Note    Patient name Curt Dominguez  Location /-01 MRN [id number]  : 1970 Date 3/27/2024       Current Admission Date: 3/20/2024  Current Admission Diagnosis:Pleural effusion, right   Patient Active Problem List    Diagnosis Date Noted    Macrocytic anemia 2024    Hypokalemia 2024    Hypertension 2024    GERD (gastroesophageal reflux disease) 2024    Alcohol abuse 2024    Pleural effusion, right 2024    Alcoholic cirrhosis (HCC) 2024    Thrombocytopenia (HCC) 2024    SERGEI (obstructive sleep apnea) 2024    Elevated INR 2024    Serum total bilirubin elevated 2024    Ambulatory dysfunction 2023    Bipolar depression (HCC) 2014      LOS (days): 7  Geometric Mean LOS (GMLOS) (days):   Days to GMLOS:     OBJECTIVE:  Risk of Unplanned Readmission Score: 19.75         Current admission status: Inpatient   Preferred Pharmacy:   [facility] [address] [facility]  [address] [facility] [address]  Phone: [phone] Fax: [phone]    [facility]/pharmacy #[facility] [place], [place] - [address] [place] [address]  Phone: [phone] Fax: [phone]    Primary Care Provider: Leonela Oseguera MD    Primary Insurance: [employer] PEREIRA  Secondary Insurance:     DISCHARGE DETAILS:        CM coordinated with [facility] regarding discharge planning and patient's CPAP machine settings.    CM to follow patient's care and discharge needs.

## 2024-03-27 NOTE — PLAN OF CARE
Problem: PAIN - ADULT  Goal: Verbalizes/displays adequate comfort level or baseline comfort level  Description: Interventions:  - Encourage patient to monitor pain and request assistance  - Assess pain using appropriate pain scale  - Administer analgesics based on type and severity of pain and evaluate response  - Implement non-pharmacological measures as appropriate and evaluate response  - Consider cultural and social influences on pain and pain management  - Notify physician/advanced practitioner if interventions unsuccessful or patient reports new pain  Outcome: Progressing     Problem: INFECTION - ADULT  Goal: Absence or prevention of progression during hospitalization  Description: INTERVENTIONS:  - Assess and monitor for signs and symptoms of infection  - Monitor lab/diagnostic results  - Monitor all insertion sites, i.e. indwelling lines, tubes, and drains  - Monitor endotracheal if appropriate and nasal secretions for changes in amount and color  - [place] appropriate cooling/warming therapies per order  - Administer medications as ordered  - Instruct and encourage patient and family to use good hand hygiene technique  - Identify and instruct in appropriate isolation precautions for identified infection/condition  Outcome: Progressing  Goal: Absence of fever/infection during neutropenic period  Description: INTERVENTIONS:  - Monitor WBC    Outcome: Progressing     Problem: SAFETY ADULT  Goal: Patient will remain free of falls  Description: INTERVENTIONS:  - Educate patient/family on patient safety including physical limitations  - Instruct patient to call for assistance with activity   - Consult OT/PT to assist with strengthening/mobility   - Keep Call bell within reach  - Keep bed low and locked with side rails adjusted as appropriate  - Keep care items and personal belongings within reach  - Initiate and maintain comfort rounds  - Make Fall Risk Sign visible to staff  - Offer Toileting every 2 Hours,  in advance of need  - Initiate/Maintain bed alarm  - Obtain necessary fall risk management equipment: call bell in reach.alarm activated  - Apply yellow socks and bracelet for high fall risk patients  - Consider moving patient to room near nurses station  Outcome: Progressing  Goal: Maintain or return to baseline ADL function  Description: INTERVENTIONS:  -  Assess patient's ability to carry out ADLs; assess patient's baseline for ADL function and identify physical deficits which impact ability to perform ADLs (bathing, care of mouth/teeth, toileting, grooming, dressing, etc.)  - Assess/evaluate cause of self-care deficits   - Assess range of motion  - Assess patient's mobility; develop plan if impaired  - Assess patient's need for assistive devices and provide as appropriate  - Encourage maximum independence but intervene and supervise when necessary  - Involve family in performance of ADLs  - Assess for home care needs following discharge   - Consider OT consult to assist with ADL evaluation and planning for discharge  - Provide patient education as appropriate  Outcome: Progressing  Goal: Maintains/Returns to pre admission functional level  Description: INTERVENTIONS:  - Perform AM-PAC 6 Click Basic Mobility/ Daily Activity assessment daily.  - Set and communicate daily mobility goal to care team and patient/family/caregiver.   - Collaborate with rehabilitation services on mobility goals if consulted  - Perform Range of Motion 2 times a day.  - Reposition patient every 2 hours.  - Dangle patient 3 times a day  - Stand patient 3 times a day  - Ambulate patient 3 times a day  - Out of bed to chair 3 times a day   - Out of bed for meals 3 times a day  - Out of bed for toileting  - Record patient progress and toleration of activity level   Outcome: Progressing     Problem: DISCHARGE PLANNING  Goal: Discharge to home or other facility with appropriate resources  Description: INTERVENTIONS:  - Identify barriers to  discharge w/patient and caregiver  - Arrange for needed discharge resources and transportation as appropriate  - Identify discharge learning needs (meds, wound care, etc.)  - Arrange for interpretive services to assist at discharge as needed  - Refer to Case Management Department for coordinating discharge planning if the patient needs post-hospital services based on physician/advanced practitioner order or complex needs related to functional status, cognitive ability, or social support system  Outcome: Progressing     Problem: Knowledge Deficit  Goal: Patient/family/caregiver demonstrates understanding of disease process, treatment plan, medications, and discharge instructions  Description: Complete learning assessment and assess knowledge base.  Interventions:  - Provide teaching at level of understanding  - Provide teaching via preferred learning methods  Outcome: Progressing     Problem: Prexisting or High Potential for Compromised Skin Integrity  Goal: Skin integrity is maintained or improved  Description: INTERVENTIONS:  - Identify patients at risk for skin breakdown  - Assess and monitor skin integrity  - Assess and monitor nutrition and hydration status  - Monitor labs   - Assess for incontinence   - Turn and reposition patient  - Assist with mobility/ambulation  - Relieve pressure over bony prominences  - Avoid friction and shearing  - Provide appropriate hygiene as needed including keeping skin clean and dry  - Evaluate need for skin moisturizer/barrier cream  - Collaborate with interdisciplinary team   - Patient/family teaching  - Consider wound care consult   Outcome: Progressing     Problem: NEUROSENSORY - ADULT  Goal: Remains free of injury related to seizures activity  Description: INTERVENTIONS  - Maintain airway, patient safety  and administer oxygen as ordered  - Monitor patient for seizure activity, document and report duration and description of seizure to physician/advanced practitioner  - If  seizure occurs,  ensure patient safety during seizure  - Reorient patient post seizure  - Seizure pads on all 4 side rails  - Instruct patient/family to notify RN of any seizure activity including if an aura is experienced  - Instruct patient/family to call for assistance with activity based on nursing assessment  - Administer anti-seizure medications if ordered    Outcome: Progressing  Goal: Achieves maximal functionality and self care  Description: INTERVENTIONS  - Monitor swallowing and airway patency with patient fatigue and changes in neurological status  - Encourage and assist patient to increase activity and self care.   - Encourage visually impaired, hearing impaired and aphasic patients to use assistive/communication devices  Outcome: Progressing     Problem: GASTROINTESTINAL - ADULT  Goal: Maintains adequate nutritional intake  Description: INTERVENTIONS:  - Monitor percentage of each meal consumed  - Identify factors contributing to decreased intake, treat as appropriate  - Assist with meals as needed  - Monitor I&O, weight, and lab values if indicated  - Obtain nutrition services referral as needed  Outcome: Progressing     Problem: METABOLIC, FLUID AND ELECTROLYTES - ADULT  Goal: Electrolytes maintained within normal limits  Description: INTERVENTIONS:  - Monitor labs and assess patient for signs and symptoms of electrolyte imbalancesconfusion  - Administer electrolyte replacement as ordered  - Monitor response to electrolyte replacements, including repeat lab results as appropriate  - Instruct patient on fluid and nutrition as appropriate  Outcome: Progressing  Goal: Fluid balance maintained  Description: INTERVENTIONS:  - Monitor labs   - Monitor I/O and WT  - Instruct patient on fluid and nutrition as appropriate  - Assess for signs & symptoms of volume excess or deficit  Outcome: Progressing  Goal: Glucose maintained within target range  Description: INTERVENTIONS:  - Monitor Blood Glucose as  ordered  - Assess for signs and symptoms of hyperglycemia and hypoglycemia  - Administer ordered medications to maintain glucose within target range  - Assess nutritional intake and initiate nutrition service referral as needed  Outcome: Progressing

## 2024-03-27 NOTE — ASSESSMENT & PLAN NOTE
Appreciate critical care team input for pleural effusion management, additional fluid removed 3/24  Repeat CXR mildly improved from prior, awaiting final read  On room air

## 2024-03-27 NOTE — PLAN OF CARE
Problem: PAIN - ADULT  Goal: Verbalizes/displays adequate comfort level or baseline comfort level  Description: Interventions:  - Encourage patient to monitor pain and request assistance  - Assess pain using appropriate pain scale  - Administer analgesics based on type and severity of pain and evaluate response  - Implement non-pharmacological measures as appropriate and evaluate response  - Consider cultural and social influences on pain and pain management  - Notify physician/advanced practitioner if interventions unsuccessful or patient reports new pain  Outcome: Progressing     Problem: INFECTION - ADULT  Goal: Absence or prevention of progression during hospitalization  Description: INTERVENTIONS:  - Assess and monitor for signs and symptoms of infection  - Monitor lab/diagnostic results  - Monitor all insertion sites, i.e. indwelling lines, tubes, and drains  - Monitor endotracheal if appropriate and nasal secretions for changes in amount and color  - [place] appropriate cooling/warming therapies per order  - Administer medications as ordered  - Instruct and encourage patient and family to use good hand hygiene technique  - Identify and instruct in appropriate isolation precautions for identified infection/condition  Outcome: Progressing  Goal: Absence of fever/infection during neutropenic period  Description: INTERVENTIONS:  - Monitor WBC    Outcome: Progressing     Problem: SAFETY ADULT  Goal: Patient will remain free of falls  Description: INTERVENTIONS:  - Educate patient/family on patient safety including physical limitations  - Instruct patient to call for assistance with activity   - Consult OT/PT to assist with strengthening/mobility   - Keep Call bell within reach  - Keep bed low and locked with side rails adjusted as appropriate  - Keep care items and personal belongings within reach  - Initiate and maintain comfort rounds  - Make Fall Risk Sign visible to staff  - Offer Toileting every 2 Hours,  in advance of need  - Initiate/Maintain bed alarm  - Obtain necessary fall risk management equipment: call bell in reach.alarm activated  - Apply yellow socks and bracelet for high fall risk patients  - Consider moving patient to room near nurses station  Outcome: Progressing  Goal: Maintain or return to baseline ADL function  Description: INTERVENTIONS:  -  Assess patient's ability to carry out ADLs; assess patient's baseline for ADL function and identify physical deficits which impact ability to perform ADLs (bathing, care of mouth/teeth, toileting, grooming, dressing, etc.)  - Assess/evaluate cause of self-care deficits   - Assess range of motion  - Assess patient's mobility; develop plan if impaired  - Assess patient's need for assistive devices and provide as appropriate  - Encourage maximum independence but intervene and supervise when necessary  - Involve family in performance of ADLs  - Assess for home care needs following discharge   - Consider OT consult to assist with ADL evaluation and planning for discharge  - Provide patient education as appropriate  Outcome: Progressing  Goal: Maintains/Returns to pre admission functional level  Description: INTERVENTIONS:  - Perform AM-PAC 6 Click Basic Mobility/ Daily Activity assessment daily.  - Set and communicate daily mobility goal to care team and patient/family/caregiver.   - Collaborate with rehabilitation services on mobility goals if consulted  - Perform Range of Motion 2 times a day.  - Reposition patient every 2 hours.  - Dangle patient 3 times a day  - Stand patient 3 times a day  - Ambulate patient 3 times a day  - Out of bed to chair 3 times a day   - Out of bed for meals 3 times a day  - Out of bed for toileting  - Record patient progress and toleration of activity level   Outcome: Progressing     Problem: DISCHARGE PLANNING  Goal: Discharge to home or other facility with appropriate resources  Description: INTERVENTIONS:  - Identify barriers to  discharge w/patient and caregiver  - Arrange for needed discharge resources and transportation as appropriate  - Identify discharge learning needs (meds, wound care, etc.)  - Arrange for interpretive services to assist at discharge as needed  - Refer to Case Management Department for coordinating discharge planning if the patient needs post-hospital services based on physician/advanced practitioner order or complex needs related to functional status, cognitive ability, or social support system  Outcome: Progressing     Problem: Knowledge Deficit  Goal: Patient/family/caregiver demonstrates understanding of disease process, treatment plan, medications, and discharge instructions  Description: Complete learning assessment and assess knowledge base.  Interventions:  - Provide teaching at level of understanding  - Provide teaching via preferred learning methods  Outcome: Progressing     Problem: Prexisting or High Potential for Compromised Skin Integrity  Goal: Skin integrity is maintained or improved  Description: INTERVENTIONS:  - Identify patients at risk for skin breakdown  - Assess and monitor skin integrity  - Assess and monitor nutrition and hydration status  - Monitor labs   - Assess for incontinence   - Turn and reposition patient  - Assist with mobility/ambulation  - Relieve pressure over bony prominences  - Avoid friction and shearing  - Provide appropriate hygiene as needed including keeping skin clean and dry  - Evaluate need for skin moisturizer/barrier cream  - Collaborate with interdisciplinary team   - Patient/family teaching  - Consider wound care consult   Outcome: Progressing     Problem: NEUROSENSORY - ADULT  Goal: Remains free of injury related to seizures activity  Description: INTERVENTIONS  - Maintain airway, patient safety  and administer oxygen as ordered  - Monitor patient for seizure activity, document and report duration and description of seizure to physician/advanced practitioner  - If  seizure occurs,  ensure patient safety during seizure  - Reorient patient post seizure  - Seizure pads on all 4 side rails  - Instruct patient/family to notify RN of any seizure activity including if an aura is experienced  - Instruct patient/family to call for assistance with activity based on nursing assessment  - Administer anti-seizure medications if ordered    Outcome: Progressing  Goal: Achieves maximal functionality and self care  Description: INTERVENTIONS  - Monitor swallowing and airway patency with patient fatigue and changes in neurological status  - Encourage and assist patient to increase activity and self care.   - Encourage visually impaired, hearing impaired and aphasic patients to use assistive/communication devices  Outcome: Progressing     Problem: GASTROINTESTINAL - ADULT  Goal: Maintains adequate nutritional intake  Description: INTERVENTIONS:  - Monitor percentage of each meal consumed  - Identify factors contributing to decreased intake, treat as appropriate  - Assist with meals as needed  - Monitor I&O, weight, and lab values if indicated  - Obtain nutrition services referral as needed  Outcome: Progressing     Problem: METABOLIC, FLUID AND ELECTROLYTES - ADULT  Goal: Electrolytes maintained within normal limits  Description: INTERVENTIONS:  - Monitor labs and assess patient for signs and symptoms of electrolyte imbalancesconfusion  - Administer electrolyte replacement as ordered  - Monitor response to electrolyte replacements, including repeat lab results as appropriate  - Instruct patient on fluid and nutrition as appropriate  Outcome: Progressing  Goal: Fluid balance maintained  Description: INTERVENTIONS:  - Monitor labs   - Monitor I/O and WT  - Instruct patient on fluid and nutrition as appropriate  - Assess for signs & symptoms of volume excess or deficit  Outcome: Progressing  Goal: Glucose maintained within target range  Description: INTERVENTIONS:  - Monitor Blood Glucose as  ordered  - Assess for signs and symptoms of hyperglycemia and hypoglycemia  - Administer ordered medications to maintain glucose within target range  - Assess nutritional intake and initiate nutrition service referral as needed  Outcome: Progressing

## 2024-03-27 NOTE — PLAN OF CARE
Problem: PAIN - ADULT  Goal: Verbalizes/displays adequate comfort level or baseline comfort level  Description: Interventions:  - Encourage patient to monitor pain and request assistance  - Assess pain using appropriate pain scale  - Administer analgesics based on type and severity of pain and evaluate response  - Implement non-pharmacological measures as appropriate and evaluate response  - Consider cultural and social influences on pain and pain management  - Notify physician/advanced practitioner if interventions unsuccessful or patient reports new pain  Outcome: Progressing     Problem: INFECTION - ADULT  Goal: Absence or prevention of progression during hospitalization  Description: INTERVENTIONS:  - Assess and monitor for signs and symptoms of infection  - Monitor lab/diagnostic results  - Monitor all insertion sites, i.e. indwelling lines, tubes, and drains  - Monitor endotracheal if appropriate and nasal secretions for changes in amount and color  - [place] appropriate cooling/warming therapies per order  - Administer medications as ordered  - Instruct and encourage patient and family to use good hand hygiene technique  - Identify and instruct in appropriate isolation precautions for identified infection/condition  Outcome: Progressing  Goal: Absence of fever/infection during neutropenic period  Description: INTERVENTIONS:  - Monitor WBC    Outcome: Progressing     Problem: SAFETY ADULT  Goal: Patient will remain free of falls  Description: INTERVENTIONS:  - Educate patient/family on patient safety including physical limitations  - Instruct patient to call for assistance with activity   - Consult OT/PT to assist with strengthening/mobility   - Keep Call bell within reach  - Keep bed low and locked with side rails adjusted as appropriate  - Keep care items and personal belongings within reach  - Initiate and maintain comfort rounds  - Make Fall Risk Sign visible to staff  - Offer Toileting every 2 Hours,  in advance of need  - Initiate/Maintain bed alarm  - Obtain necessary fall risk management equipment: call bell in reach.alarm activated  - Apply yellow socks and bracelet for high fall risk patients  - Consider moving patient to room near nurses station  Outcome: Progressing  Goal: Maintain or return to baseline ADL function  Description: INTERVENTIONS:  -  Assess patient's ability to carry out ADLs; assess patient's baseline for ADL function and identify physical deficits which impact ability to perform ADLs (bathing, care of mouth/teeth, toileting, grooming, dressing, etc.)  - Assess/evaluate cause of self-care deficits   - Assess range of motion  - Assess patient's mobility; develop plan if impaired  - Assess patient's need for assistive devices and provide as appropriate  - Encourage maximum independence but intervene and supervise when necessary  - Involve family in performance of ADLs  - Assess for home care needs following discharge   - Consider OT consult to assist with ADL evaluation and planning for discharge  - Provide patient education as appropriate  Outcome: Progressing  Goal: Maintains/Returns to pre admission functional level  Description: INTERVENTIONS:  - Perform AM-PAC 6 Click Basic Mobility/ Daily Activity assessment daily.  - Set and communicate daily mobility goal to care team and patient/family/caregiver.   - Collaborate with rehabilitation services on mobility goals if consulted  - Perform Range of Motion 2 times a day.  - Reposition patient every 2 hours.  - Dangle patient 3 times a day  - Stand patient 3 times a day  - Ambulate patient 3 times a day  - Out of bed to chair 3 times a day   - Out of bed for meals 3 times a day  - Out of bed for toileting  - Record patient progress and toleration of activity level   Outcome: Progressing     Problem: DISCHARGE PLANNING  Goal: Discharge to home or other facility with appropriate resources  Description: INTERVENTIONS:  - Identify barriers to  discharge w/patient and caregiver  - Arrange for needed discharge resources and transportation as appropriate  - Identify discharge learning needs (meds, wound care, etc.)  - Arrange for interpretive services to assist at discharge as needed  - Refer to Case Management Department for coordinating discharge planning if the patient needs post-hospital services based on physician/advanced practitioner order or complex needs related to functional status, cognitive ability, or social support system  Outcome: Progressing     Problem: Knowledge Deficit  Goal: Patient/family/caregiver demonstrates understanding of disease process, treatment plan, medications, and discharge instructions  Description: Complete learning assessment and assess knowledge base.  Interventions:  - Provide teaching at level of understanding  - Provide teaching via preferred learning methods  Outcome: Progressing     Problem: Prexisting or High Potential for Compromised Skin Integrity  Goal: Skin integrity is maintained or improved  Description: INTERVENTIONS:  - Identify patients at risk for skin breakdown  - Assess and monitor skin integrity  - Assess and monitor nutrition and hydration status  - Monitor labs   - Assess for incontinence   - Turn and reposition patient  - Assist with mobility/ambulation  - Relieve pressure over bony prominences  - Avoid friction and shearing  - Provide appropriate hygiene as needed including keeping skin clean and dry  - Evaluate need for skin moisturizer/barrier cream  - Collaborate with interdisciplinary team   - Patient/family teaching  - Consider wound care consult   Outcome: Progressing     Problem: NEUROSENSORY - ADULT  Goal: Remains free of injury related to seizures activity  Description: INTERVENTIONS  - Maintain airway, patient safety  and administer oxygen as ordered  - Monitor patient for seizure activity, document and report duration and description of seizure to physician/advanced practitioner  - If  seizure occurs,  ensure patient safety during seizure  - Reorient patient post seizure  - Seizure pads on all 4 side rails  - Instruct patient/family to notify RN of any seizure activity including if an aura is experienced  - Instruct patient/family to call for assistance with activity based on nursing assessment  - Administer anti-seizure medications if ordered    Outcome: Progressing  Goal: Achieves maximal functionality and self care  Description: INTERVENTIONS  - Monitor swallowing and airway patency with patient fatigue and changes in neurological status  - Encourage and assist patient to increase activity and self care.   - Encourage visually impaired, hearing impaired and aphasic patients to use assistive/communication devices  Outcome: Progressing     Problem: GASTROINTESTINAL - ADULT  Goal: Maintains adequate nutritional intake  Description: INTERVENTIONS:  - Monitor percentage of each meal consumed  - Identify factors contributing to decreased intake, treat as appropriate  - Assist with meals as needed  - Monitor I&O, weight, and lab values if indicated  - Obtain nutrition services referral as needed  Outcome: Progressing     Problem: METABOLIC, FLUID AND ELECTROLYTES - ADULT  Goal: Electrolytes maintained within normal limits  Description: INTERVENTIONS:  - Monitor labs and assess patient for signs and symptoms of electrolyte imbalancesconfusion  - Administer electrolyte replacement as ordered  - Monitor response to electrolyte replacements, including repeat lab results as appropriate  - Instruct patient on fluid and nutrition as appropriate  Outcome: Progressing  Goal: Fluid balance maintained  Description: INTERVENTIONS:  - Monitor labs   - Monitor I/O and WT  - Instruct patient on fluid and nutrition as appropriate  - Assess for signs & symptoms of volume excess or deficit  Outcome: Progressing  Goal: Glucose maintained within target range  Description: INTERVENTIONS:  - Monitor Blood Glucose as  ordered  - Assess for signs and symptoms of hyperglycemia and hypoglycemia  - Administer ordered medications to maintain glucose within target range  - Assess nutritional intake and initiate nutrition service referral as needed  Outcome: Progressing

## 2024-03-27 NOTE — PROGRESS NOTES
[facility]  [facility]  Name: Curt Dominguez I  MRN: [id number]  Unit/Bed#: -Bran I Date of Admission: 3/20/2024   Date of Service: 3/27/2024 I Hospital Day: 7    Assessment/Plan   * Pleural effusion, right  Assessment & Plan  Appreciate critical care team input for pleural effusion management, additional fluid removed 3/24  Repeat CXR mildly improved from prior, awaiting final read  On room air    Alcoholic cirrhosis (HCC)  Assessment & Plan  Decompensated alcohol induced liver cirrhosis.  Suspected hepatic encephalopathy, continue lactulose, repeat ammonia level wnl  GI following, appreciate recs  Recommending Lasix 40 mg daily and spironolactone 100 mg daily  Patient continues to have 2+ pitting bilateral lower extremity edema -discussed with Dr. Monroy. Plan to give additional 20mg IV now on top of 40mg PO he had earlier this am and continue 40 mg IV twice daily until volume status improves.     MELD 3.0: 28 at 3/27/2024  5:23 AM  MELD-Na: 27 at 3/27/2024  5:23 AM  Calculated from:  Serum Creatinine: 0.93 mg/dL (Using min of 1 mg/dL) at 3/27/2024  5:23 AM  Serum Sodium: 132 mmol/L at 3/27/2024  5:23 AM  Total Bilirubin: 9.65 mg/dL at 3/27/2024  5:23 AM  Serum Albumin: 2.0 g/dL at 3/27/2024  5:23 AM  INR(ratio): 2.36 at 3/27/2024  5:23 AM  Age at listing (hypothetical): 53 years  Sex: Male at 3/27/2024  5:23 AM        GERD (gastroesophageal reflux disease)  Assessment & Plan  Continue PPI    Hypertension  Assessment & Plan  Does tend to run soft BP now with his cirrhosis  Continue Coreg, Lasix and Aldactone with hold parameters.    Hypokalemia  Assessment & Plan  Monitor potassium level daily and replace as needed      Macrocytic anemia  Assessment & Plan  Folate level low  Initiated on folic acid supplement    Elevated INR  Assessment & Plan  P.o. vitamin K started with improvement noted in INR  Continue to trend level    SERGEI (obstructive sleep apnea)  Assessment &  Plan  Patient noncompliant with CPAP  May be contributing to patient's tiredness during the day   Will need CPAP ordered upon discharge  Should follow up for sleep study outpatient     Bipolar depression (HCC)  Assessment & Plan  Continue Prozac    Ambulatory dysfunction  Assessment & Plan  Continue PT OT, patient recommended for short term rehab  He is agreeable to rehab so long as it lasts under 29 days  CM to assist    Thrombocytopenia (HCC)  Assessment & Plan  Secondary to cirrhosis  Platelet count trending up  Transfuse for plt <20k  Can maintain on VTE heparin    Alcohol abuse  Assessment & Plan  Chronic alcohol use, appreciate GI input.  Continue diuretics as blood pressure allows.  Now completed with librium taper  Monitor for signs of withdrawal   Alcohol cessation recommended, case management following  Patient wishes to attend IOP         VTE Pharmacologic Prophylaxis:   High Risk (Score >/= 5) - Pharmacological DVT Prophylaxis Ordered: heparin. Sequential Compression Devices Ordered.    Mobility:   Basic Mobility Inpatient Raw Score: 15  JH-HLM Goal: 4: Move to chair/commode  JH-HLM Achieved: 4: Move to chair/commode  JH-HLM Goal achieved. Continue to encourage appropriate mobility.    Patient Centered Rounds: I performed bedside rounds with nursing staff today.   Discussions with Specialists or Other Care Team Provider: GI    Education and Discussions with Family / Patient:  Will update friend.     Total Time Spent on Date of Encounter in care of patient: This time was spent on one or more of the following: performing physical exam; counseling and coordination of care; obtaining or reviewing history; documenting in the medical record; reviewing/ordering tests, medications or procedures; communicating with other healthcare professionals and discussing with patient's family/caregivers.    Current Length of Stay: 7 day(s)  Current Patient Status: Inpatient   Certification Statement: The patient will  continue to require additional inpatient hospital stay due to IV diuresis  Discharge Plan: Anticipate discharge in 48 hrs to rehab facility.    Code Status: Level 1 - Full Code    Subjective:   Seen and examined.  No acute events overnight.  States he is still feeling tired during the day.  Eating and drinking without difficulty.  No additional complaints at this time    Objective:     Vitals:   Temp (24hrs), Av.6 °F (36.4 °C), Min:97.5 °F (36.4 °C), Max:97.9 °F (36.6 °C)    Temp:  [97.5 °F (36.4 °C)-97.9 °F (36.6 °C)] 97.9 °F (36.6 °C)  HR:  [64-83] 79  Resp:  [17-20] 17  BP: (104-124)/(56-70) 121/63  SpO2:  [92 %-95 %] 94 %  Body mass index is 30.75 kg/m².     Input and Output Summary (last 24 hours):     Intake/Output Summary (Last 24 hours) at 3/27/2024 1312  Last data filed at 3/27/2024 1211  Gross per 24 hour   Intake 960 ml   Output 1600 ml   Net -640 ml       Physical Exam:   Physical Exam  Constitutional:       General: He is not in acute distress.     Appearance: He is ill-appearing.   HENT:      Mouth/Throat:      Mouth: Mucous membranes are moist.   Eyes:      General: Scleral icterus present.      Conjunctiva/sclera: Conjunctivae normal.   Cardiovascular:      Rate and Rhythm: Normal rate.      Heart sounds: No murmur heard.     No friction rub. No gallop.   Pulmonary:      Breath sounds: No wheezing, rhonchi or rales.   Abdominal:      General: There is distension.      Palpations: Abdomen is soft.      Tenderness: There is no abdominal tenderness.      Hernia: A hernia (umbilical) is present.   Musculoskeletal:      Right lower leg: Edema present.      Left lower leg: Edema present.      Comments: Bilateral 2+ pitting      Skin:     Coloration: Skin is jaundiced.   Neurological:      Mental Status: Mental status is at baseline.       Additional Data:     Labs:  Results from last 7 days   Lab Units 24  0523 24  0918 24  0445   WBC Thousand/uL 5.95   < > 6.45   HEMOGLOBIN g/dL 9.8*    < > 10.4*   HEMATOCRIT % 29.7*   < > 30.7*   PLATELETS Thousands/uL 87*   < > 73*   NEUTROS PCT %  --   --  46   LYMPHS PCT %  --   --  25   MONOS PCT %  --   --  19*   EOS PCT %  --   --  7*    < > = values in this interval not displayed.     Results from last 7 days   Lab Units 03/27/24  0523   SODIUM mmol/L 132*   POTASSIUM mmol/L 3.8   CHLORIDE mmol/L 98   CO2 mmol/L 30   BUN mg/dL 11   CREATININE mg/dL 0.93   ANION GAP mmol/L 4   CALCIUM mg/dL 7.9*   ALBUMIN g/dL 2.0*   TOTAL BILIRUBIN mg/dL 9.65*   ALK PHOS U/L 75   ALT U/L 11   AST U/L 30   GLUCOSE RANDOM mg/dL 153*     Results from last 7 days   Lab Units 03/27/24  0523   INR  2.36*             Results from last 7 days   Lab Units 03/25/24  0445 03/24/24  0609 03/21/24  0540 03/21/24  0455 03/20/24  1930 03/20/24  1725 03/20/24  1453 03/20/24  1355 03/20/24  1313   LACTIC ACID mmol/L  --   --  2.0  --  3.2* 3.2* 3.5*   < >  --    PROCALCITONIN ng/ml 0.11 0.10  --  0.09  --   --   --   --  0.09    < > = values in this interval not displayed.       Lines/Drains:  Invasive Devices       Peripheral Intravenous Line  Duration             Peripheral IV 03/24/24 Left;Upper;Ventral (anterior) Arm 3 days              Drain  Duration             External Urinary Catheter Small 6 days                          Imaging: Personally reviewed the following imaging: chest xray    Recent Cultures (last 7 days):   Results from last 7 days   Lab Units 03/20/24  1559 03/20/24  1339   BLOOD CULTURE   --  No Growth After 5 Days.  No Growth After 5 Days.   GRAM STAIN RESULT  No polys seen  No organisms seen  --    BODY FLUID CULTURE, STERILE  No growth  --        Last 24 Hours Medication List:   Current Facility-Administered Medications   Medication Dose Route Frequency Provider Last Rate    carvedilol  6.25 mg Oral BID With Meals Ilan Rosario DO      FLUoxetine  20 mg Oral Daily Ilan Rosario DO      folic acid  1 mg Oral Daily Ilan Rosario DO      furosemide  40 mg  Intravenous BID (diuretic) Brenda Galdamez PA-C      heparin (porcine)  5,000 Units Subcutaneous Q8H [facility] Ilan Rosario,       lactulose  30 g Oral Daily Ilan Rosario DO      lamoTRIgine  25 mg Oral Daily Ilan Rosario DO      magnesium Oxide  400 mg Oral Daily Ilan Rosario DO      multivitamin-minerals  1 tablet Oral Daily Ilan Rosario DO      pantoprazole  40 mg Oral Early Morning Ilan Rosario DO      phytonadione  5 mg Oral Daily Elba Menon PA-C      rifaximin  550 mg Oral Daily Ilan Rosario DO      spironolactone  100 mg Oral Daily Brenda Galdamez PA-C      thiamine  100 mg Oral Daily Ilan Rosario DO          Today, Patient Was Seen By: Brenda Galdamez PA-C    **Please Note: This note may have been constructed using a voice recognition system.**

## 2024-03-27 NOTE — ASSESSMENT & PLAN NOTE
Decompensated alcohol induced liver cirrhosis.  Suspected hepatic encephalopathy, continue lactulose, repeat ammonia level wnl  GI following, appreciate recs  Recommending Lasix 40 mg daily and spironolactone 100 mg daily  Patient continues to have 2+ pitting bilateral lower extremity edema -discussed with Dr. Monroy. Plan to give additional 20mg IV now on top of 40mg PO he had earlier this am and continue 40 mg IV twice daily until volume status improves.     MELD 3.0: 28 at 3/27/2024  5:23 AM  MELD-Na: 27 at 3/27/2024  5:23 AM  Calculated from:  Serum Creatinine: 0.93 mg/dL (Using min of 1 mg/dL) at 3/27/2024  5:23 AM  Serum Sodium: 132 mmol/L at 3/27/2024  5:23 AM  Total Bilirubin: 9.65 mg/dL at 3/27/2024  5:23 AM  Serum Albumin: 2.0 g/dL at 3/27/2024  5:23 AM  INR(ratio): 2.36 at 3/27/2024  5:23 AM  Age at listing (hypothetical): 53 years  Sex: Male at 3/27/2024  5:23 AM

## 2024-03-28 LAB
ALBUMIN SERPL BCP-MCNC: 1.9 G/DL (ref 3.5–5)
ALP SERPL-CCNC: 85 U/L (ref 34–104)
ALT SERPL W P-5'-P-CCNC: 10 U/L (ref 7–52)
ANION GAP SERPL CALCULATED.3IONS-SCNC: 3 MMOL/L (ref 4–13)
AST SERPL W P-5'-P-CCNC: 27 U/L (ref 13–39)
BILIRUB SERPL-MCNC: 8.69 MG/DL (ref 0.2–1)
BUN SERPL-MCNC: 11 MG/DL (ref 5–25)
CALCIUM ALBUM COR SERPL-MCNC: 9.6 MG/DL (ref 8.3–10.1)
CALCIUM SERPL-MCNC: 7.9 MG/DL (ref 8.4–10.2)
CHLORIDE SERPL-SCNC: 100 MMOL/L (ref 96–108)
CO2 SERPL-SCNC: 30 MMOL/L (ref 21–32)
CREAT SERPL-MCNC: 0.83 MG/DL (ref 0.6–1.3)
ERYTHROCYTE [DISTWIDTH] IN BLOOD BY AUTOMATED COUNT: 13.2 % (ref 11.6–15.1)
GFR SERPL CREATININE-BSD FRML MDRD: 101 ML/MIN/1.73SQ M
GLUCOSE SERPL-MCNC: 100 MG/DL (ref 65–140)
HCT VFR BLD AUTO: 29.4 % (ref 36.5–49.3)
HGB BLD-MCNC: 9.8 G/DL (ref 12–17)
MCH RBC QN AUTO: 34 PG (ref 26.8–34.3)
MCHC RBC AUTO-ENTMCNC: 33.3 G/DL (ref 31.4–37.4)
MCV RBC AUTO: 102 FL (ref 82–98)
PLATELET # BLD AUTO: 66 THOUSANDS/UL (ref 149–390)
PMV BLD AUTO: 9.6 FL (ref 8.9–12.7)
POTASSIUM SERPL-SCNC: 3.8 MMOL/L (ref 3.5–5.3)
PROT SERPL-MCNC: 4.6 G/DL (ref 6.4–8.4)
RBC # BLD AUTO: 2.88 MILLION/UL (ref 3.88–5.62)
SODIUM SERPL-SCNC: 133 MMOL/L (ref 135–147)
WBC # BLD AUTO: 6.26 THOUSAND/UL (ref 4.31–10.16)

## 2024-03-28 PROCEDURE — 99232 SBSQ HOSP IP/OBS MODERATE 35: CPT

## 2024-03-28 PROCEDURE — 97535 SELF CARE MNGMENT TRAINING: CPT

## 2024-03-28 PROCEDURE — 80053 COMPREHEN METABOLIC PANEL: CPT

## 2024-03-28 PROCEDURE — 94660 CPAP INITIATION&MGMT: CPT

## 2024-03-28 PROCEDURE — 85027 COMPLETE CBC AUTOMATED: CPT

## 2024-03-28 PROCEDURE — 94760 N-INVAS EAR/PLS OXIMETRY 1: CPT

## 2024-03-28 PROCEDURE — 97116 GAIT TRAINING THERAPY: CPT

## 2024-03-28 RX ORDER — FUROSEMIDE 10 MG/ML
40 INJECTION INTRAMUSCULAR; INTRAVENOUS DAILY
Status: DISCONTINUED | OUTPATIENT
Start: 2024-03-29 | End: 2024-03-28

## 2024-03-28 RX ORDER — FUROSEMIDE 40 MG/1
40 TABLET ORAL DAILY
Status: DISCONTINUED | OUTPATIENT
Start: 2024-03-28 | End: 2024-03-28

## 2024-03-28 RX ORDER — FUROSEMIDE 40 MG/1
40 TABLET ORAL DAILY
Status: DISCONTINUED | OUTPATIENT
Start: 2024-03-28 | End: 2024-03-29 | Stop reason: HOSPADM

## 2024-03-28 RX ADMIN — CARVEDILOL 6.25 MG: 6.25 TABLET, FILM COATED ORAL at 16:12

## 2024-03-28 RX ADMIN — FUROSEMIDE 40 MG: 40 TABLET ORAL at 13:55

## 2024-03-28 RX ADMIN — PHYTONADIONE 5 MG: 5 TABLET ORAL at 08:00

## 2024-03-28 RX ADMIN — SPIRONOLACTONE 100 MG: 100 TABLET, FILM COATED ORAL at 08:00

## 2024-03-28 RX ADMIN — FOLIC ACID 1 MG: 1 TABLET ORAL at 08:01

## 2024-03-28 RX ADMIN — RIFAXIMIN 550 MG: 550 TABLET ORAL at 08:00

## 2024-03-28 RX ADMIN — HEPARIN SODIUM 5000 UNITS: 5000 INJECTION INTRAVENOUS; SUBCUTANEOUS at 21:55

## 2024-03-28 RX ADMIN — HEPARIN SODIUM 5000 UNITS: 5000 INJECTION INTRAVENOUS; SUBCUTANEOUS at 05:13

## 2024-03-28 RX ADMIN — Medication 400 MG: at 08:01

## 2024-03-28 RX ADMIN — LACTULOSE 30 G: 10 SOLUTION ORAL at 08:01

## 2024-03-28 RX ADMIN — FLUOXETINE 20 MG: 20 CAPSULE ORAL at 08:01

## 2024-03-28 RX ADMIN — PANTOPRAZOLE SODIUM 40 MG: 40 TABLET, DELAYED RELEASE ORAL at 05:13

## 2024-03-28 RX ADMIN — LAMOTRIGINE 25 MG: 25 TABLET ORAL at 08:01

## 2024-03-28 RX ADMIN — THIAMINE HCL TAB 100 MG 100 MG: 100 TAB at 08:00

## 2024-03-28 RX ADMIN — Medication 1 TABLET: at 08:01

## 2024-03-28 RX ADMIN — HEPARIN SODIUM 5000 UNITS: 5000 INJECTION INTRAVENOUS; SUBCUTANEOUS at 13:55

## 2024-03-28 NOTE — UTILIZATION REVIEW
Continued Stay Review    Date:  3/28/24                         Current Patient Class:IP     Current Level of Care: MS    HPI:53 y.o. male initially admitted on  3/20/24    Assessment/Plan:   Continue treat right pleural effusion repeat CXR improved aeration of R lung and persistent small R effusion  Alcoholic cirrhosis  2+ pitting b/l lower extremity edema placed on IV Lasix 40mg bid yesterday.   MELD 3.0: 28 at 3/28/2024  5:03 AM  MELD-Na: 26 at 3/28/2024  5:03 AM   Vital Signs:   /28/24 1600 97.3 °F (36.3 °C) Abnormal  73 16 122/64 83 94 % None (Room air) -- Lying   03/28/24 12:08:16 97.3 °F (36.3 °C) Abnormal  -- -- -- 78 96 % -- -- --   03/28/24 1200 97.3 °F (36.3 °C) Abnormal  75 17 111/62 78 94 %        Pertinent Labs/Diagnostic Results:       Results from last 7 days   Lab Units 03/28/24  0503 03/27/24  0523 03/26/24 0918 03/25/24 0445 03/24/24  0609 03/23/24  0400 03/22/24  0347   WBC Thousand/uL 6.26 5.95 5.98 6.45 6.43   < > 6.79   HEMOGLOBIN g/dL 9.8* 9.8* 10.0* 10.4* 10.1*   < > 9.5*   HEMATOCRIT % 29.4* 29.7* 29.7* 30.7* 30.2*   < > 28.7*   PLATELETS Thousands/uL 66* 87* 76* 73* 81*   < > 65*   NEUTROS ABS Thousands/µL  --   --   --  2.99 3.09  --  3.79    < > = values in this interval not displayed.         Results from last 7 days   Lab Units 03/28/24  0503 03/27/24  0523 03/26/24 0918 03/25/24 0445 03/24/24  0609 03/23/24  0400   SODIUM mmol/L 133* 132* 132* 132*  --  130*   POTASSIUM mmol/L 3.8 3.8 3.9 4.0  --  3.9   CHLORIDE mmol/L 100 98 100 100  --  102   CO2 mmol/L 30 30 29 27  --  25   ANION GAP mmol/L 3* 4 3* 5  --  3*   BUN mg/dL 11 11 11 8  --  5   CREATININE mg/dL 0.83 0.93 0.95 0.84  --  0.73   EGFR ml/min/1.73sq m 101 94 92 101  --  107   CALCIUM mg/dL 7.9* 7.9* 7.9* 8.0*  --  7.3*   MAGNESIUM mg/dL  --   --   --  1.7* 1.7* 1.6*   PHOSPHORUS mg/dL  --   --   --  3.7 3.0  --      Results from last 7 days   Lab Units 03/28/24  0503 03/27/24  0523 03/26/24  0918 03/25/24  0445  "03/24/24  0950 03/23/24  0400 03/22/24  0347   AST U/L 27 30 30 31  --   --  57*   ALT U/L 10 11 12 12  --   --  17   ALK PHOS U/L 85 75 70 76  --   --  96   TOTAL PROTEIN g/dL 4.6* 4.6* 4.5* 4.5*  --   --  4.6*   ALBUMIN g/dL 1.9* 2.0* 2.1* 2.1*  --   --  1.8*   TOTAL BILIRUBIN mg/dL 8.69* 9.65* 10.50* 12.12*  --   --  11.45*   BILIRUBIN DIRECT mg/dL  --   --  4.97*  --   --  6.08*  --    AMMONIA umol/L  --   --   --   --  57  --   --          Results from last 7 days   Lab Units 03/28/24  0503 03/27/24  0523 03/26/24  0918 03/25/24  0445 03/23/24  0400 03/22/24  0347   GLUCOSE RANDOM mg/dL 100 153* 103 98 111 135             No results found for: \"BETA-HYDROXYBUTYRATE\"       Results from last 7 days   Lab Units 03/26/24  1349   PH PIYUSH  7.418*   PCO2 PIYUSH mm Hg 40.4*   PO2 PIYUSH mm Hg 45.0   HCO3 PIYUSH mmol/L 25.5   BASE EXC PIYUSH mmol/L 1.0   O2 CONTENT PIYUSH ml/dL 12.9   O2 HGB, VENOUS % 78.0                     Results from last 7 days   Lab Units 03/27/24  0523 03/26/24  0918 03/25/24  0445   PROTIME seconds 26.2* 28.1* 31.7*   INR  2.36* 2.59* 3.03*         Results from last 7 days   Lab Units 03/25/24  0445 03/24/24  0609   PROCALCITONIN ng/ml 0.11 0.10                     Results from last 7 days   Lab Units 03/22/24  0347   FERRITIN ng/mL 166   IRON ug/dL 71   TIBC ug/dL <126*         Medications:   Scheduled Medications:  carvedilol, 6.25 mg, Oral, BID With Meals  FLUoxetine, 20 mg, Oral, Daily  folic acid, 1 mg, Oral, Daily  furosemide, 40 mg, Oral, Daily  heparin (porcine), 5,000 Units, Subcutaneous, Q8H ADRIAN  lactulose, 30 g, Oral, Daily  lamoTRIgine, 25 mg, Oral, Daily  magnesium Oxide, 400 mg, Oral, Daily  multivitamin-minerals, 1 tablet, Oral, Daily  pantoprazole, 40 mg, Oral, Early Morning  phytonadione, 5 mg, Oral, Daily  rifaximin, 550 mg, Oral, Daily  spironolactone, 100 mg, Oral, Daily  thiamine, 100 mg, Oral, Daily      Continuous IV Infusions:     PRN Meds:       Discharge Plan: tbd    Network " Utilization Review Department  ATTENTION: Please call with any questions or concerns to [phone] and carefully listen to the prompts so that you are directed to the right person. All voicemails are confidential.   For Discharge needs, contact Care Management DC Support Team at [phone] opt. 2  Send all requests for admission clinical reviews, approved or denied determinations and any other requests to dedicated fax number below belonging to the campus where the patient is receiving treatment. List of dedicated fax numbers for the Facilities:  FACILITY NAME UR FAX NUMBER   ADMISSION DENIALS (Administrative/Medical Necessity) [phone]   DISCHARGE SUPPORT TEAM (NETWORK) [phone]   PARENT CHILD HEALTH (Maternity/NICU/Pediatrics) [phone]   [facility] [phone]   [facility] [phone]   [facility] [phone]   [facility] [phone]   [facility] [phone]   [facility] [phone]   [facility] [phone]   [facility] [phone]   [facility] [phone]   [facility] [phone]   [facility] [phone]   [facility] [phone]

## 2024-03-28 NOTE — ASSESSMENT & PLAN NOTE
Patient noncompliant with CPAP  May be contributing to patient's tiredness during the day   Will need CPAP ordered upon discharge, ordered  Should follow up for sleep study outpatient

## 2024-03-28 NOTE — ASSESSMENT & PLAN NOTE
Decompensated alcohol induced liver cirrhosis.  Suspected hepatic encephalopathy, continue lactulose, repeat ammonia level wnl  GI following, appreciate recs  Recommending Lasix 40 mg daily and spironolactone 100 mg daily  2+ pitting bilateral lower extremity edema on exam yesterday - discussed with Dr. Monroy recommending 40mg IV BID.   Significant improvement in LE edema from yesterday with IV lasix. Will transition to oral Lasix 40 mg daily and monitor.    MELD 3.0: 28 at 3/28/2024  5:03 AM  MELD-Na: 26 at 3/28/2024  5:03 AM  Calculated from:  Serum Creatinine: 0.83 mg/dL (Using min of 1 mg/dL) at 3/28/2024  5:03 AM  Serum Sodium: 133 mmol/L at 3/28/2024  5:03 AM  Total Bilirubin: 8.69 mg/dL at 3/28/2024  5:03 AM  Serum Albumin: 1.9 g/dL at 3/28/2024  5:03 AM  INR(ratio): 2.36 at 3/27/2024  5:23 AM  Age at listing (hypothetical): 53 years  Sex: Male at 3/28/2024  5:03 AM

## 2024-03-28 NOTE — ASSESSMENT & PLAN NOTE
Appreciate critical care team input for pleural effusion management, additional fluid removed 3/24  Repeat CXR mildly improved aeration of R lung and persistent small R effusion   On room air

## 2024-03-28 NOTE — PLAN OF CARE
Problem: PHYSICAL THERAPY ADULT  Goal: Performs mobility at highest level of function for planned discharge setting.  See evaluation for individualized goals.  Description: Treatment/Interventions: ADL retraining, Functional transfer training, LE strengthening/ROM, Elevations, Therapeutic exercise, Endurance training, Patient/family training, Equipment eval/education, Gait training, Bed mobility, Compensatory technique education, Spoke to nursing, Spoke to case management, OT  Equipment Recommended:  (Pt currently utilizing a RW during mobility)       See flowsheet documentation for full assessment, interventions and recommendations.  Outcome: Progressing  Note: Prognosis: Fair  Problem List: Decreased strength, Decreased endurance, Impaired balance, Decreased mobility, Decreased safety awareness, Obesity, Impaired sensation  Assessment: Pt tolerates tx session fairly.  Interventions consisting of transfer training, gait training, progressive gait training, and APs.  Pt increases ambulation distance and demonstrates improvement in gait quality using RW as compared to no AD.  SpO2 and HR remain WNL during exertion.  No c/o dizziness or SOB throughout.  Pt remains limited by standing balance deficits and endurance/activity tolerance impairment.  He will continue to benefit from acute care PT services as well as PT post D/C from acute care hospital to maximize functional independence and promote sustainable D/C.  Barriers to Discharge: Other (Comment)  Barriers to Discharge Comments: lives alone, fall risk  Rehab Resource Intensity Level, PT: II (Moderate Resource Intensity)    See flowsheet documentation for full assessment.

## 2024-03-28 NOTE — CASE MANAGEMENT
Case Management Discharge Planning Note    Patient name Curt Dominguez  Location /-01 MRN [id number]  : 1970 Date 3/28/2024       Current Admission Date: 3/20/2024  Current Admission Diagnosis:Pleural effusion, right   Patient Active Problem List    Diagnosis Date Noted    Macrocytic anemia 2024    Hypokalemia 2024    Hypertension 2024    GERD (gastroesophageal reflux disease) 2024    Alcohol abuse 2024    Pleural effusion, right 2024    Alcoholic cirrhosis (HCC) 2024    Thrombocytopenia (HCC) 2024    SERGEI (obstructive sleep apnea) 2024    Elevated INR 2024    Serum total bilirubin elevated 2024    Ambulatory dysfunction 2023    Bipolar depression (HCC) 2014      LOS (days): 8  Geometric Mean LOS (GMLOS) (days):   Days to GMLOS:     OBJECTIVE:  Risk of Unplanned Readmission Score: 18.21         Current admission status: Inpatient   [place]   [facility] [place] [facility]  [address] [facility] [address]  Phone: [phone] Fax: [phone]    [facility]/pharmacy #[facility] [place], [place] - [address] [place] [address]  Phone: [phone] Fax: [phone]    Primary Care Provider: Leonela Oseguera MD    Primary Insurance: [place] [employer] [facility] ARLETH PEREIRA  Secondary Insurance:     DISCHARGE DETAILS:        CM coordinated with [facility] regarding patient's discharge planning in [facility]. CM uploaded updated Cpap script. Cm discussed anticipated discharge for tomorrow.     CM requested new authorization for patient.     Cm to follow patient's care and discharge needs.

## 2024-03-28 NOTE — PHYSICAL THERAPY NOTE
PHYSICAL THERAPY TREATMENT NOTE      NAME:  Curt Dominguez  DATE: 03/28/24    Length Of Stay: 8  Performed at least 2 patient identifiers during session: Name and Birthday    TREATMENT FLOW SHEET:      03/28/24 1054   PT Last Visit   PT Visit Date 03/28/24   Note Type   Note Type Treatment for insurance authorization   Pain Assessment   Pain Assessment Tool 0-10   Pain Score No Pain   Restrictions/Precautions   Weight Bearing Precautions Per Order No   Other Precautions Chair Alarm;Bed Alarm;Fall Risk  (fluid restriction)   General   Chart Reviewed Yes   Family/Caregiver Present Yes  (friend)   Cognition   Overall Cognitive Status WFL   Arousal/Participation Cooperative   Attention Within functional limits   Following Commands Follows one step commands without difficulty   Bed Mobility   Supine to Sit 5  Supervision   Additional items HOB elevated;Increased time required   Transfers   Sit to Stand   (SBA)   Stand to Sit   (SBA)   Stand pivot   (steadying assist)   Additional items Assist x 1;Increased time required;Verbal cues   Additional Comments No AD vs. RW (similar assistance level with or without device).  Gait quality improves with RW --> inc step length/height   Ambulation/Elevation   Gait pattern Improper Weight shift;Wide ELIS;Decreased foot clearance;Inconsistent nasra;Decreased hip extension;Decreased heel strike;Decreased toe off   Gait Assistance   (steadying assist)   Additional items Assist x 1;Verbal cues   Assistive Device   (no AD vs. RW)   Distance 10 ft x 2 without AD; 59 ft x 1 using RW   Balance   Static Sitting Good   Dynamic Sitting Fair +   Static Standing Fair -   Dynamic Standing Fair -   Ambulatory Fair -   Endurance Deficit   Endurance Deficit Yes   Activity Tolerance   Activity Tolerance Patient limited by fatigue   Medical Staff Made Aware LEIGHTON Currie   Nurse Made Aware FREDERICK Aguilera   Exercises   Ankle Pumps Sitting;20 reps;AROM;Bilateral   Assessment   Prognosis Fair   Problem List  Decreased strength;Decreased endurance;Impaired balance;Decreased mobility;Decreased safety awareness;Obesity;Impaired sensation   Assessment Pt tolerates tx session fairly.  Interventions consisting of transfer training, gait training, progressive gait training, and APs.  Pt increases ambulation distance and demonstrates improvement in gait quality using RW as compared to no AD.  SpO2 and HR remain WNL during exertion.  No c/o dizziness or SOB throughout.  Pt remains limited by standing balance deficits and endurance/activity tolerance impairment.  He will continue to benefit from acute care PT services as well as PT post D/C from acute [facility] hospital to maximize functional independence and promote sustainable D/C.   Barriers to Discharge Other (Comment)   Barriers to Discharge Comments lives alone, fall risk   Goals   STG Expiration Date 04/08/24   PT Treatment Day 4   Plan   Progress Slow progress, decreased activity tolerance   PT Frequency 3-5x/wk   Discharge Recommendation   Rehab Resource Intensity Level, PT II (Moderate Resource Intensity)   AM-PAC Basic Mobility Inpatient   Turning in Flat Bed Without Bedrails 3   Lying on Back to Sitting on Edge of Flat Bed Without Bedrails 3   Moving Bed to Chair 3   Standing Up From Chair Using Arms 3   Walk in Room 3   Climb 3-5 Stairs With Railing 3   Basic Mobility Inpatient Raw Score 18   Basic Mobility Standardized Score 41.05   [facility] Highest Level Of Mobility   -HLM Goal 6: Walk 10 steps or more   -HLM Achieved 7: Walk 25 feet or more   Education   Education Provided Mobility training;Assistive device   Patient Demonstrates verbal understanding   End of Consult   Patient Position at End of Consult Bedside chair;Bed/Chair alarm activated;All needs within reach       The patient's AM-PAC Basic Mobility Inpatient Short Form Raw Score is 18. A Raw score of greater than 16 suggests the patient may benefit from discharge to home. Please also refer to the  recommendation of the Physical Therapist for safe discharge planning.         Giovanni Taylor, PT,DPT

## 2024-03-28 NOTE — PROGRESS NOTES
[facility]  Progress Note  Name: Curt Dominguez I  MRN: [id number]  Unit/Bed#: -Bran I Date of Admission: 3/20/2024   Date of Service: 3/28/2024 I Hospital Day: 8    Assessment/Plan   * Pleural effusion, right  Assessment & Plan  Appreciate critical care team input for pleural effusion management, additional fluid removed 3/24  Repeat CXR mildly improved aeration of R lung and persistent small R effusion   On room air    Alcoholic cirrhosis (HCC)  Assessment & Plan  Decompensated alcohol induced liver cirrhosis.  Suspected hepatic encephalopathy, continue lactulose, repeat ammonia level wnl  GI following, appreciate recs  Recommending Lasix 40 mg daily and spironolactone 100 mg daily  2+ pitting bilateral lower extremity edema on exam yesterday - discussed with Dr. Monroy recommending 40mg IV BID.   Significant improvement in LE edema from yesterday with IV lasix. Will transition to oral Lasix 40 mg daily and monitor.    MELD 3.0: 28 at 3/28/2024  5:03 AM  MELD-Na: 26 at 3/28/2024  5:03 AM  Calculated from:  Serum Creatinine: 0.83 mg/dL (Using min of 1 mg/dL) at 3/28/2024  5:03 AM  Serum Sodium: 133 mmol/L at 3/28/2024  5:03 AM  Total Bilirubin: 8.69 mg/dL at 3/28/2024  5:03 AM  Serum Albumin: 1.9 g/dL at 3/28/2024  5:03 AM  INR(ratio): 2.36 at 3/27/2024  5:23 AM  Age at listing (hypothetical): 53 years  Sex: Male at 3/28/2024  5:03 AM        GERD (gastroesophageal reflux disease)  Assessment & Plan  Continue PPI    Hypertension  Assessment & Plan  Does tend to run soft BP now with his cirrhosis  Continue Coreg, Lasix and Aldactone with hold parameters.    Hypokalemia  Assessment & Plan  Monitor potassium level daily and replace as needed      Macrocytic anemia  Assessment & Plan  Folate level low  Initiated on folic acid supplement    Elevated INR  Assessment & Plan  P.o. vitamin K started with improvement noted in INR  Continue to trend level    SERGEI (obstructive sleep  apnea)  Assessment & Plan  Patient noncompliant with CPAP  May be contributing to patient's tiredness during the day   Will need CPAP ordered upon discharge, ordered  Should follow up for sleep study outpatient     Bipolar depression (HCC)  Assessment & Plan  Continue Prozac    Ambulatory dysfunction  Assessment & Plan  Continue PT OT, patient recommended for short term rehab  He is agreeable to rehab so long as it lasts under 29 days  CM to assist    Thrombocytopenia (HCC)  Assessment & Plan  Secondary to cirrhosis  Platelet count trending up  Transfuse for plt <20k  Can maintain on VTE heparin    Alcohol abuse  Assessment & Plan  Chronic alcohol use, appreciate GI input.  Continue diuretics as blood pressure allows.  Now completed with librium taper  Monitor for signs of withdrawal   Alcohol cessation recommended, case management following  Patient wishes to attend [facility]         VTE Pharmacologic Prophylaxis:   High Risk (Score >/= 5) - Pharmacological DVT Prophylaxis Ordered: heparin. Sequential Compression Devices Ordered.    Mobility:   Basic Mobility Inpatient Raw Score: 18  JH-HLM Goal: 6: Walk 10 steps or more  JH-HLM Achieved: 7: Walk 25 feet or more  JH-HLM Goal achieved. Continue to encourage appropriate mobility.    Patient Centered Rounds: I performed bedside rounds with nursing staff today.   Discussions with Specialists or Other Care Team Provider: None    Education and Discussions with Family / Patient: Patient declined call to .     Total Time Spent on Date of Encounter in care of patient: This time was spent on one or more of the following: performing physical exam; counseling and coordination of care; obtaining or reviewing history; documenting in the medical record; reviewing/ordering tests, medications or procedures; communicating with other healthcare professionals and discussing with patient's family/caregivers.    Current Length of Stay: 8 day(s)  Current Patient Status: Inpatient    Certification Statement: The patient will continue to require additional inpatient hospital stay due to IV diuresis   Discharge Plan: Anticipate discharge tomorrow to rehab facility.    Code Status: Level 1 - Full Code    Subjective:   Seen and examined.  No acute events overnight.  States the swelling is much improved.  No complaints at this time    Objective:     Vitals:   Temp (24hrs), Av.6 °F (36.4 °C), Min:97.3 °F (36.3 °C), Max:97.9 °F (36.6 °C)    Temp:  [97.3 °F (36.3 °C)-97.9 °F (36.6 °C)] 97.3 °F (36.3 °C)  HR:  [75-81] 75  Resp:  [17-18] 17  BP: (103-123)/(61-64) 111/62  SpO2:  [94 %-96 %] 96 %  Body mass index is 39.4 kg/m².     Input and Output Summary (last 24 hours):     Intake/Output Summary (Last 24 hours) at 3/28/2024 1351  Last data filed at 3/28/2024 1300  Gross per 24 hour   Intake 780 ml   Output --   Net 780 ml       Physical Exam:   Physical Exam  Constitutional:       General: He is not in acute distress.     Appearance: He is ill-appearing. He is not toxic-appearing.   HENT:      Mouth/Throat:      Mouth: Mucous membranes are moist.   Eyes:      General: Scleral icterus present.   Cardiovascular:      Rate and Rhythm: Normal rate.      Heart sounds: No murmur heard.     No friction rub. No gallop.   Pulmonary:      Breath sounds: No wheezing, rhonchi or rales.   Abdominal:      Palpations: Abdomen is soft.      Hernia: A hernia (umbilical) is present.   Skin:     Coloration: Skin is jaundiced.      Findings: Bruising (abdomen where heparin placed) present.          Additional Data:     Labs:  Results from last 7 days   Lab Units 24  0503 24  0918 24  0445   WBC Thousand/uL 6.26   < > 6.45   HEMOGLOBIN g/dL 9.8*   < > 10.4*   HEMATOCRIT % 29.4*   < > 30.7*   PLATELETS Thousands/uL 66*   < > 73*   NEUTROS PCT %  --   --  46   LYMPHS PCT %  --   --  25   MONOS PCT %  --   --  19*   EOS PCT %  --   --  7*    < > = values in this interval not displayed.     Results from  last 7 days   Lab Units 03/28/24  0503   SODIUM mmol/L 133*   POTASSIUM mmol/L 3.8   CHLORIDE mmol/L 100   CO2 mmol/L 30   BUN mg/dL 11   CREATININE mg/dL 0.83   ANION GAP mmol/L 3*   CALCIUM mg/dL 7.9*   ALBUMIN g/dL 1.9*   TOTAL BILIRUBIN mg/dL 8.69*   ALK PHOS U/L 85   ALT U/L 10   AST U/L 27   GLUCOSE RANDOM mg/dL 100     Results from last 7 days   Lab Units 03/27/24  0523   INR  2.36*             Results from last 7 days   Lab Units 03/25/24  0445 03/24/24  0609   PROCALCITONIN ng/ml 0.11 0.10       Lines/Drains:  Invasive Devices       Peripheral Intravenous Line  Duration             Peripheral IV 03/24/24 Left;Upper;Ventral (anterior) Arm 4 days              Drain  Duration             External Urinary Catheter Small 7 days                          Imaging: No pertinent imaging reviewed.    Recent Cultures (last 7 days):         Last 24 Hours Medication List:   Current Facility-Administered Medications   Medication Dose Route Frequency Provider Last Rate    carvedilol  6.25 mg Oral BID With Meals Ilan Rosario DO      FLUoxetine  20 mg Oral Daily Ilan Rosario DO      folic acid  1 mg Oral Daily Ilan Rosario DO      furosemide  40 mg Oral Daily Brenda Galdamez PA-C      heparin (porcine)  5,000 Units Subcutaneous Q8H [facility] Ilan Rosario DO      lactulose  30 g Oral Daily Ilan Rosario DO      lamoTRIgine  25 mg Oral Daily Ilan Rosario DO      magnesium Oxide  400 mg Oral Daily Ilan Rosario DO      multivitamin-minerals  1 tablet Oral Daily Ilan Rosario DO      pantoprazole  40 mg Oral Early Morning Ilan Rosario DO      phytonadione  5 mg Oral Daily Elba Menon PA-C      rifaximin  550 mg Oral Daily Ilan Rosario DO      spironolactone  100 mg Oral Daily Brenda Galdamez PA-C      thiamine  100 mg Oral Daily Ilan Rosario DO          Today, Patient Was Seen By: Brenda Galdamez PA-C    **Please Note: This note may have been constructed using a voice recognition  system.**

## 2024-03-28 NOTE — CASE MANAGEMENT
Received SNF from . Updated clinical faxed to insurance to update unused approved auth. F#: [phone]. CM notified.     Authorization #: [id number]   Facility Name: [facility] NPI: [id number]  Facility MD: Dr. Augustine NPI: [id number]

## 2024-03-28 NOTE — PLAN OF CARE
Problem: PAIN - ADULT  Goal: Verbalizes/displays adequate comfort level or baseline comfort level  Description: Interventions:  - Encourage patient to monitor pain and request assistance  - Assess pain using appropriate pain scale  - Administer analgesics based on type and severity of pain and evaluate response  - Implement non-pharmacological measures as appropriate and evaluate response  - Consider cultural and social influences on pain and pain management  - Notify physician/advanced practitioner if interventions unsuccessful or patient reports new pain  Outcome: Progressing     Problem: INFECTION - ADULT  Goal: Absence or prevention of progression during hospitalization  Description: INTERVENTIONS:  - Assess and monitor for signs and symptoms of infection  - Monitor lab/diagnostic results  - Monitor all insertion sites, i.e. indwelling lines, tubes, and drains  - Monitor endotracheal if appropriate and nasal secretions for changes in amount and color  - Jacob appropriate cooling/warming therapies per order  - Administer medications as ordered  - Instruct and encourage patient and family to use good hand hygiene technique  - Identify and instruct in appropriate isolation precautions for identified infection/condition  Outcome: Progressing  Goal: Absence of fever/infection during neutropenic period  Description: INTERVENTIONS:  - Monitor WBC    Outcome: Progressing     Problem: SAFETY ADULT  Goal: Patient will remain free of falls  Description: INTERVENTIONS:  - Educate patient/family on patient safety including physical limitations  - Instruct patient to call for assistance with activity   - Consult OT/PT to assist with strengthening/mobility   - Keep Call bell within reach  - Keep bed low and locked with side rails adjusted as appropriate  - Keep care items and personal belongings within reach  - Initiate and maintain comfort rounds  - Make Fall Risk Sign visible to staff  - Offer Toileting every 2 Hours,  in advance of need  - Initiate/Maintain bed alarm  - Obtain necessary fall risk management equipment: call bell in reach.alarm activated  - Apply yellow socks and bracelet for high fall risk patients  - Consider moving patient to room near nurses station  Outcome: Progressing  Goal: Maintain or return to baseline ADL function  Description: INTERVENTIONS:  -  Assess patient's ability to carry out ADLs; assess patient's baseline for ADL function and identify physical deficits which impact ability to perform ADLs (bathing, care of mouth/teeth, toileting, grooming, dressing, etc.)  - Assess/evaluate cause of self-care deficits   - Assess range of motion  - Assess patient's mobility; develop plan if impaired  - Assess patient's need for assistive devices and provide as appropriate  - Encourage maximum independence but intervene and supervise when necessary  - Involve family in performance of ADLs  - Assess for home care needs following discharge   - Consider OT consult to assist with ADL evaluation and planning for discharge  - Provide patient education as appropriate  Outcome: Progressing  Goal: Maintains/Returns to pre admission functional level  Description: INTERVENTIONS:  - Perform AM-PAC 6 Click Basic Mobility/ Daily Activity assessment daily.  - Set and communicate daily mobility goal to care team and patient/family/caregiver.   - Collaborate with rehabilitation services on mobility goals if consulted  - Perform Range of Motion 2 times a day.  - Reposition patient every 2 hours.  - Dangle patient 3 times a day  - Stand patient 3 times a day  - Ambulate patient 3 times a day  - Out of bed to chair 3 times a day   - Out of bed for meals 3 times a day  - Out of bed for toileting  - Record patient progress and toleration of activity level   Outcome: Progressing     Problem: DISCHARGE PLANNING  Goal: Discharge to home or other facility with appropriate resources  Description: INTERVENTIONS:  - Identify barriers to  discharge w/patient and caregiver  - Arrange for needed discharge resources and transportation as appropriate  - Identify discharge learning needs (meds, wound care, etc.)  - Arrange for interpretive services to assist at discharge as needed  - Refer to Case Management Department for coordinating discharge planning if the patient needs post-hospital services based on physician/advanced practitioner order or complex needs related to functional status, cognitive ability, or social support system  Outcome: Progressing     Problem: Knowledge Deficit  Goal: Patient/family/caregiver demonstrates understanding of disease process, treatment plan, medications, and discharge instructions  Description: Complete learning assessment and assess knowledge base.  Interventions:  - Provide teaching at level of understanding  - Provide teaching via preferred learning methods  Outcome: Progressing     Problem: Prexisting or High Potential for Compromised Skin Integrity  Goal: Skin integrity is maintained or improved  Description: INTERVENTIONS:  - Identify patients at risk for skin breakdown  - Assess and monitor skin integrity  - Assess and monitor nutrition and hydration status  - Monitor labs   - Assess for incontinence   - Turn and reposition patient  - Assist with mobility/ambulation  - Relieve pressure over bony prominences  - Avoid friction and shearing  - Provide appropriate hygiene as needed including keeping skin clean and dry  - Evaluate need for skin moisturizer/barrier cream  - Collaborate with interdisciplinary team   - Patient/family teaching  - Consider wound care consult   Outcome: Progressing     Problem: NEUROSENSORY - ADULT  Goal: Remains free of injury related to seizures activity  Description: INTERVENTIONS  - Maintain airway, patient safety  and administer oxygen as ordered  - Monitor patient for seizure activity, document and report duration and description of seizure to physician/advanced practitioner  - If  seizure occurs,  ensure patient safety during seizure  - Reorient patient post seizure  - Seizure pads on all 4 side rails  - Instruct patient/family to notify RN of any seizure activity including if an aura is experienced  - Instruct patient/family to call for assistance with activity based on nursing assessment  - Administer anti-seizure medications if ordered    Outcome: Progressing  Goal: Achieves maximal functionality and self care  Description: INTERVENTIONS  - Monitor swallowing and airway patency with patient fatigue and changes in neurological status  - Encourage and assist patient to increase activity and self care.   - Encourage visually impaired, hearing impaired and aphasic patients to use assistive/communication devices  Outcome: Progressing     Problem: GASTROINTESTINAL - ADULT  Goal: Maintains adequate nutritional intake  Description: INTERVENTIONS:  - Monitor percentage of each meal consumed  - Identify factors contributing to decreased intake, treat as appropriate  - Assist with meals as needed  - Monitor I&O, weight, and lab values if indicated  - Obtain nutrition services referral as needed  Outcome: Progressing     Problem: METABOLIC, FLUID AND ELECTROLYTES - ADULT  Goal: Electrolytes maintained within normal limits  Description: INTERVENTIONS:  - Monitor labs and assess patient for signs and symptoms of electrolyte imbalancesconfusion  - Administer electrolyte replacement as ordered  - Monitor response to electrolyte replacements, including repeat lab results as appropriate  - Instruct patient on fluid and nutrition as appropriate  Outcome: Progressing  Goal: Fluid balance maintained  Description: INTERVENTIONS:  - Monitor labs   - Monitor I/O and WT  - Instruct patient on fluid and nutrition as appropriate  - Assess for signs & symptoms of volume excess or deficit  Outcome: Progressing  Goal: Glucose maintained within target range  Description: INTERVENTIONS:  - Monitor Blood Glucose as  ordered  - Assess for signs and symptoms of hyperglycemia and hypoglycemia  - Administer ordered medications to maintain glucose within target range  - Assess nutritional intake and initiate nutrition service referral as needed  Outcome: Progressing

## 2024-03-28 NOTE — PLAN OF CARE
Problem: OCCUPATIONAL THERAPY ADULT  Goal: Performs self-care activities at highest level of function for planned discharge setting.  See evaluation for individualized goals.  Description: Treatment Interventions: ADL retraining, Functional transfer training, UE strengthening/ROM, Endurance training, Patient/family training, Equipment evaluation/education, Compensatory technique education, Continued evaluation, Energy conservation, Activityengagement          See flowsheet documentation for full assessment, interventions and recommendations.   Outcome: Progressing  Note: Limitation: Decreased ADL status, Decreased UE strength, Decreased Safe judgement during ADL, Decreased endurance, Decreased self-care trans, Decreased high-level ADLs  Prognosis: Fair  Assessment: Pt completed OT tx session #4 focused on ADLs and functional mobility. Pt alert and agreeable to participate. PT/OT co-treat completed d/t significant mobility deficits and safety concerns. Pt demonstrated improvements in functional transfers/functional mobility this session but continues to be limited by fatigue, generalized weakness. Pt currently completing UB ADLs @ supervision/setup, LB ADLs @ supervision/setup, steadying assistance for any ADL in standing, and functional mobility with RW @ steadying assistance. Pt demonstrating G participation and G potential to achieve goals but is currently demonstrating deficits in decrease activity tolerance, decrease standing balance, decrease performance during ADL tasks, decrease UB MS, decrease generalized strength, decrease activity engagement, decrease performance during functional transfers, limited family support, high fall risk, and limited insight to deficits. Continue to recommend Level II (Moderate Resource Intensity) upon discharge to increase safety and independence in ADL tasks and functional mobility. At end of session, pt seated in chair with chair alarm on, call bell in reach, and all needs  met.     Rehab Resource Intensity Level, OT: II (Moderate Resource Intensity)

## 2024-03-28 NOTE — OCCUPATIONAL THERAPY NOTE
Occupational Therapy Progress Note     Patient Name: Curt Dominguez  Today's Date: 3/28/2024  Problem List  Principal Problem:    Pleural effusion, right  Active Problems:    Alcohol abuse    Alcoholic cirrhosis (HCC)    Thrombocytopenia (HCC)    Ambulatory dysfunction    Bipolar depression (HCC)    SERGEI (obstructive sleep apnea)    Elevated INR    Macrocytic anemia    Hypokalemia    Hypertension    GERD (gastroesophageal reflux disease)     03/28/24 1055   Note Type   Note Type Treatment for insurance authorization   Pain Assessment   Pain Assessment Tool 0-10   Pain Score No Pain   Restrictions/Precautions   Other Precautions Chair Alarm;Bed Alarm;Fall Risk;Fluid restriction   ADL   Grooming Assistance   (Steadying assist)   Grooming Deficit Standing with assistive device;Wash/dry hands   Grooming Comments Pt washed/dried B hands while standing at sink with steadying assistance.   UB Dressing Assistance 5  Supervision/Setup   UB Dressing Deficit Increased time to complete;Supervision/safety;Thread RUE;Thread LUE   UB Dressing Comments Pt able to don new gown with setup.   LB Dressing Assistance   (Supervision-->steadying assistance)   LB Dressing Deficit Thread RLE into underwear;Thread LLE into underwear;Pull up over hips   LB Dressing Comments Pt able to thread underwear through BLEs in seated with supervision/setup, and steadying assistance for pulling up over hips in standing.   Toileting Assistance    (Steadying assist)   Toileting Deficit Perineal hygiene   Toileting Comments Pt participated in pericare in standing with steadying assistance.   Bed Mobility   Supine to Sit 5  Supervision   Additional items HOB elevated;Increased time required   Additional Comments Pt received supine in bed upon start of session. Pt supine > sit EOB with supervision and increased time.   Transfers   Sit to Stand   (SBA)   Additional items Impulsive;Increased time required;Verbal cues   Stand to Sit   (Steadying assist)    Additional items Assist x 1;Increased time required;Verbal cues   Stand pivot   (Steadying assist)   Additional items Assist x 1;Increased time required;Verbal cues   Additional Comments Functional transfers initially without AD. Pt sit > stand with SBA. Pt spt in room with steadying assistance. With use of RW, pt remaining at steadying assist level.   Functional Mobility   Functional Mobility   (Steadying assist)   Additional Comments Pt participated in short functional distance, walking to bathroom, with steadying assistance and no AD. Then trialled functional mobility with RW, however pt remains at steadying assist level.   Toilet Transfers   Toilet Transfer From Bed   Toilet Transfer Type To   Toilet Transfer to Standard toilet  (with BSC over top)   Toilet Transfer Technique Ambulating   Toilet Transfers   (Steadying assistance)   Toilet Transfers Comments Pt participated in functional transfer to/from standard toilet with steadying assistance and no AD.   Cognition   Overall Cognitive Status WFL   Arousal/Participation Alert;Cooperative   Attention Within functional limits   Orientation Level Oriented X4   Memory Within functional limits   Following Commands Follows one step commands without difficulty   Activity Tolerance   Activity Tolerance Patient limited by fatigue  (Weakness)   Medical Staff Made Aware PT, Giovanni   Assessment   Assessment Pt completed OT tx session #4 focused on ADLs and functional mobility. Pt alert and agreeable to participate. PT/OT co-treat completed d/t significant mobility deficits and safety concerns. Pt demonstrated improvements in functional transfers/functional mobility this session but continues to be limited by fatigue, generalized weakness. Pt currently completing UB ADLs @ supervision/setup, LB ADLs @ supervision/setup, steadying assistance for any ADL in standing, and functional mobility with RW @ steadying assistance. Pt demonstrating G participation and G potential to  achieve goals but is currently demonstrating deficits in decrease activity tolerance, decrease standing balance, decrease performance during ADL tasks, decrease UB MS, decrease generalized strength, decrease activity engagement, decrease performance during functional transfers, limited family support, high fall risk, and limited insight to deficits. Continue to recommend Level II (Moderate Resource Intensity) upon discharge to increase safety and independence in ADL tasks and functional mobility. At end of session, pt seated in chair with chair alarm on, call bell in reach, and all needs met.   Plan   Treatment Interventions ADL retraining;Functional transfer training;UE strengthening/ROM;Endurance training;Patient/family training;Equipment evaluation/education;Compensatory technique education;Continued evaluation;Energy conservation;Activityengagement   Goal Expiration Date 04/04/24   OT Treatment Day 4   OT Frequency 3-5x/wk   Discharge Recommendation   Rehab Resource Intensity Level, OT II (Moderate Resource Intensity)   -PAC Daily Activity Inpatient   Lower Body Dressing 3   Bathing 3   Toileting 3   Upper Body Dressing 3   Grooming 3   Eating 4   Daily Activity Raw Score 19   Daily Activity Standardized Score (Calc for Raw Score >=11) 40.22   -PAC Applied Cognition Inpatient   Following a Speech/Presentation 3   Understanding Ordinary Conversation 4   Taking Medications 2   Remembering Where Things Are Placed or Put Away 2   Remembering List of 4-5 Errands 2   Taking Care of Complicated Tasks 2   Applied Cognition Raw Score 15   Applied Cognition Standardized Score 33.54         The patient's raw score on the -PAC Daily Activity Inpatient Short Form is 19. A raw score of greater than or equal to 19 suggests the patient may benefit from discharge to home. Despite the AM-PAC score recommendation, the pt is very deconditioned and would benefit from PAR to regain strength and endurance to maximize safety and  independence during ADLs and functional mobility. Please refer to the recommendation of the Occupational Therapist for safe discharge planning.    Pt goals to be met by 4/4/2024:     Pt will demonstrate ability to complete grooming/hygiene tasks @ mod I after set-up.  Pt will demonstrate ability to complete supine<>sit @ mod I in order to increase safety and independence during ADL tasks.  Pt will demonstrate ability to complete UB ADLs including washing/dressing @ mod I in order to increase performance and participation during meaningful tasks  Pt will demonstrate ability to complete LB dressing @ mod I in order to increase safety and independence during meaningful tasks.   Pt will demonstrate ability to nick/doff socks/shoes while sitting EOB/chair @ mod I in order to increase safety and independence during meaningful tasks.   Pt will demonstrate ability to complete toileting tasks including CM and pericare @ mod I in order to increase safety and independence during meaningful tasks.  Pt will demonstrate ability to complete EOB, chair, toilet/commode transfers @ mod I in order to increase performance and participation during functional tasks.  Pt will demonstrate ability to stand for 10 minutes while maintaining F+ balance with use of RW for UB support PRN.  Pt will demonstrate ability to tolerate 20 minute OT session with no vc'ing for deep breathing or use of energy conservation techniques in order to increase activity tolerance during functional tasks.   Pt will demonstrate Good carryover of use of energy conservation/compensatory strategies during ADLs and functional tasks in order to increase safety and reduce risk for falls.   Pt will demonstrate Good attention and participation in continued evaluation of functional ambulation house hold distances in order to assist with safe d/c planning.  Pt will attend to continued cognitive assessments 100% of the time in order to provide most appropriate d/c  recommendations.   Pt will follow 100% simple 2-step commands and be A&O x4 consistently with environmental cues to increase participation in functional activities.   Pt will identify 3 areas of interest/hobbies and 1 intervention on how to incorporate into daily life in order to increase interaction with environment and peers as well as increase participation in meaningful tasks.   Pt will demonstrate 100% carryover of BUE HEP in order to increase BUE MS and increase performance during functional tasks upon d/c home.       Maco Dubon MS, OTR/L

## 2024-03-29 VITALS
RESPIRATION RATE: 18 BRPM | BODY MASS INDEX: 39.25 KG/M2 | DIASTOLIC BLOOD PRESSURE: 73 MMHG | SYSTOLIC BLOOD PRESSURE: 128 MMHG | HEIGHT: 69 IN | OXYGEN SATURATION: 94 % | WEIGHT: 264.97 LBS | TEMPERATURE: 97.7 F | HEART RATE: 66 BPM

## 2024-03-29 PROBLEM — E87.6 HYPOKALEMIA: Status: RESOLVED | Noted: 2024-03-22 | Resolved: 2024-03-29

## 2024-03-29 LAB
ANION GAP SERPL CALCULATED.3IONS-SCNC: 1 MMOL/L (ref 4–13)
BUN SERPL-MCNC: 11 MG/DL (ref 5–25)
CALCIUM SERPL-MCNC: 7.7 MG/DL (ref 8.4–10.2)
CHLORIDE SERPL-SCNC: 101 MMOL/L (ref 96–108)
CO2 SERPL-SCNC: 31 MMOL/L (ref 21–32)
CREAT SERPL-MCNC: 0.86 MG/DL (ref 0.6–1.3)
ERYTHROCYTE [DISTWIDTH] IN BLOOD BY AUTOMATED COUNT: 13.2 % (ref 11.6–15.1)
GFR SERPL CREATININE-BSD FRML MDRD: 100 ML/MIN/1.73SQ M
GLUCOSE SERPL-MCNC: 74 MG/DL (ref 65–140)
HCT VFR BLD AUTO: 30 % (ref 36.5–49.3)
HGB BLD-MCNC: 10.1 G/DL (ref 12–17)
INR PPP: 2.44 (ref 0.84–1.19)
MCH RBC QN AUTO: 34.2 PG (ref 26.8–34.3)
MCHC RBC AUTO-ENTMCNC: 33.7 G/DL (ref 31.4–37.4)
MCV RBC AUTO: 102 FL (ref 82–98)
PLATELET # BLD AUTO: 90 THOUSANDS/UL (ref 149–390)
PMV BLD AUTO: 9.4 FL (ref 8.9–12.7)
POTASSIUM SERPL-SCNC: 3.9 MMOL/L (ref 3.5–5.3)
PROTHROMBIN TIME: 26.8 SECONDS (ref 11.6–14.5)
RBC # BLD AUTO: 2.95 MILLION/UL (ref 3.88–5.62)
SODIUM SERPL-SCNC: 133 MMOL/L (ref 135–147)
WBC # BLD AUTO: 5.92 THOUSAND/UL (ref 4.31–10.16)

## 2024-03-29 PROCEDURE — 85027 COMPLETE CBC AUTOMATED: CPT

## 2024-03-29 PROCEDURE — 80048 BASIC METABOLIC PNL TOTAL CA: CPT

## 2024-03-29 PROCEDURE — 85610 PROTHROMBIN TIME: CPT

## 2024-03-29 PROCEDURE — 99239 HOSP IP/OBS DSCHRG MGMT >30: CPT | Performed by: FAMILY MEDICINE

## 2024-03-29 RX ORDER — SPIRONOLACTONE 100 MG/1
100 TABLET, FILM COATED ORAL DAILY
Start: 2024-03-29

## 2024-03-29 RX ORDER — FUROSEMIDE 40 MG/1
40 TABLET ORAL DAILY
Start: 2024-03-29

## 2024-03-29 RX ADMIN — PANTOPRAZOLE SODIUM 40 MG: 40 TABLET, DELAYED RELEASE ORAL at 06:08

## 2024-03-29 RX ADMIN — LACTULOSE 30 G: 10 SOLUTION ORAL at 07:48

## 2024-03-29 RX ADMIN — Medication 1 TABLET: at 07:48

## 2024-03-29 RX ADMIN — RIFAXIMIN 550 MG: 550 TABLET ORAL at 07:47

## 2024-03-29 RX ADMIN — HEPARIN SODIUM 5000 UNITS: 5000 INJECTION INTRAVENOUS; SUBCUTANEOUS at 06:08

## 2024-03-29 RX ADMIN — CARVEDILOL 6.25 MG: 6.25 TABLET, FILM COATED ORAL at 07:48

## 2024-03-29 RX ADMIN — THIAMINE HCL TAB 100 MG 100 MG: 100 TAB at 07:47

## 2024-03-29 RX ADMIN — Medication 400 MG: at 07:48

## 2024-03-29 RX ADMIN — LAMOTRIGINE 25 MG: 25 TABLET ORAL at 07:47

## 2024-03-29 RX ADMIN — FUROSEMIDE 40 MG: 40 TABLET ORAL at 07:48

## 2024-03-29 RX ADMIN — FOLIC ACID 1 MG: 1 TABLET ORAL at 07:47

## 2024-03-29 RX ADMIN — SPIRONOLACTONE 100 MG: 100 TABLET, FILM COATED ORAL at 07:47

## 2024-03-29 RX ADMIN — FLUOXETINE 20 MG: 20 CAPSULE ORAL at 07:48

## 2024-03-29 NOTE — PLAN OF CARE
Problem: PAIN - ADULT  Goal: Verbalizes/displays adequate comfort level or baseline comfort level  Description: Interventions:  - Encourage patient to monitor pain and request assistance  - Assess pain using appropriate pain scale  - Administer analgesics based on type and severity of pain and evaluate response  - Implement non-pharmacological measures as appropriate and evaluate response  - Consider cultural and social influences on pain and pain management  - Notify physician/advanced practitioner if interventions unsuccessful or patient reports new pain  Outcome: Progressing     Problem: INFECTION - ADULT  Goal: Absence or prevention of progression during hospitalization  Description: INTERVENTIONS:  - Assess and monitor for signs and symptoms of infection  - Monitor lab/diagnostic results  - Monitor all insertion sites, i.e. indwelling lines, tubes, and drains  - Monitor endotracheal if appropriate and nasal secretions for changes in amount and color  - [place] appropriate cooling/warming therapies per order  - Administer medications as ordered  - Instruct and encourage patient and family to use good hand hygiene technique  - Identify and instruct in appropriate isolation precautions for identified infection/condition  Outcome: Progressing  Goal: Absence of fever/infection during neutropenic period  Description: INTERVENTIONS:  - Monitor WBC    Outcome: Progressing     Problem: SAFETY ADULT  Goal: Patient will remain free of falls  Description: INTERVENTIONS:  - Educate patient/family on patient safety including physical limitations  - Instruct patient to call for assistance with activity   - Consult OT/PT to assist with strengthening/mobility   - Keep Call bell within reach  - Keep bed low and locked with side rails adjusted as appropriate  - Keep care items and personal belongings within reach  - Initiate and maintain comfort rounds  - Make Fall Risk Sign visible to staff  - Offer Toileting every 2 Hours,  in advance of need  - Initiate/Maintain bed alarm  - Obtain necessary fall risk management equipment: call bell in reach.alarm activated  - Apply yellow socks and bracelet for high fall risk patients  - Consider moving patient to room near nurses station  Outcome: Progressing  Goal: Maintain or return to baseline ADL function  Description: INTERVENTIONS:  -  Assess patient's ability to carry out ADLs; assess patient's baseline for ADL function and identify physical deficits which impact ability to perform ADLs (bathing, care of mouth/teeth, toileting, grooming, dressing, etc.)  - Assess/evaluate cause of self-care deficits   - Assess range of motion  - Assess patient's mobility; develop plan if impaired  - Assess patient's need for assistive devices and provide as appropriate  - Encourage maximum independence but intervene and supervise when necessary  - Involve family in performance of ADLs  - Assess for home care needs following discharge   - Consider OT consult to assist with ADL evaluation and planning for discharge  - Provide patient education as appropriate  Outcome: Progressing  Goal: Maintains/Returns to pre admission functional level  Description: INTERVENTIONS:  - Perform AM-PAC 6 Click Basic Mobility/ Daily Activity assessment daily.  - Set and communicate daily mobility goal to care team and patient/family/caregiver.   - Collaborate with rehabilitation services on mobility goals if consulted  - Perform Range of Motion 2 times a day.  - Reposition patient every 2 hours.  - Dangle patient 3 times a day  - Stand patient 3 times a day  - Ambulate patient 3 times a day  - Out of bed to chair 3 times a day   - Out of bed for meals 3 times a day  - Out of bed for toileting  - Record patient progress and toleration of activity level   Outcome: Progressing     Problem: DISCHARGE PLANNING  Goal: Discharge to home or other facility with appropriate resources  Description: INTERVENTIONS:  - Identify barriers to  discharge w/patient and caregiver  - Arrange for needed discharge resources and transportation as appropriate  - Identify discharge learning needs (meds, wound care, etc.)  - Arrange for interpretive services to assist at discharge as needed  - Refer to Case Management Department for coordinating discharge planning if the patient needs post-hospital services based on physician/advanced practitioner order or complex needs related to functional status, cognitive ability, or social support system  Outcome: Progressing     Problem: Knowledge Deficit  Goal: Patient/family/caregiver demonstrates understanding of disease process, treatment plan, medications, and discharge instructions  Description: Complete learning assessment and assess knowledge base.  Interventions:  - Provide teaching at level of understanding  - Provide teaching via preferred learning methods  Outcome: Not Progressing     Problem: Prexisting or High Potential for Compromised Skin Integrity  Goal: Skin integrity is maintained or improved  Description: INTERVENTIONS:  - Identify patients at risk for skin breakdown  - Assess and monitor skin integrity  - Assess and monitor nutrition and hydration status  - Monitor labs   - Assess for incontinence   - Turn and reposition patient  - Assist with mobility/ambulation  - Relieve pressure over bony prominences  - Avoid friction and shearing  - Provide appropriate hygiene as needed including keeping skin clean and dry  - Evaluate need for skin moisturizer/barrier cream  - Collaborate with interdisciplinary team   - Patient/family teaching  - Consider wound care consult   Outcome: Progressing     Problem: NEUROSENSORY - ADULT  Goal: Remains free of injury related to seizures activity  Description: INTERVENTIONS  - Maintain airway, patient safety  and administer oxygen as ordered  - Monitor patient for seizure activity, document and report duration and description of seizure to physician/advanced practitioner  -  If seizure occurs,  ensure patient safety during seizure  - Reorient patient post seizure  - Seizure pads on all 4 side rails  - Instruct patient/family to notify RN of any seizure activity including if an aura is experienced  - Instruct patient/family to call for assistance with activity based on nursing assessment  - Administer anti-seizure medications if ordered    Outcome: Progressing  Goal: Achieves maximal functionality and self care  Description: INTERVENTIONS  - Monitor swallowing and airway patency with patient fatigue and changes in neurological status  - Encourage and assist patient to increase activity and self care.   - Encourage visually impaired, hearing impaired and aphasic patients to use assistive/communication devices  Outcome: Progressing     Problem: GASTROINTESTINAL - ADULT  Goal: Maintains adequate nutritional intake  Description: INTERVENTIONS:  - Monitor percentage of each meal consumed  - Identify factors contributing to decreased intake, treat as appropriate  - Assist with meals as needed  - Monitor I&O, weight, and lab values if indicated  - Obtain nutrition services referral as needed  Outcome: Progressing     Problem: METABOLIC, FLUID AND ELECTROLYTES - ADULT  Goal: Electrolytes maintained within normal limits  Description: INTERVENTIONS:  - Monitor labs and assess patient for signs and symptoms of electrolyte imbalancesconfusion  - Administer electrolyte replacement as ordered  - Monitor response to electrolyte replacements, including repeat lab results as appropriate  - Instruct patient on fluid and nutrition as appropriate  Outcome: Progressing  Goal: Fluid balance maintained  Description: INTERVENTIONS:  - Monitor labs   - Monitor I/O and WT  - Instruct patient on fluid and nutrition as appropriate  - Assess for signs & symptoms of volume excess or deficit  Outcome: Progressing

## 2024-03-29 NOTE — ASSESSMENT & PLAN NOTE
Secondary to cirrhosis  Platelet count trending up  Transfuse for plt <20k  Dc heparin his inr is 2.4

## 2024-03-29 NOTE — NURSING NOTE
Pt scoring < 7 on CIWA for over 72hrs. Per protocol, OK to discontinue. Pt is resting comfortably, denies any pain and alcohol withdrawal symptoms. CARLOS Peace aware.

## 2024-03-29 NOTE — CASE MANAGEMENT
Unused approved authorization request has been updated to reflect SOC of 03/29. Confirmed with Gabriela at [facility] that updated clinicals were received and auth is till valid. Info below.     [place] [facility] has received APPROVED authorization.  Insurance: [facility]   Called in by Rep: Gabriela ELENA#: [phone]   Authorization received for: SNF  Facility: [facility]   Authorization #: [id number]   Start of Care: 03/29  Next Review Date: 2 Business Days   Submit next review to F#: [phone]       Care Manager notified: Eve Aviles

## 2024-03-29 NOTE — NURSING NOTE
Patient necklace with a cross on it and cell phone  were found in bed linens after patient was picked up by transport this afternoon. Call placed to [facility] to let them know these items were found and this RN will be dropping them off for the patient this evening once my shift has ended.

## 2024-03-29 NOTE — ASSESSMENT & PLAN NOTE
Continue PT OT, patient recommended for short term rehab  He is agreeable to rehab so long as it lasts under 29 days  CM to assist  Dc to [facility]

## 2024-03-29 NOTE — DISCHARGE SUMMARY
[facility]  Discharge- Curt Dominguez 1970, 53 y.o. male MRN: [id number]  Unit/Bed#: MS [facility] Encounter: [id number]  Primary Care Provider: Leonela Oseguera MD   Date and time admitted to hospital: 3/20/2024 12:43 PM    GERD (gastroesophageal reflux disease)  Assessment & Plan  Continue PPI    Hypertension  Assessment & Plan  Bp stable   Continue Coreg, Lasix and Aldactone with hold parameters.    Macrocytic anemia  Assessment & Plan  Folate level low  Initiated on folic acid supplement    Elevated INR  Assessment & Plan  In light of liver cirrhosis     SERGEI (obstructive sleep apnea)  Assessment & Plan  Patient noncompliant with CPAP  May be contributing to patient's tiredness during the day   Will need CPAP ordered upon discharge, ordered  Should follow up for sleep study outpatient     Bipolar depression (HCC)  Assessment & Plan  Continue Prozac    Ambulatory dysfunction  Assessment & Plan  Continue PT OT, patient recommended for short term rehab  He is agreeable to rehab so long as it lasts under 29 days  CM to assist  Dc to [facility]    Thrombocytopenia ([facility])  Assessment & Plan  Secondary to cirrhosis  Platelet count trending up  Transfuse for plt <20k  Dc heparin his inr is 2.4    Alcoholic cirrhosis (HCC)  Assessment & Plan  Decompensated alcohol induced liver cirrhosis.  Suspected hepatic encephalopathy, continue lactulose, repeat ammonia level wnl  GI following, appreciate recs  Recommending Lasix 40 mg daily and spironolactone 100 mg daily transitioned to po lasix 40 mg po daily 3/28 leg edema significantly improved  On lactulose    MELD 3.0: 28 at 3/29/2024  6:24 AM  MELD-Na: 26 at 3/29/2024  6:24 AM  Calculated from:  Serum Creatinine: 0.86 mg/dL (Using min of 1 mg/dL) at 3/29/2024  6:24 AM  Serum Sodium: 133 mmol/L at 3/29/2024  6:24 AM  Total Bilirubin: 8.69 mg/dL at 3/28/2024  5:03 AM  Serum Albumin: 1.9 g/dL at 3/28/2024  5:03 AM  INR(ratio): 2.44 at 3/29/2024   6:24 AM  Age at listing (hypothetical): 53 years  Sex: Male at 3/29/2024  6:24 AM        Alcohol abuse  Assessment & Plan  Chronic alcohol use  Now completed with librium taper  No signs of withdrawal    * Pleural effusion, right  Assessment & Plan  Appreciate critical care team input for pleural effusion management, additional fluid removed 3/24  Repeat CXR mildly improved aeration of R lung and persistent small R effusion   On room air    Hypokalemia-resolved as of 3/29/2024  Assessment & Plan  Monitor potassium level daily and replace as needed            Medical Problems       Resolved Problems  Date Reviewed: 3/29/2024            Resolved    SIRS (systemic inflammatory response syndrome) (HCC) 3/21/2024     Resolved by  Ilan Rosario, DO    Acute respiratory failure with hypoxia (HCC) 3/22/2024     Resolved by  Ilan Rosario, DO    Hyponatremia 3/21/2024     Resolved by  Ilan Rosario, DO    Lactic acid acidosis 3/21/2024     Resolved by  Ilan Rosario, DO    Hypokalemia 3/29/2024     Resolved by  Lilly Rausch MD        Discharging Physician / Practitioner: Lilly Rausch MD  PCP: Leonela Oseguera MD  Admission Date:   Admission Orders (From admission, onward)       Ordered        03/20/24 1624  INPATIENT ADMISSION  Once                          Discharge Date: 03/29/24    Consultations During Hospital Stay:  Pulmonary  Gastroenterology  Critical care  IR    Procedures Performed:   Chest tube placement    Significant Findings / Test Results:   CT chest abdomen and pelvis- Large right effusion opacifies the right hemithorax with shift of the mediastinum to the left. No obvious mass is seen. A few air bronchograms are noted. Infection/empyema is not excluded given the degree of pleural fluid. Underlying neoplasm could also   be present. Further pulmonary evaluation is advised.     The left lung appears unremarkable.     Splenomegaly with fatty infiltration of liver. There is evidence of venous  "collaterals suggesting possible portal hypertension versus prior venous occlusion/embolization with collateralization.     Small amount of ascites.     Diffuse bladder wall thickening could be associated with cystitis.  Ultrasound of the right upper quadrant- Echogenic liver suggestive of steatosis. Correlate to exclude history or symptoms of hepatocellular disease.  Cholelithiasis.  Gallbladder wall thickening. In view of negative Wyatt sign, this may be due to portal hypertension. Correlate with clinical findings.  Reversal of flow in the main portal vein, likely due to portal hypertension.  Right pleural effusion.  Cxr-Improved aeration of the right lung. Persistent small right effusion.   Incidental Findings:   See above to follow-up outpatient    Test Results Pending at Discharge (will require follow up):   None     Outpatient Tests Requested:  None    Complications: None    Reason for Admission: Right pleural effusion    Hospital Course:   Curt Dominguez is a 53 y.o. male patient who originally presented to the hospital on 3/20/2024 due to right pleural effusion status post IR chest tube placement and admission to critical care.  With pulmonology following.  Had SIRS evidence of alcoholic cirrhosis with decompensation and compliance with medications.  Overall did well with his tube it was removed 3/24, repeat chest x-ray showed improvement.  Diuresed well switch to p.o. diuretics.  Cleared to be discharged to SNF        Please see above list of diagnoses and related plan for additional information.     Condition at Discharge: stable    Discharge Day Visit / Exam:   Subjective:  seen and examined no complaints   Vitals: Blood Pressure: 128/73 (03/29/24 0713)  Pulse: 66 (03/29/24 0713)  Temperature: 97.7 °F (36.5 °C) (03/29/24 0713)  Temp Source: Temporal (03/28/24 2000)  Respirations: 18 (03/29/24 0713)  Height: 5' 9\" (175.3 cm) (03/21/24 0909)  Weight - Scale: 120 kg (264 lb 15.5 oz) (03/29/24 0554)  SpO2: " 94 % (03/29/24 6326)  Exam:   Physical Exam  Vitals and nursing note reviewed.   Constitutional:       General: He is not in acute distress.     Appearance: He is well-developed.   HENT:      Head: Normocephalic and atraumatic.   Eyes:      Conjunctiva/sclera: Conjunctivae normal.   Cardiovascular:      Rate and Rhythm: Normal rate and regular rhythm.      Heart sounds: No murmur heard.  Pulmonary:      Effort: Pulmonary effort is normal. No respiratory distress.      Breath sounds: Normal breath sounds. No wheezing or rales.   Abdominal:      General: There is no distension.      Palpations: Abdomen is soft.      Tenderness: There is no abdominal tenderness.   Musculoskeletal:         General: Swelling (mild b/l) present.      Cervical back: Neck supple.   Skin:     General: Skin is warm and dry.      Capillary Refill: Capillary refill takes less than 2 seconds.      Coloration: Skin is jaundiced.   Neurological:      Mental Status: He is alert and oriented to person, place, and time.   Psychiatric:         Mood and Affect: Mood normal.          Discussion with Family: Patient declined call to .     Discharge instructions/Information to patient and family:   See after visit summary for information provided to patient and family.      Provisions for Follow-Up Care:  See after visit summary for information related to follow-up care and any pertinent home health orders.      Mobility at time of Discharge:   Basic Mobility Inpatient Raw Score: 18  JH-HLM Goal: 6: Walk 10 steps or more  JH-HLM Achieved: 3: Sit at edge of bed  HLM Goal achieved. Continue to encourage appropriate mobility.     Disposition:   Other Skilled Nursing Facility at [facility]     Planned Readmission: no      Discharge Statement:  I spent >35 minutes discharging the patient. This time was spent on the day of discharge. I had direct contact with the patient on the day of discharge. Greater than 50% of the total time was spent  examining patient, answering all patient questions, arranging and discussing plan of care with patient as well as directly providing post-discharge instructions.  Additional time then spent on discharge activities.    Discharge Medications:  See after visit summary for reconciled discharge medications provided to patient and/or family.      **Please Note: This note may have been constructed using a voice recognition system**

## 2024-03-29 NOTE — ASSESSMENT & PLAN NOTE
Decompensated alcohol induced liver cirrhosis.  Suspected hepatic encephalopathy, continue lactulose, repeat ammonia level wnl  GI following, appreciate recs  Recommending Lasix 40 mg daily and spironolactone 100 mg daily transitioned to po lasix 40 mg po daily 3/28 leg edema significantly improved  On lactulose    MELD 3.0: 28 at 3/29/2024  6:24 AM  MELD-Na: 26 at 3/29/2024  6:24 AM  Calculated from:  Serum Creatinine: 0.86 mg/dL (Using min of 1 mg/dL) at 3/29/2024  6:24 AM  Serum Sodium: 133 mmol/L at 3/29/2024  6:24 AM  Total Bilirubin: 8.69 mg/dL at 3/28/2024  5:03 AM  Serum Albumin: 1.9 g/dL at 3/28/2024  5:03 AM  INR(ratio): 2.44 at 3/29/2024  6:24 AM  Age at listing (hypothetical): 53 years  Sex: Male at 3/29/2024  6:24 AM

## 2024-03-29 NOTE — CASE MANAGEMENT
Case Management Discharge Planning Note    Patient name Curt Dominguez  Location /-01 MRN [id number]  : 1970 Date 3/29/2024       Current Admission Date: 3/20/2024  Current Admission Diagnosis:Pleural effusion, right   Patient Active Problem List    Diagnosis Date Noted    Macrocytic anemia 2024    Hypertension 2024    GERD (gastroesophageal reflux disease) 2024    Alcohol abuse 2024    Pleural effusion, right 2024    Alcoholic cirrhosis (HCC) 2024    Thrombocytopenia (HCC) 2024    SERGEI (obstructive sleep apnea) 2024    Elevated INR 2024    Serum total bilirubin elevated 2024    Ambulatory dysfunction 2023    Bipolar depression (HCC) 2014      LOS (days): 9  Geometric Mean LOS (GMLOS) (days):   Days to GMLOS:     OBJECTIVE:  Risk of Unplanned Readmission Score: 15.39         Current admission status: Inpatient   Preferred Pharmacy:   [facility] [place] [address] [facility]  [address] [facility] [address]  Phone: [phone] Fax: [phone]    [facility]/pharmacy #[id number] [place], [place] - [address] [place] [id number]  Phone: [phone] Fax: [phone]    Primary Care Provider: Leonela Oseguera MD    Primary Insurance: MARCIAL PEREIRA  Secondary Insurance:     DISCHARGE DETAILS:           Transported by (Company and Unit #): [facility] [place]  ETA of Transport (Date): 24  ETA of Transport (Time): 1300        CM spoke to patient - discussed discharge today to [facility]. Discussed with patient that transportation via  is not covered by insurance and patient will be billed for this service. Verbalized understanding.      CM confirmed with [facility] patient's anticipated discharge today and transportation time of 1pm.       Cm completed request in [employer] for [employer] van at 1pm. [facility] accepted as such.     CM to follow patient's care and  discharge needs.

## 2024-04-01 NOTE — UTILIZATION REVIEW
NOTIFICATION OF ADMISSION DISCHARGE   This is a Notification of Discharge from [facility]. Please be advised that this patient has been discharge from our facility. Below you will find the admission and discharge date and time including the patient’s disposition.   UTILIZATION REVIEW CONTACT:  Amber Olson  Utilization   Network Utilization Review Department  Phone: [phone] x carefully listen to the prompts. All voicemails are confidential.  Email: [email or web address]     ADMISSION INFORMATION  PRESENTATION DATE: 3/20/2024 12:43 PM  OBERVATION ADMISSION DATE:   INPATIENT ADMISSION DATE: 3/20/24  4:24 PM   DISCHARGE DATE: 3/29/2024  1:39 PM   DISPOSITION:Non [facility] SNF/TCU/SNU    Network Utilization Review Department  ATTENTION: Please call with any questions or concerns to [phone] and carefully listen to the prompts so that you are directed to the right person. All voicemails are confidential.   For Discharge needs, contact Care Management DC Support Team at [phone] opt. 2  Send all requests for admission clinical reviews, approved or denied determinations and any other requests to dedicated fax number below belonging to the campus where the patient is receiving treatment. List of dedicated fax numbers for the Facilities:  FACILITY NAME UR FAX NUMBER   ADMISSION DENIALS (Administrative/Medical Necessity) [phone]   DISCHARGE SUPPORT TEAM ([facility]) [phone]   PARENT CHILD HEALTH (Maternity/NICU/Pediatrics) [phone]   [facility] [phone]   [facility] [phone]   [facility] [phone]   [facility] [phone]   [facility] [phone]   [facility] [phone]   [facility] [phone]   [facility]  [place] [phone]   [facility] [phone]   [facility] [phone]   [facility] [phone]   [facility], [place]) [phone]

## 2024-04-26 ENCOUNTER — APPOINTMENT (EMERGENCY)
Dept: CT IMAGING | Facility: HOSPITAL | Age: 54
End: 2024-04-26
Payer: COMMERCIAL

## 2024-04-26 ENCOUNTER — HOSPITAL ENCOUNTER (EMERGENCY)
Facility: HOSPITAL | Age: 54
Discharge: OTHER INSTITUTION | End: 2024-04-26
Attending: EMERGENCY MEDICINE
Payer: COMMERCIAL

## 2024-04-26 ENCOUNTER — HOSPITAL ENCOUNTER (INPATIENT)
Facility: HOSPITAL | Age: 54
LOS: 5 days | Discharge: HOME | End: 2024-05-01
Attending: EMERGENCY MEDICINE | Admitting: EMERGENCY MEDICINE
Payer: COMMERCIAL

## 2024-04-26 VITALS
TEMPERATURE: 96.4 F | RESPIRATION RATE: 14 BRPM | HEART RATE: 82 BPM | SYSTOLIC BLOOD PRESSURE: 118 MMHG | BODY MASS INDEX: 31.61 KG/M2 | WEIGHT: 214.07 LBS | DIASTOLIC BLOOD PRESSURE: 67 MMHG | OXYGEN SATURATION: 98 %
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
## 2024-04-26 DIAGNOSIS — F10.90 ALCOHOL USE DISORDER: ICD-10-CM

## 2024-04-26 DIAGNOSIS — R26.2 AMBULATORY DYSFUNCTION: ICD-10-CM

## 2024-04-26 DIAGNOSIS — R79.1 ELEVATED INR: ICD-10-CM

## 2024-04-26 DIAGNOSIS — F10.929 ALCOHOL INTOXICATION (HCC): Primary | ICD-10-CM

## 2024-04-26 DIAGNOSIS — R17 SERUM TOTAL BILIRUBIN ELEVATED: Primary | ICD-10-CM

## 2024-04-26 DIAGNOSIS — F10.10 ALCOHOL ABUSE: ICD-10-CM

## 2024-04-26 DIAGNOSIS — K70.31 ALCOHOLIC CIRRHOSIS OF LIVER WITH ASCITES (HCC): ICD-10-CM

## 2024-04-26 DIAGNOSIS — D64.9 NORMOCYTIC ANEMIA, NOT DUE TO BLOOD LOSS: ICD-10-CM

## 2024-04-26 DIAGNOSIS — K70.30 ALCOHOLIC CIRRHOSIS, UNSPECIFIED WHETHER ASCITES PRESENT (HCC): ICD-10-CM

## 2024-04-26 DIAGNOSIS — F10.20 ALCOHOL USE DISORDER, SEVERE, DEPENDENCE (HCC): ICD-10-CM

## 2024-04-26 DIAGNOSIS — J90 PLEURAL EFFUSION, RIGHT: ICD-10-CM

## 2024-04-26 PROBLEM — F10.939 ALCOHOL WITHDRAWAL SYNDROME WITH COMPLICATION (HCC): Status: ACTIVE | Noted: 2024-04-26

## 2024-04-26 PROBLEM — E83.42 HYPOMAGNESEMIA: Status: ACTIVE | Noted: 2024-04-26

## 2024-04-26 LAB
2HR DELTA HS TROPONIN: 0 NG/L
ALBUMIN SERPL BCP-MCNC: 2.2 G/DL (ref 3.5–5)
ALBUMIN SERPL BCP-MCNC: 2.5 G/DL (ref 3.5–5)
ALP SERPL-CCNC: 81 U/L (ref 34–104)
ALP SERPL-CCNC: 87 U/L (ref 34–104)
ALT SERPL W P-5'-P-CCNC: 14 U/L (ref 7–52)
ALT SERPL W P-5'-P-CCNC: 17 U/L (ref 7–52)
AMMONIA PLAS-SCNC: 57 UMOL/L (ref 18–72)
AMPHETAMINES SERPL QL SCN: NEGATIVE
ANION GAP SERPL CALCULATED.3IONS-SCNC: 10 MMOL/L (ref 4–13)
ANION GAP SERPL CALCULATED.3IONS-SCNC: 7 MMOL/L (ref 4–13)
APTT PPP: 43 SECONDS (ref 23–37)
AST SERPL W P-5'-P-CCNC: 38 U/L (ref 13–39)
AST SERPL W P-5'-P-CCNC: 42 U/L (ref 13–39)
BACTERIA UR QL AUTO: NORMAL /HPF
BARBITURATES UR QL: NEGATIVE
BASOPHILS # BLD AUTO: 0.07 THOUSANDS/ÂΜL (ref 0–0.1)
BASOPHILS NFR BLD AUTO: 1 % (ref 0–1)
BENZODIAZ UR QL: NEGATIVE
BILIRUB DIRECT SERPL-MCNC: 2.52 MG/DL (ref 0–0.2)
BILIRUB SERPL-MCNC: 7.01 MG/DL (ref 0.2–1)
BILIRUB SERPL-MCNC: 7.73 MG/DL (ref 0.2–1)
BILIRUB UR QL STRIP: ABNORMAL
BNP SERPL-MCNC: 105 PG/ML (ref 0–100)
BUN SERPL-MCNC: 4 MG/DL (ref 5–25)
BUN SERPL-MCNC: 5 MG/DL (ref 5–25)
CALCIUM ALBUM COR SERPL-MCNC: 8.8 MG/DL (ref 8.3–10.1)
CALCIUM ALBUM COR SERPL-MCNC: 8.9 MG/DL (ref 8.3–10.1)
CALCIUM SERPL-MCNC: 7.4 MG/DL (ref 8.4–10.2)
CALCIUM SERPL-MCNC: 7.7 MG/DL (ref 8.4–10.2)
CARDIAC TROPONIN I PNL SERPL HS: 6 NG/L
CARDIAC TROPONIN I PNL SERPL HS: 6 NG/L
CHLORIDE SERPL-SCNC: 104 MMOL/L (ref 96–108)
CHLORIDE SERPL-SCNC: 107 MMOL/L (ref 96–108)
CLARITY UR: CLEAR
CO2 SERPL-SCNC: 23 MMOL/L (ref 21–32)
CO2 SERPL-SCNC: 28 MMOL/L (ref 21–32)
COCAINE UR QL: NEGATIVE
COLOR UR: ABNORMAL
CREAT SERPL-MCNC: 0.67 MG/DL (ref 0.6–1.3)
CREAT SERPL-MCNC: 0.71 MG/DL (ref 0.6–1.3)
EOSINOPHIL # BLD AUTO: 0.58 THOUSAND/ÂΜL (ref 0–0.61)
EOSINOPHIL NFR BLD AUTO: 10 % (ref 0–6)
ERYTHROCYTE [DISTWIDTH] IN BLOOD BY AUTOMATED COUNT: 13.9 % (ref 11.6–15.1)
ETHANOL SERPL-MCNC: 362 MG/DL
FENTANYL UR QL SCN: NEGATIVE
FLUAV RNA RESP QL NAA+PROBE: NEGATIVE
FLUBV RNA RESP QL NAA+PROBE: NEGATIVE
GFR SERPL CREATININE-BSD FRML MDRD: 108 ML/MIN/1.73SQ M
GFR SERPL CREATININE-BSD FRML MDRD: 111 ML/MIN/1.73SQ M
GLUCOSE SERPL-MCNC: 103 MG/DL (ref 65–140)
GLUCOSE SERPL-MCNC: 98 MG/DL (ref 65–140)
GLUCOSE UR STRIP-MCNC: NEGATIVE MG/DL
HCT VFR BLD AUTO: 32.2 % (ref 36.5–49.3)
HGB BLD-MCNC: 10.9 G/DL (ref 12–17)
HGB UR QL STRIP.AUTO: ABNORMAL
HYDROCODONE UR QL SCN: NEGATIVE
IMM GRANULOCYTES # BLD AUTO: 0.02 THOUSAND/UL (ref 0–0.2)
IMM GRANULOCYTES NFR BLD AUTO: 0 % (ref 0–2)
INR PPP: 2.15 (ref 0.84–1.19)
KETONES UR STRIP-MCNC: NEGATIVE MG/DL
LACTATE SERPL-SCNC: 2.1 MMOL/L (ref 0.5–2)
LACTATE SERPL-SCNC: 2.2 MMOL/L (ref 0.5–2)
LACTATE SERPL-SCNC: 2.3 MMOL/L (ref 0.5–2)
LEUKOCYTE ESTERASE UR QL STRIP: NEGATIVE
LIPASE SERPL-CCNC: 47 U/L (ref 11–82)
LYMPHOCYTES # BLD AUTO: 2.07 THOUSANDS/ÂΜL (ref 0.6–4.47)
LYMPHOCYTES NFR BLD AUTO: 34 % (ref 14–44)
MAGNESIUM SERPL-MCNC: 1.5 MG/DL (ref 1.9–2.7)
MCH RBC QN AUTO: 32.8 PG (ref 26.8–34.3)
MCHC RBC AUTO-ENTMCNC: 33.9 G/DL (ref 31.4–37.4)
MCV RBC AUTO: 97 FL (ref 82–98)
METHADONE UR QL: NEGATIVE
MONOCYTES # BLD AUTO: 0.76 THOUSAND/ÂΜL (ref 0.17–1.22)
MONOCYTES NFR BLD AUTO: 13 % (ref 4–12)
NEUTROPHILS # BLD AUTO: 2.51 THOUSANDS/ÂΜL (ref 1.85–7.62)
NEUTS SEG NFR BLD AUTO: 42 % (ref 43–75)
NITRITE UR QL STRIP: NEGATIVE
NON-SQ EPI CELLS URNS QL MICRO: NORMAL /HPF
NRBC BLD AUTO-RTO: 0 /100 WBCS
OPIATES UR QL SCN: NEGATIVE
OXYCODONE+OXYMORPHONE UR QL SCN: NEGATIVE
PCP UR QL: NEGATIVE
PH UR STRIP.AUTO: 6.5 [PH]
PLATELET # BLD AUTO: 94 THOUSANDS/UL (ref 149–390)
PMV BLD AUTO: 9.4 FL (ref 8.9–12.7)
POTASSIUM SERPL-SCNC: 3.4 MMOL/L (ref 3.5–5.3)
POTASSIUM SERPL-SCNC: 3.5 MMOL/L (ref 3.5–5.3)
PROT SERPL-MCNC: 5.4 G/DL (ref 6.4–8.4)
PROT SERPL-MCNC: 5.9 G/DL (ref 6.4–8.4)
PROT UR STRIP-MCNC: NEGATIVE MG/DL
PROTHROMBIN TIME: 24.4 SECONDS (ref 11.6–14.5)
RBC # BLD AUTO: 3.32 MILLION/UL (ref 3.88–5.62)
RBC #/AREA URNS AUTO: NORMAL /HPF
RSV RNA RESP QL NAA+PROBE: NEGATIVE
SARS-COV-2 RNA RESP QL NAA+PROBE: NEGATIVE
SODIUM SERPL-SCNC: 139 MMOL/L (ref 135–147)
SODIUM SERPL-SCNC: 140 MMOL/L (ref 135–147)
SP GR UR STRIP.AUTO: <=1.005 (ref 1–1.03)
THC UR QL: NEGATIVE
UROBILINOGEN UR QL STRIP.AUTO: 4 E.U./DL
WBC # BLD AUTO: 6.01 THOUSAND/UL (ref 4.31–10.16)
WBC #/AREA URNS AUTO: NORMAL /HPF

## 2024-04-26 PROCEDURE — 85025 COMPLETE CBC W/AUTO DIFF WBC: CPT | Performed by: PHYSICIAN ASSISTANT

## 2024-04-26 PROCEDURE — 83735 ASSAY OF MAGNESIUM: CPT

## 2024-04-26 PROCEDURE — 70450 CT HEAD/BRAIN W/O DYE: CPT

## 2024-04-26 PROCEDURE — 74177 CT ABD & PELVIS W/CONTRAST: CPT

## 2024-04-26 PROCEDURE — 84484 ASSAY OF TROPONIN QUANT: CPT | Performed by: PHYSICIAN ASSISTANT

## 2024-04-26 PROCEDURE — 80053 COMPREHEN METABOLIC PANEL: CPT

## 2024-04-26 PROCEDURE — 80307 DRUG TEST PRSMV CHEM ANLYZR: CPT | Performed by: PHYSICIAN ASSISTANT

## 2024-04-26 PROCEDURE — 71260 CT THORAX DX C+: CPT

## 2024-04-26 PROCEDURE — 83880 ASSAY OF NATRIURETIC PEPTIDE: CPT | Performed by: PHYSICIAN ASSISTANT

## 2024-04-26 PROCEDURE — 82140 ASSAY OF AMMONIA: CPT | Performed by: PHYSICIAN ASSISTANT

## 2024-04-26 PROCEDURE — 0241U HB NFCT DS VIR RESP RNA 4 TRGT: CPT | Performed by: PHYSICIAN ASSISTANT

## 2024-04-26 PROCEDURE — 85730 THROMBOPLASTIN TIME PARTIAL: CPT | Performed by: PHYSICIAN ASSISTANT

## 2024-04-26 PROCEDURE — 82248 BILIRUBIN DIRECT: CPT

## 2024-04-26 PROCEDURE — 99285 EMERGENCY DEPT VISIT HI MDM: CPT | Performed by: EMERGENCY MEDICINE

## 2024-04-26 PROCEDURE — 80053 COMPREHEN METABOLIC PANEL: CPT | Performed by: PHYSICIAN ASSISTANT

## 2024-04-26 PROCEDURE — 93005 ELECTROCARDIOGRAM TRACING: CPT

## 2024-04-26 PROCEDURE — 99291 CRITICAL CARE FIRST HOUR: CPT

## 2024-04-26 PROCEDURE — 83605 ASSAY OF LACTIC ACID: CPT

## 2024-04-26 PROCEDURE — 96360 HYDRATION IV INFUSION INIT: CPT

## 2024-04-26 PROCEDURE — 81001 URINALYSIS AUTO W/SCOPE: CPT | Performed by: PHYSICIAN ASSISTANT

## 2024-04-26 PROCEDURE — 85610 PROTHROMBIN TIME: CPT | Performed by: PHYSICIAN ASSISTANT

## 2024-04-26 PROCEDURE — 36415 COLL VENOUS BLD VENIPUNCTURE: CPT | Performed by: PHYSICIAN ASSISTANT

## 2024-04-26 PROCEDURE — HZ2ZZZZ DETOXIFICATION SERVICES FOR SUBSTANCE ABUSE TREATMENT: ICD-10-PCS | Performed by: EMERGENCY MEDICINE

## 2024-04-26 PROCEDURE — 72125 CT NECK SPINE W/O DYE: CPT

## 2024-04-26 PROCEDURE — 83605 ASSAY OF LACTIC ACID: CPT | Performed by: PHYSICIAN ASSISTANT

## 2024-04-26 PROCEDURE — 99285 EMERGENCY DEPT VISIT HI MDM: CPT

## 2024-04-26 PROCEDURE — 83690 ASSAY OF LIPASE: CPT | Performed by: PHYSICIAN ASSISTANT

## 2024-04-26 PROCEDURE — 82077 ASSAY SPEC XCP UR&BREATH IA: CPT | Performed by: PHYSICIAN ASSISTANT

## 2024-04-26 RX ORDER — SODIUM CHLORIDE 9 MG/ML
100 INJECTION, SOLUTION INTRAVENOUS CONTINUOUS
Status: CANCELLED | OUTPATIENT
Start: 2024-04-26

## 2024-04-26 RX ORDER — MAGNESIUM HYDROXIDE/ALUMINUM HYDROXICE/SIMETHICONE 120; 1200; 1200 MG/30ML; MG/30ML; MG/30ML
30 SUSPENSION ORAL EVERY 6 HOURS PRN
Status: DISCONTINUED | OUTPATIENT
Start: 2024-04-26 | End: 2024-05-01 | Stop reason: HOSPADM

## 2024-04-26 RX ORDER — LANOLIN ALCOHOL/MO/W.PET/CERES
6 CREAM (GRAM) TOPICAL
Status: CANCELLED | OUTPATIENT
Start: 2024-04-26

## 2024-04-26 RX ORDER — MAGNESIUM SULFATE HEPTAHYDRATE 40 MG/ML
4 INJECTION, SOLUTION INTRAVENOUS ONCE
Status: COMPLETED | OUTPATIENT
Start: 2024-04-26 | End: 2024-04-27

## 2024-04-26 RX ORDER — PANTOPRAZOLE SODIUM 40 MG/1
40 TABLET, DELAYED RELEASE ORAL
Status: DISCONTINUED | OUTPATIENT
Start: 2024-04-27 | End: 2024-05-01 | Stop reason: HOSPADM

## 2024-04-26 RX ORDER — ONDANSETRON 4 MG/1
4 TABLET, ORALLY DISINTEGRATING ORAL EVERY 6 HOURS PRN
Status: CANCELLED | OUTPATIENT
Start: 2024-04-26

## 2024-04-26 RX ORDER — LAMOTRIGINE 25 MG/1
25 TABLET ORAL DAILY
Status: DISCONTINUED | OUTPATIENT
Start: 2024-04-27 | End: 2024-05-01 | Stop reason: HOSPADM

## 2024-04-26 RX ORDER — LACTULOSE 10 G/15ML
30 SOLUTION ORAL DAILY
Status: DISCONTINUED | OUTPATIENT
Start: 2024-04-27 | End: 2024-04-30

## 2024-04-26 RX ORDER — POTASSIUM CHLORIDE 20 MEQ/1
40 TABLET, EXTENDED RELEASE ORAL ONCE
Status: COMPLETED | OUTPATIENT
Start: 2024-04-26 | End: 2024-04-26

## 2024-04-26 RX ORDER — SPIRONOLACTONE 100 MG/1
100 TABLET, FILM COATED ORAL DAILY
Status: DISCONTINUED | OUTPATIENT
Start: 2024-04-27 | End: 2024-05-01 | Stop reason: HOSPADM

## 2024-04-26 RX ORDER — ACETAMINOPHEN 325 MG/1
650 TABLET ORAL EVERY 6 HOURS PRN
Status: DISCONTINUED | OUTPATIENT
Start: 2024-04-26 | End: 2024-05-01 | Stop reason: HOSPADM

## 2024-04-26 RX ORDER — LANOLIN ALCOHOL/MO/W.PET/CERES
6 CREAM (GRAM) TOPICAL
Status: DISCONTINUED | OUTPATIENT
Start: 2024-04-26 | End: 2024-05-01 | Stop reason: HOSPADM

## 2024-04-26 RX ORDER — POTASSIUM CHLORIDE 20 MEQ/1
40 TABLET, EXTENDED RELEASE ORAL ONCE
Status: CANCELLED | OUTPATIENT
Start: 2024-04-26

## 2024-04-26 RX ORDER — FOLIC ACID 1 MG/1
1 TABLET ORAL DAILY
Status: DISCONTINUED | OUTPATIENT
Start: 2024-04-27 | End: 2024-05-01 | Stop reason: HOSPADM

## 2024-04-26 RX ORDER — CARVEDILOL 6.25 MG/1
6.25 TABLET ORAL 2 TIMES DAILY WITH MEALS
Status: DISCONTINUED | OUTPATIENT
Start: 2024-04-27 | End: 2024-05-01 | Stop reason: HOSPADM

## 2024-04-26 RX ORDER — SODIUM CHLORIDE 9 MG/ML
75 INJECTION, SOLUTION INTRAVENOUS CONTINUOUS
Status: DISCONTINUED | OUTPATIENT
Start: 2024-04-26 | End: 2024-04-29

## 2024-04-26 RX ORDER — FUROSEMIDE 40 MG/1
40 TABLET ORAL DAILY
Status: DISCONTINUED | OUTPATIENT
Start: 2024-04-26 | End: 2024-05-01 | Stop reason: HOSPADM

## 2024-04-26 RX ORDER — MAGNESIUM HYDROXIDE/ALUMINUM HYDROXICE/SIMETHICONE 120; 1200; 1200 MG/30ML; MG/30ML; MG/30ML
30 SUSPENSION ORAL EVERY 6 HOURS PRN
Status: CANCELLED | OUTPATIENT
Start: 2024-04-26

## 2024-04-26 RX ORDER — ONDANSETRON 4 MG/1
4 TABLET, ORALLY DISINTEGRATING ORAL EVERY 6 HOURS PRN
Status: DISCONTINUED | OUTPATIENT
Start: 2024-04-26 | End: 2024-05-01 | Stop reason: HOSPADM

## 2024-04-26 RX ORDER — ACETAMINOPHEN 325 MG/1
650 TABLET ORAL EVERY 6 HOURS PRN
Status: CANCELLED | OUTPATIENT
Start: 2024-04-26

## 2024-04-26 RX ORDER — PHENOBARBITAL 64.8 MG/1
64.8 TABLET ORAL ONCE
Status: COMPLETED | OUTPATIENT
Start: 2024-04-26 | End: 2024-04-26

## 2024-04-26 RX ORDER — FLUOXETINE HYDROCHLORIDE 20 MG/1
20 CAPSULE ORAL DAILY
Status: DISCONTINUED | OUTPATIENT
Start: 2024-04-27 | End: 2024-04-27

## 2024-04-26 RX ORDER — ENOXAPARIN SODIUM 100 MG/ML
40 INJECTION SUBCUTANEOUS DAILY
Status: CANCELLED | OUTPATIENT
Start: 2024-04-27

## 2024-04-26 RX ORDER — ENOXAPARIN SODIUM 100 MG/ML
40 INJECTION SUBCUTANEOUS DAILY
Status: DISCONTINUED | OUTPATIENT
Start: 2024-04-27 | End: 2024-04-26

## 2024-04-26 RX ORDER — PHENOBARBITAL SODIUM 130 MG/ML
260 INJECTION, SOLUTION INTRAMUSCULAR; INTRAVENOUS ONCE
Status: COMPLETED | OUTPATIENT
Start: 2024-04-26 | End: 2024-04-26

## 2024-04-26 RX ADMIN — FUROSEMIDE 40 MG: 40 TABLET ORAL at 22:39

## 2024-04-26 RX ADMIN — MAGNESIUM SULFATE HEPTAHYDRATE 4 G: 40 INJECTION, SOLUTION INTRAVENOUS at 22:48

## 2024-04-26 RX ADMIN — PHENOBARBITAL 64.8 MG: 64.8 TABLET ORAL at 22:39

## 2024-04-26 RX ADMIN — THIAMINE HYDROCHLORIDE 500 MG: 100 INJECTION, SOLUTION INTRAMUSCULAR; INTRAVENOUS at 22:40

## 2024-04-26 RX ADMIN — SODIUM CHLORIDE 100 ML/HR: 0.9 INJECTION, SOLUTION INTRAVENOUS at 21:21

## 2024-04-26 RX ADMIN — POTASSIUM CHLORIDE 40 MEQ: 1500 TABLET, EXTENDED RELEASE ORAL at 21:17

## 2024-04-26 RX ADMIN — IOHEXOL 100 ML: 350 INJECTION, SOLUTION INTRAVENOUS at 14:28

## 2024-04-26 RX ADMIN — SODIUM CHLORIDE 500 ML: 0.9 INJECTION, SOLUTION INTRAVENOUS at 13:07

## 2024-04-26 RX ADMIN — PHENOBARBITAL SODIUM 260 MG: 130 INJECTION INTRAMUSCULAR at 21:17

## 2024-04-26 NOTE — EMTALA/ACUTE CARE TRANSFER
[facility] EMERGENCY DEPARTMENT  [address] [address]  Dept: [phone]      EMTALA TRANSFER CONSENT    NAME Curt Dominguez                                         1970                              MRN [id number]    I have been informed of my rights regarding examination, treatment, and transfer   by Dr. Yolanda Gtz,*    Benefits: Specialized equipment and/or services available at the receiving facility (Include comment)________________________    Risks: Potential for delay in receiving treatment, Potential deterioration of medical condition, Loss of IV, Increased discomfort during transfer      Consent for Transfer:  I acknowledge that my medical condition has been evaluated and explained to me by the emergency department physician or other qualified medical person and/or my attending physician, who has recommended that I be transferred to the service of  Accepting Physician: Dr. Roper at Accepting Facility Name, City & State : [facility]. The above potential benefits of such transfer, the potential risks associated with such transfer, and the probable risks of not being transferred have been explained to me, and I fully understand them.  The doctor has explained that, in my case, the benefits of transfer outweigh the risks.  I agree to be transferred.    I authorize the performance of emergency medical procedures and treatments upon me in both transit and upon arrival at the receiving facility.  Additionally, I authorize the release of any and all medical records to the receiving facility and request they be transported with me, if possible.  I understand that the safest mode of transportation during a medical emergency is an ambulance and that the Hospital advocates the use of this mode of transport. Risks of traveling to the receiving facility by car, including absence of medical control, life sustaining equipment, such as oxygen, and medical  personnel has been explained to me and I fully understand them.    (JUANJOSE CORRECT BOX BELOW)  [  ]  I consent to the stated transfer and to be transported by ambulance/helicopter.  [  ]  I consent to the stated transfer, but refuse transportation by ambulance and accept full responsibility for my transportation by car.  I understand the risks of non-ambulance transfers and I exonerate the Hospital and its staff from any deterioration in my condition that results from this refusal.    X___________________________________________    DATE  24  TIME________  Signature of patient or legally responsible individual signing on patient behalf           RELATIONSHIP TO PATIENT_________________________          Provider Certification    NAME Curt Dominguez                                         1970                              MRN [id number]    A medical screening exam was performed on the above named patient.  Based on the examination:    Condition Necessitating Transfer The primary encounter diagnosis was Alcohol intoxication (HCC). Diagnoses of Alcohol abuse and Fall, initial encounter were also pertinent to this visit.    Patient Condition: The patient has been stabilized such that within reasonable medical probability, no material deterioration of the patient condition or the condition of the unborn child(jayson) is likely to result from the transfer    Reason for Transfer: Level of Care needed not available at this facility    Transfer Requirements: Facility [facility] available and qualified personnel available for treatment as acknowledged by    Agreed to accept transfer and to provide appropriate medical treatment as acknowledged by       Dr. Roper  Appropriate medical records of the examination and treatment of the patient are provided at the time of transfer   STAFF INITIAL WHEN COMPLETED _______  Transfer will be performed by qualified personnel from    and appropriate transfer  equipment as required, including the use of necessary and appropriate life support measures.    Provider Certification: I have examined the patient and explained the following risks and benefits of being transferred/refusing transfer to the patient/family:  General risk, such as traffic hazards, adverse weather conditions, rough terrain or turbulence, possible failure of equipment (including vehicle or aircraft), or consequences of actions of persons outside the control of the transport personnel, Risk of worsening condition, Unanticipated needs of medical equipment and personnel during transport      Based on these reasonable risks and benefits to the patient and/or the unborn child(jayson), and based upon the information available at the time of the patient’s examination, I certify that the medical benefits reasonably to be expected from the provision of appropriate medical treatments at another medical facility outweigh the increasing risks, if any, to the individual’s medical condition, and in the case of labor to the unborn child, from effecting the transfer.    X____________________________________________ DATE 04/26/24        TIME_______      ORIGINAL - SEND TO MEDICAL RECORDS   COPY - SEND WITH PATIENT DURING TRANSFER

## 2024-04-26 NOTE — ED PROVIDER NOTES
"History  Chief Complaint   Patient presents with    Alcohol Intoxication     Patient suppose to got to rehab and last night was drinking and today found sitting on the floor of his apartment. He states he drank one and a half \"man cans of beer.\" Reports falling against the wall 3 days ago     The patient is a 53 year old male who presents ot the ED with the c/o ETOH intoxication, confusion, falls via EMS the patient states that he lives with a roommate who has been concerned over the last few days the patient has fallen multiple times.  The patient has refused medical evaluation.  The patient has a history of alcohol abuse and has been drinking daily.  Patient states that he struck his low back off of a wall a few days ago where there is bruising now.  He states he does not have any chest pain abdominal pain nausea vomiting dizziness headache blurred vision.  Patient was to the emergency department today after the roommate called for concern.  The patient is also per roommate confused.      Alcohol Problem  Timing:  Constant  Chronicity:  Recurrent  Associated symptoms: no chest pain and no congestion        Prior to Admission Medications   Prescriptions Last Dose Informant Patient Reported? Taking?   FLUoxetine (PROzac) 10 mg capsule   Yes No   Sig: Take 20 mg by mouth daily   Magnesium Oxide 400 MG CAPS   Yes No   Sig: Take 400 mg by mouth in the morning   carvedilol (COREG) 6.25 mg tablet   Yes No   Sig: Take 6.25 mg by mouth 2 (two) times a day with meals   folic acid (FOLVITE) 1 mg tablet   Yes No   Sig: Take 1 mg by mouth daily   furosemide (LASIX) 40 mg tablet   No No   Sig: Take 1 tablet (40 mg total) by mouth daily   lactulose 20 g/30 mL   Yes No   Sig: Take 30 g by mouth daily   lamoTRIgine (LaMICtal) 25 mg tablet   Yes No   Sig: Take 25 mg by mouth daily   omeprazole (PriLOSEC) 40 MG capsule   Yes No   Sig: Take 40 mg by mouth daily   rifaximin (XIFAXAN) 550 mg tablet   Yes No   Sig: Take 550 mg by mouth " in the morning   spironolactone (ALDACTONE) 100 mg tablet   No No   Sig: Take 1 tablet (100 mg total) by mouth daily      Facility-Administered Medications: None       Past Medical History:   Diagnosis Date    Acute respiratory failure with hypoxia (HCC) 03/20/2024    Alcohol abuse     Depression     Dysphagia     Fracture of one rib, left side, initial encounter for closed fracture     GERD (gastroesophageal reflux disease)     Hypertension 03/22/2024    Hypokalemia     Hyponatremia 03/20/2024    Lactic acid acidosis 03/20/2024    Liver failure (HCC)     Muscle wasting and atrophy, not elsewhere classified, multiple sites     Muscle weakness     SERGEI (obstructive sleep apnea)     Other disorders of bilirubin metabolism     Pleural effusion     Sacral fracture (HCC)     SIRS (systemic inflammatory response syndrome) (HCC) 03/20/2024    Splenic rupture        Past Surgical History:   Procedure Laterality Date    BRAIN SURGERY      IR CHEST TUBE PLACEMENT  3/20/2024       History reviewed. No pertinent family history.  I have reviewed and agree with the history as documented.    E-Cigarette/Vaping    E-Cigarette Use Never User      E-Cigarette/Vaping Substances     Social History     Tobacco Use    Smoking status: Never    Smokeless tobacco: Never   Vaping Use    Vaping status: Never Used   Substance Use Topics    Alcohol use: Yes     Comment: Every other day    Drug use: Not Currently       Review of Systems   HENT:  Negative for congestion.    Cardiovascular:  Negative for chest pain.   All other systems reviewed and are negative.      Physical Exam  Physical Exam  Vitals and nursing note reviewed.   Constitutional:       General: He is in acute distress.      Appearance: He is well-developed.   HENT:      Head: Normocephalic and atraumatic.      Mouth/Throat:      Mouth: Mucous membranes are dry.   Eyes:      Extraocular Movements: Extraocular movements intact.      Conjunctiva/sclera: Conjunctivae normal.       Pupils: Pupils are equal, round, and reactive to light.   Cardiovascular:      Rate and Rhythm: Regular rhythm. Tachycardia present.      Pulses: Normal pulses.      Heart sounds: No murmur heard.  Pulmonary:      Effort: Pulmonary effort is normal. No respiratory distress.      Breath sounds: Normal breath sounds.   Abdominal:      Palpations: Abdomen is soft.      Tenderness: There is no abdominal tenderness.   Musculoskeletal:         General: No swelling.      Cervical back: Normal range of motion and neck supple.   Skin:     General: Skin is warm and dry.      Capillary Refill: Capillary refill takes less than 2 seconds.   Neurological:      General: No focal deficit present.      Mental Status: He is alert.   Psychiatric:         Mood and Affect: Mood normal.         Vital Signs  ED Triage Vitals [04/26/24 1255]   Temperature Pulse Respirations Blood Pressure SpO2   (!) 96.4 °F (35.8 °C) 75 18 145/72 98 %      Temp Source Heart Rate Source Patient Position - Orthostatic VS BP Location FiO2 (%)   Temporal Monitor Sitting Right arm --      Pain Score       No Pain           Vitals:    04/26/24 1330 04/26/24 1400 04/26/24 1818 04/26/24 1915   BP: 142/67 135/65 124/74 118/67   Pulse: 76 79 83 82   Patient Position - Orthostatic VS:   Lying Sitting         Visual Acuity      ED Medications  Medications   sodium chloride 0.9 % bolus 500 mL (0 mL Intravenous Stopped 4/26/24 1407)   iohexol (OMNIPAQUE) 350 MG/ML injection (MULTI-DOSE) 100 mL (100 mL Intravenous Given 4/26/24 1428)       Diagnostic Studies  Results Reviewed       Procedure Component Value Units Date/Time    Lactic acid 2 Hours [[id number]]  (Abnormal) Collected: 04/26/24 1550    Lab Status: Final result Specimen: Blood from Arm, Left Updated: 04/26/24 1632     LACTIC ACID 2.3 mmol/L     Narrative:      Specimen Icteric.  Result may be elevated if tourniquet was used during collection.    Rapid drug screen, urine [[id number]]  (Normal) Collected: 04/26/24  1449    Lab Status: Final result Specimen: Urine, Catheter Updated: 04/26/24 1618     Amph/Meth UR Negative     Barbiturate Ur Negative     Benzodiazepine Urine Negative     Cocaine Urine Negative     Methadone Urine Negative     Opiate Urine Negative     PCP Ur Negative     THC Urine Negative     Oxycodone Urine Negative     Fentanyl Urine Negative     HYDROCODONE URINE Negative    Narrative:      FOR MEDICAL PURPOSES ONLY.   IF CONFIRMATION NEEDED PLEASE CONTACT THE LAB WITHIN 5 DAYS.    Drug Screen Cutoff Levels:  AMPHETAMINE/METHAMPHETAMINES  1000 ng/mL  BARBITURATES     200 ng/mL  BENZODIAZEPINES     200 ng/mL  COCAINE      300 ng/mL  METHADONE      300 ng/mL  OPIATES      300 ng/mL  PHENCYCLIDINE     25 ng/mL  THC       50 ng/mL  OXYCODONE      100 ng/mL  FENTANYL      5 ng/mL  HYDROCODONE     300 ng/mL    Urine Microscopic [[id number]]  (Normal) Collected: 04/26/24 1449    Lab Status: Final result Specimen: Urine, Clean Catch Updated: 04/26/24 1521     RBC, UA 1-2 /hpf      WBC, UA None Seen /hpf      Epithelial Cells None Seen /hpf      Bacteria, UA None Seen /hpf     HS Troponin I 2hr [[id number]]  (Normal) Collected: 04/26/24 1442    Lab Status: Final result Specimen: Blood from Arm, Left Updated: 04/26/24 1512     hs TnI 2hr 6 ng/L      Delta 2hr hsTnI 0 ng/L     UA w Reflex to Microscopic w Reflex to Culture [[id number]]  (Abnormal) Collected: 04/26/24 1449    Lab Status: Final result Specimen: Urine, Clean Catch Updated: 04/26/24 1506     Color, UA Barbara     Clarity, UA Clear     Specific Gravity, UA <=1.005     pH, UA 6.5     Leukocytes, UA Negative     Nitrite, UA Negative     Protein, UA Negative mg/dl      Glucose, UA Negative mg/dl      Ketones, UA Negative mg/dl      Urobilinogen, UA 4.0 E.U./dl      Bilirubin, UA Small     Occult Blood, UA Moderate    FLU/RSV/COVID - if FLU/RSV clinically relevant [[id number]]  (Normal) Collected: 04/26/24 1301    Lab Status: Final result Specimen: Nares from Nose  Updated: 04/26/24 1412     SARS-CoV-2 Negative     INFLUENZA A PCR Negative     INFLUENZA B PCR Negative     RSV PCR Negative    Narrative:      FOR PEDIATRIC PATIENTS - copy/paste COVID Guidelines URL to browser: [email or web address]    SARS-CoV-2 assay is a Nucleic Acid Amplification assay intended for the  qualitative detection of nucleic acid from SARS-CoV-2 in nasopharyngeal  swabs. Results are for the presumptive identification of SARS-CoV-2 RNA.    Positive results are indicative of infection with SARS-CoV-2, the virus  causing COVID-19, but do not rule out bacterial infection or co-infection  with other viruses. Laboratories within the [place] and its  territories are required to report all positive results to the appropriate  public health authorities. Negative results do not preclude SARS-CoV-2  infection and should not be used as the sole basis for treatment or other  patient management decisions. Negative results must be combined with  clinical observations, patient history, and epidemiological information.  This test has not been FDA cleared or approved.    This test has been authorized by FDA under an Emergency Use Authorization  (EUA). This test is only authorized for the duration of time the  declaration that circumstances exist justifying the authorization of the  emergency use of an in vitro diagnostic tests for detection of SARS-CoV-2  virus and/or diagnosis of COVID-19 infection under section 564(b)(1) of  the Act, 21 U.S.C. 360bbb-3(b)(1), unless the authorization is terminated  or revoked sooner. The test has been validated but independent review by FDA  and CLIA is pending.    Test performed using [employer] Software: This RT-PCR assay targets N2,  a region unique to SARS-CoV-2. A conserved region in the E-gene was chosen  for pan-Sarbecovirus detection which includes SARS-CoV-2.    According to CMS-2020-01-R, this platform meets the  definition of high-throughput technology.    Lactic acid, plasma (w/reflex if result > 2.0) [[id number]]  (Abnormal) Collected: 04/26/24 1301    Lab Status: Final result Specimen: Blood from Arm, Left Updated: 04/26/24 1355     LACTIC ACID 2.2 mmol/L     Narrative:      Specimen Icteric.  Result may be elevated if tourniquet was used during collection.    HS Troponin 0hr (reflex protocol) [[id number]]  (Normal) Collected: 04/26/24 1301    Lab Status: Final result Specimen: Blood from Arm, Left Updated: 04/26/24 1353     hs TnI 0hr 6 ng/L     B-Type Natriuretic Peptide(BNP) [[id number]]  (Abnormal) Collected: 04/26/24 1301    Lab Status: Final result Specimen: Blood from Arm, Left Updated: 04/26/24 1352      pg/mL     Comprehensive metabolic panel [[id number]]  (Abnormal) Collected: 04/26/24 1301    Lab Status: Final result Specimen: Blood from Arm, Left Updated: 04/26/24 1350     Sodium 139 mmol/L      Potassium 3.4 mmol/L      Chloride 104 mmol/L      CO2 28 mmol/L      ANION GAP 7 mmol/L      BUN 4 mg/dL      Creatinine 0.67 mg/dL      Glucose 98 mg/dL      Calcium 7.7 mg/dL      Corrected Calcium 8.9 mg/dL      AST 42 U/L      ALT 17 U/L      Alkaline Phosphatase 87 U/L      Total Protein 5.9 g/dL      Albumin 2.5 g/dL      Total Bilirubin 7.73 mg/dL      eGFR 111 ml/min/1.73sq m     Narrative:      Specimen Icteric.  National Kidney Disease Foundation guidelines for Chronic Kidney Disease (CKD):     Stage 1 with normal or high GFR (GFR > 90 mL/min/1.73 square meters)    Stage 2 Mild CKD (GFR = 60-89 mL/min/1.73 square meters)    Stage 3A Moderate CKD (GFR = 45-59 mL/min/1.73 square meters)    Stage 3B Moderate CKD (GFR = 30-44 mL/min/1.73 square meters)    Stage 4 Severe CKD (GFR = 15-29 mL/min/1.73 square meters)    Stage 5 End Stage CKD (GFR <15 mL/min/1.73 square meters)  Note: GFR calculation is accurate only with a steady state creatinine    Lipase [[id number]]  (Normal) Collected: 04/26/24 1301    Lab  Status: Final result Specimen: Blood from Arm, Left Updated: 04/26/24 1350     Lipase 47 u/L     Narrative:      Specimen Icteric.    Ethanol [[id number]]  (Abnormal) Collected: 04/26/24 1301    Lab Status: Final result Specimen: Blood from Arm, Left Updated: 04/26/24 1349     Ethanol Lvl 362 mg/dL     Narrative:      Specimen Icteric.    Ammonia [[id number]]  (Normal) Collected: 04/26/24 1301    Lab Status: Final result Specimen: Blood from Arm, Left Updated: 04/26/24 1349     Ammonia 57 umol/L     Narrative:      Specimen Icteric.    Protime-INR [[id number]]  (Abnormal) Collected: 04/26/24 1301    Lab Status: Final result Specimen: Blood from Arm, Left Updated: 04/26/24 1348     Protime 24.4 seconds      INR 2.15    APTT [[id number]]  (Abnormal) Collected: 04/26/24 1301    Lab Status: Final result Specimen: Blood from Arm, Left Updated: 04/26/24 1348     PTT 43 seconds     CBC and differential [[id number]]  (Abnormal) Collected: 04/26/24 1301    Lab Status: Final result Specimen: Blood from Arm, Left Updated: 04/26/24 1334     WBC 6.01 Thousand/uL      RBC 3.32 Million/uL      Hemoglobin 10.9 g/dL      Hematocrit 32.2 %      MCV 97 fL      MCH 32.8 pg      MCHC 33.9 g/dL      RDW 13.9 %      MPV 9.4 fL      Platelets 94 Thousands/uL      nRBC 0 /100 WBCs      Segmented % 42 %      Immature Grans % 0 %      Lymphocytes % 34 %      Monocytes % 13 %      Eosinophils Relative 10 %      Basophils Relative 1 %      Absolute Neutrophils 2.51 Thousands/µL      Absolute Immature Grans 0.02 Thousand/uL      Absolute Lymphocytes 2.07 Thousands/µL      Absolute Monocytes 0.76 Thousand/µL      Eosinophils Absolute 0.58 Thousand/µL      Basophils Absolute 0.07 Thousands/µL                    CT head without contrast   Final Result by Alexis Meyer MD (04/26 1455)      No acute intracranial abnormality.                  Workstation performed: [id number]         CT chest abdomen pelvis w contrast   Final Result by Alexis Meyer MD  (04/26 1520)      1. No evidence of acute traumatic injury in the chest abdomen or pelvis   2. Large right-sided effusion, but diminished from prior CT. No pneumothorax   3. Hepatic cirrhosis and evidence of portal hypertension. Probable cavernous transformation of the main portal vein without evidence of acute thrombosis   4. Splenomegaly with deformed shape and irregular hypodensities likely related to history of prior trauma/prior embolization procedure with areas of splenic infarction. No definite evidence of acute traumatic injury   5. 2 cm posterior back subcutaneous hematoma without evidence of active extravasation   6. Cholelithiasis within a mildly dilated gallbladder but without specific pericholecystic inflammatory change               Workstation performed: [id number]         CT cervical spine without contrast   Final Result by Alexis Meyer MD (04/26 1526)      No cervical spine fracture or traumatic malalignment.                  Workstation performed: [id number]         CT recon only thoracolumbar   Final Result by Alexis Meyer MD (04/26 1527)      No acute fracture or traumatic subluxation.      Posterior back subcutaneous hematoma without evidence of active extravasation               Workstation performed: [id number]                    Procedures  ECG 12 Lead Documentation Only    Date/Time: 4/26/2024 2:30 PM    Performed by: En Camp PA-C  Authorized by: En Camp PA-C    Indications / Diagnosis:  Confusion  ECG reviewed by me, the ED Provider: yes    Patient location:  ED  Previous ECG:     Previous ECG:  Unavailable  Interpretation:     Interpretation: non-specific    Rate:     ECG rate:  77    ECG rate assessment: normal    Rhythm:     Rhythm: sinus rhythm    ST segments:     ST segments:  Non-specific           ED Course  ED Course as of 04/26/24 1930 Fri Apr 26, 2024   1355 LACTIC ACID(!!): 2.2   1541 Reaching out to Dr. Roper from Detox  at [facility]  "facility      1616 Accepted to medical detox unit                                SBIRT 22yo+      Flowsheet Row Most Recent Value   Initial Alcohol Screen: US AUDIT-C     1. How often do you have a drink containing alcohol? 6 Filed at: 04/26/2024 1253   2. How many drinks containing alcohol do you have on a typical day you are drinking?  2  [\"man cans\" can be up to 30 oz each] Filed at: 04/26/2024 1253   3a. Male UNDER 65: How often do you have five or more drinks on one occasion? 5 Filed at: 04/26/2024 1253   Audit-C Score 13 Filed at: 04/26/2024 1253                      Medical Decision Making  The patient is a 53 year old male who presents ot the ED with the c/o ETOH intoxication, confusion, falls via EMS the patient states that he lives with a roommate who has been concerned over the last few days the patient has fallen multiple times.  The patient has refused medical evaluation.  The patient has a history of alcohol abuse and has been drinking daily.  Patient states that he struck his low back off of a wall a few days ago where there is bruising now.  He states he does not have any chest pain abdominal pain nausea vomiting dizziness headache blurred vision.  Patient was to the emergency department today after the roommate called for concern.  The patient is also per roommate confused.    Patient on examination slightly confused today's events but this did improve throughout the ER stay.  Patient's alcohol level was elevated.  Patient otherwise noted to have slightly elevated BNP.  No traumatic injuries were found.  Discussed with patient at bedside and in agreement with transfer to [facility] for inpatient medical detox.  The patient's case was discussed with Dr. Roper and accepted for medical detox.  Patient was transferred      Patient was in agreement treatment plan    Differential diagnosis included but was not limited to :Pneumonia, Sinusitis, URI, ACS, GERD, dehydration, kidney disease, " traumatic injury, alcohol intoxication, withdrawal, drug use    Amount and/or Complexity of Data Reviewed  Labs: ordered. Decision-making details documented in ED Course.  Radiology: ordered and independent interpretation performed. Decision-making details documented in ED Course.  ECG/medicine tests: ordered and independent interpretation performed. Decision-making details documented in ED Course.  Discussion of management or test interpretation with external provider(s): Dr. Judson Cunningham  Prescription drug management.             Disposition  Final diagnoses:   Alcohol intoxication (HCC)   Alcohol abuse   Fall, initial encounter     Time reflects when diagnosis was documented in both MDM as applicable and the Disposition within this note       Time User Action Codes Description Comment    4/26/2024  4:13 PM En Camp [F10.929] Alcohol intoxication (HCC)     4/26/2024  4:13 PM En Camp [F10.10] Alcohol abuse     4/26/2024  4:14 PM En Camp [W19.XXXA] Fall, initial encounter           ED Disposition       ED Disposition   Transfer to Another Facility-In Network    Condition   --    Date/Time   Fri Apr 26, 2024 1614    Comment   Curt Dominguez should be transferred out to  [facility] .               MD Documentation      Flowsheet Row Most Recent Value   Patient Condition The patient has been stabilized such that within reasonable medical probability, no material deterioration of the patient condition or the condition of the unborn child(jayson) is likely to result from the transfer   Reason for Transfer Level of Care needed not available at this facility   Benefits of Transfer Specialized equipment and/or services available at the receiving facility (Include comment)________________________   Risks of Transfer Potential for delay in receiving treatment, Potential deterioration of medical condition, Loss of IV, Increased discomfort during transfer   Accepting Physician Dr. Roper    Accepting Facility Name, [facility]   Sending MD En Camp PA-C   Provider Certification General risk, such as traffic hazards, adverse weather conditions, rough terrain or turbulence, possible failure of equipment (including vehicle or aircraft), or consequences of actions of persons outside the control of the transport personnel, Risk of worsening condition, Unanticipated needs of medical equipment and personnel during transport          RN Documentation      Flowsheet Row Most Recent Value   Accepting Facility Name, [facility]          Follow-up Information    None         Patient's Medications   Discharge Prescriptions    No medications on file       No discharge procedures on file.    PDMP Review       None            ED Provider  Electronically Signed by             En Camp PA-C  04/26/24 6499

## 2024-04-26 NOTE — ED ATTENDING ATTESTATION
4/26/2024  IYolanda DO, saw and evaluated the patient. I have discussed the patient with the resident/non-physician practitioner and agree with the resident's/non-physician practitioner's findings, Plan of Care, and MDM as documented in the resident's/non-physician practitioner's note, except where noted. All available labs and Radiology studies were reviewed.  I was present for key portions of any procedure(s) performed by the resident/non-physician practitioner and I was immediately available to provide assistance.       At this point I agree with the current assessment done in the Emergency Department.  I have conducted an independent evaluation of this patient a history and physical is as follows:    ED Course       Patient is a 53-year-old male presenting to the ED for evaluation of alcohol intoxication.  Seen and evaluated with the midlevel provider.  Patient has been accepted to the [facility] for alcohol dependence.  Stable ED course.    Critical Care Time  Procedures

## 2024-04-27 PROBLEM — D64.9 NORMOCYTIC ANEMIA, NOT DUE TO BLOOD LOSS: Status: ACTIVE | Noted: 2024-04-27

## 2024-04-27 PROBLEM — R94.31 PROLONGED Q-T INTERVAL ON ECG: Status: ACTIVE | Noted: 2024-04-27

## 2024-04-27 LAB
ALBUMIN SERPL BCP-MCNC: 2 G/DL (ref 3.5–5)
ALP SERPL-CCNC: 75 U/L (ref 34–104)
ALT SERPL W P-5'-P-CCNC: 14 U/L (ref 7–52)
ANION GAP SERPL CALCULATED.3IONS-SCNC: 9 MMOL/L (ref 4–13)
AST SERPL W P-5'-P-CCNC: 40 U/L (ref 13–39)
ATRIAL RATE: 87 BPM
BILIRUB SERPL-MCNC: 7.13 MG/DL (ref 0.2–1)
BUN SERPL-MCNC: 5 MG/DL (ref 5–25)
CALCIUM ALBUM COR SERPL-MCNC: 8.9 MG/DL (ref 8.3–10.1)
CALCIUM SERPL-MCNC: 7.3 MG/DL (ref 8.4–10.2)
CHLORIDE SERPL-SCNC: 106 MMOL/L (ref 96–108)
CK SERPL-CCNC: 75 U/L (ref 39–308)
CO2 SERPL-SCNC: 25 MMOL/L (ref 21–32)
CREAT SERPL-MCNC: 0.69 MG/DL (ref 0.6–1.3)
ERYTHROCYTE [DISTWIDTH] IN BLOOD BY AUTOMATED COUNT: 13.9 % (ref 11.6–15.1)
FERRITIN SERPL-MCNC: 172 NG/ML (ref 24–336)
GFR SERPL CREATININE-BSD FRML MDRD: 109 ML/MIN/1.73SQ M
GLUCOSE SERPL-MCNC: 86 MG/DL (ref 65–140)
HCT VFR BLD AUTO: 27.7 % (ref 36.5–49.3)
HGB BLD-MCNC: 9.6 G/DL (ref 12–17)
INR PPP: 2.31 (ref 0.84–1.19)
IRON SERPL-MCNC: 134 UG/DL (ref 50–212)
LACTATE SERPL-SCNC: 2.2 MMOL/L (ref 0.5–2)
LACTATE SERPL-SCNC: 2.4 MMOL/L (ref 0.5–2)
LACTATE SERPL-SCNC: 2.6 MMOL/L (ref 0.5–2)
MAGNESIUM SERPL-MCNC: 2.3 MG/DL (ref 1.9–2.7)
MCH RBC QN AUTO: 33.8 PG (ref 26.8–34.3)
MCHC RBC AUTO-ENTMCNC: 34.7 G/DL (ref 31.4–37.4)
MCV RBC AUTO: 98 FL (ref 82–98)
P AXIS: 49 DEGREES
PLATELET # BLD AUTO: 77 THOUSANDS/UL (ref 149–390)
PMV BLD AUTO: 9.5 FL (ref 8.9–12.7)
POTASSIUM SERPL-SCNC: 3.5 MMOL/L (ref 3.5–5.3)
PR INTERVAL: 148 MS
PROT SERPL-MCNC: 4.9 G/DL (ref 6.4–8.4)
PROTHROMBIN TIME: 25.7 SECONDS (ref 11.6–14.5)
QRS AXIS: 40 DEGREES
QRSD INTERVAL: 96 MS
QT INTERVAL: 428 MS
QTC INTERVAL: 515 MS
RBC # BLD AUTO: 2.84 MILLION/UL (ref 3.88–5.62)
SODIUM SERPL-SCNC: 140 MMOL/L (ref 135–147)
T WAVE AXIS: 10 DEGREES
UIBC SERPL-MCNC: <55 UG/DL (ref 155–355)
VENTRICULAR RATE: 87 BPM
WBC # BLD AUTO: 5.47 THOUSAND/UL (ref 4.31–10.16)

## 2024-04-27 PROCEDURE — 93005 ELECTROCARDIOGRAM TRACING: CPT

## 2024-04-27 PROCEDURE — 85027 COMPLETE CBC AUTOMATED: CPT

## 2024-04-27 PROCEDURE — 83550 IRON BINDING TEST: CPT

## 2024-04-27 PROCEDURE — 83605 ASSAY OF LACTIC ACID: CPT

## 2024-04-27 PROCEDURE — 82550 ASSAY OF CK (CPK): CPT

## 2024-04-27 PROCEDURE — 83540 ASSAY OF IRON: CPT

## 2024-04-27 PROCEDURE — 99233 SBSQ HOSP IP/OBS HIGH 50: CPT | Performed by: EMERGENCY MEDICINE

## 2024-04-27 PROCEDURE — 83735 ASSAY OF MAGNESIUM: CPT

## 2024-04-27 PROCEDURE — NC001 PR NO CHARGE: Performed by: EMERGENCY MEDICINE

## 2024-04-27 PROCEDURE — 82728 ASSAY OF FERRITIN: CPT

## 2024-04-27 PROCEDURE — 85610 PROTHROMBIN TIME: CPT

## 2024-04-27 PROCEDURE — 80053 COMPREHEN METABOLIC PANEL: CPT

## 2024-04-27 PROCEDURE — 93010 ELECTROCARDIOGRAM REPORT: CPT | Performed by: INTERNAL MEDICINE

## 2024-04-27 RX ORDER — PHENOBARBITAL SODIUM 130 MG/ML
130 INJECTION, SOLUTION INTRAMUSCULAR; INTRAVENOUS ONCE
Status: COMPLETED | OUTPATIENT
Start: 2024-04-27 | End: 2024-04-27

## 2024-04-27 RX ORDER — PHENOBARBITAL 64.8 MG/1
64.8 TABLET ORAL ONCE
Status: COMPLETED | OUTPATIENT
Start: 2024-04-27 | End: 2024-04-27

## 2024-04-27 RX ORDER — MAGNESIUM SULFATE HEPTAHYDRATE 40 MG/ML
2 INJECTION, SOLUTION INTRAVENOUS ONCE
Status: COMPLETED | OUTPATIENT
Start: 2024-04-27 | End: 2024-04-27

## 2024-04-27 RX ADMIN — FUROSEMIDE 40 MG: 40 TABLET ORAL at 08:23

## 2024-04-27 RX ADMIN — PHENOBARBITAL 64.8 MG: 64.8 TABLET ORAL at 16:39

## 2024-04-27 RX ADMIN — THIAMINE HYDROCHLORIDE 500 MG: 100 INJECTION, SOLUTION INTRAMUSCULAR; INTRAVENOUS at 21:18

## 2024-04-27 RX ADMIN — PHENOBARBITAL 64.8 MG: 64.8 TABLET ORAL at 14:43

## 2024-04-27 RX ADMIN — SPIRONOLACTONE 100 MG: 100 TABLET, FILM COATED ORAL at 08:26

## 2024-04-27 RX ADMIN — THIAMINE HYDROCHLORIDE 500 MG: 100 INJECTION, SOLUTION INTRAMUSCULAR; INTRAVENOUS at 05:00

## 2024-04-27 RX ADMIN — CARVEDILOL 6.25 MG: 6.25 TABLET, FILM COATED ORAL at 16:39

## 2024-04-27 RX ADMIN — MELATONIN TAB 3 MG 6 MG: 3 TAB at 01:56

## 2024-04-27 RX ADMIN — LAMOTRIGINE 25 MG: 25 TABLET ORAL at 08:23

## 2024-04-27 RX ADMIN — RIFAXIMIN 550 MG: 550 TABLET ORAL at 08:26

## 2024-04-27 RX ADMIN — PHENOBARBITAL 64.8 MG: 64.8 TABLET ORAL at 01:56

## 2024-04-27 RX ADMIN — MAGNESIUM SULFATE HEPTAHYDRATE 2 G: 2 INJECTION, SOLUTION INTRAVENOUS at 13:06

## 2024-04-27 RX ADMIN — PHENOBARBITAL SODIUM 130 MG: 130 INJECTION INTRAMUSCULAR at 11:43

## 2024-04-27 RX ADMIN — CARVEDILOL 6.25 MG: 6.25 TABLET, FILM COATED ORAL at 08:23

## 2024-04-27 RX ADMIN — SODIUM CHLORIDE 75 ML/HR: 0.9 INJECTION, SOLUTION INTRAVENOUS at 13:06

## 2024-04-27 RX ADMIN — MULTIPLE VITAMINS W/ MINERALS TAB 1 TABLET: TAB ORAL at 08:23

## 2024-04-27 RX ADMIN — FOLIC ACID 1 MG: 1 TABLET ORAL at 08:23

## 2024-04-27 RX ADMIN — THIAMINE HYDROCHLORIDE 500 MG: 100 INJECTION, SOLUTION INTRAMUSCULAR; INTRAVENOUS at 14:37

## 2024-04-27 RX ADMIN — PHENOBARBITAL SODIUM 130 MG: 130 INJECTION INTRAMUSCULAR at 08:23

## 2024-04-27 RX ADMIN — LACTULOSE 30 G: 20 SOLUTION ORAL at 08:23

## 2024-04-27 RX ADMIN — PHENOBARBITAL 64.8 MG: 64.8 TABLET ORAL at 04:39

## 2024-04-27 RX ADMIN — PANTOPRAZOLE SODIUM 40 MG: 40 TABLET, DELAYED RELEASE ORAL at 05:00

## 2024-04-27 NOTE — ASSESSMENT & PLAN NOTE
Patient with a history of HTN  Home medication regimen: Coreg BID, spironolactone and Lasix daily  BP hypertensive at 150/106 upon arrival to the unit in the setting of acute alcohol withdrawal and chronic HTN  Most Recent Blood Pressure: 130/75    Will continue home medications given multiple medical comorbidities  Continue monitoring BP

## 2024-04-27 NOTE — H&P
"[facility]  H&P  Name: Curt Dominguez 53 y.o. male I MRN: [id number]  Unit/Bed#: 5T DETOX 504-01 I Date of Admission: 4/26/2024   Date of Service: 4/26/2024 I Hospital Day: 0    HISTORY & PHYSICAL EXAM  DEPARTMENT OF MEDICAL TOXICOLOGY  LEVEL 4 MEDICAL DETOX UNIT  Curt Dominguez 53 y.o. male MRN: [id number]  Unit/Bed#: 5T DETOX 504-01 Encounter: [id number]      Reason for Admission/Principal Problem: Ethanol withdrawal, Ethanol use disorder  Admitting Provider: Bela Shields PA-C  Attending Provider: Agustín Roper DO   4/26/2024  8:24 PM        * Alcohol withdrawal syndrome with complication (HCC)  Assessment & Plan  Patient with a history of chronic daily alcohol use  Last drink 4/26 around 1100  Serum alcohol 362 in the ED (4/26, 1301)  Initiate SEWS protocol for medical management of alcohol withdrawal  Current alcohol withdrawal signs/symptoms include anxiety, tremor, HTN  SEWS score 8 upon admission  Administer 260 mg of phenobarbital as initial dose  Continue monitoring under protocol and administer phenobarbital as indicated  Continuous pulse ox and telemetry monitoring     Alcohol use disorder, severe, dependence (HCC)  Assessment & Plan  Pt with a h/o chronic heavy alcohol use with intermittent period of sobriety over the years  Currently drinks 3 large cans (24 oz) of beer daily  Denies H/o withdrawal seizures  Interested in Vivitrol injections at this time as long as liver function is acceptable  Withdrawal management as above  Initiate IVFs, high dose IV thiamine, folic acid, and MVI  Consult case management/CRS for assistance with aftercare resources - pt interested in IP rehabilitation at this time     Alcoholic cirrhosis (HCC)  Assessment & Plan  Pt with a h/o alcoholic cirrhosis  CT chest/abd/pelvis 4/26 revealed, \"Hepatic cirrhosis and evidence of portal hypertension. Probable cavernous transformation of the main portal vein without evidence of acute " "thrombosis\"  Recent RUQ U/S 3/21/24 with findings also suggestive of portal HTN  CMP on admission revealed mildly elevated AST 42, hypoalbuminemia (alb 2.5) and elevated total bilirubin 7.73  Direct bilirubin pending  Elevated PT/INR and PTT also POA  Reviewed recent GI consult note from 3/21/24 from previous admission:  \"Child Class C  Life Expectancy :  1-3 years  Abdominal surgery thomas-operative mortality: 82%\"  Pleural effusion likely transudative in origin and due to hepatic hydrothorax; however, pt poor candidate for TIPS procedure due to h/o hepatic encephalopathy  Will continue their recommendations including home medications of spironolactone, Lasix, lactulose, and Rifaximin, as well as low-salt diet  Calculated MELD-Na score 23 on this admission  Estimated 90-day mortality 14-15%  Continue to monitor CMP and PT/INR in AM  SLIM consulted at this time due to pt's medical complexity, will appreciate recommendations  May also consider repeat GI consult   Encourage alcohol cessation   Recommend outpatient follow-up GI for ongoing monitoring/maintenance     Hypertension  Assessment & Plan  Patient with a history of HTN  Home medication regimen: Coreg BID, spironolactone and Lasix daily  BP hypertensive at 150/106 upon arrival to the unit in the setting of acute alcohol withdrawal and chronic HTN  Most Recent Blood Pressure: 122/82    Will continue home medications given multiple medical comorbidities  Continue monitoring BP     Pleural effusion, right  Assessment & Plan  Pt with a h/o recent R pleural effusion with hospitalization 3/20-3/29/24 requiring chest tube from 3/20-3/24 with additional 2 L pleural fluid drained by CC team  R pleural effusion was re-demonstrated on CT C/A/P 4/26 but noted to be smaller in size compared to previous CT  Currently denies SOB, HODGSON, or CP  VSS, SpO2 97%  on RA  Continue to monitor VS, respiratory status    Bipolar depression (HCC)  Assessment & Plan  Patient with a h/o " bipolar disorder  Home medications include Prozac and Lamictal daily  Denies SI/HI/thoughts of self harm  Continue home medications  Recommend OP f/u with PCP/OP Psychiatry     GERD (gastroesophageal reflux disease)  Assessment & Plan  Continue daily PPI     SERGEI (obstructive sleep apnea)  Assessment & Plan  Patient is not currently using CPAP at home  Will monitor on continuous pulse oximetry at this time  Recommend f/u for sleep study outpatient     Ambulatory dysfunction  Assessment & Plan  Pt with a h/o ambulatory dysfunction and frequent falls at home  Currently requiring assistance x1 with ambulation to the restroom, mild finger-to-nose ataxia present on exam  Initiate high dose thiamine Q8H x9 doses  PT eval and treat   Fall precautions in place    Thrombocytopenia (HCC)  Assessment & Plan  PTL 94 on admission  In the setting of chronic alcohol use  Daily thiamine/folic acid supplementation  Will hold Lovenox given PTL <100; SCDs currently in place  Continue monitoring CBC  Encourage alcohol cessation     Elevated INR  Assessment & Plan  INR 2.15 on admission  In light of alcoholic cirrhosis   Repeat PT/INR in AM    Serum total bilirubin elevated  Assessment & Plan  Total bilirubin 7.73 -> 7.01 on admission   Direct bilirubin 2.52  In light of alcoholic cirrhosis   Will continue to monitor at this time    Lactic acidosis  Assessment & Plan  Elevated lactic acid 2.2 -> 2.3 in the ED  Repeat lactic acid on admission was 2.1  Possibly 2/2 alcohol metabolism and hepatic impairment d/t cirrhosis   IVF hydration   Will continue to trend lactic acid at this time    Hypokalemia  Assessment & Plan  Mild, with K 3.4 on initial labs  Repleted with KCl 40 mEq PO on admission  Continue monitoring and replete electrolytes as indicated     Hypomagnesemia  Assessment & Plan  Mag 1.5 on admission  Repleting with IV magnesium sulfate 4 g  Continue monitoring and replete electrolytes as indicated     Normocytic anemia, not due  to blood loss  Assessment & Plan  Lab Results   Component Value Date    WBC 6.01 04/26/2024    HGB 10.9 (L) 04/26/2024    HCT 32.2 (L) 04/26/2024    MCV 97 04/26/2024    PLT 94 (L) 04/26/2024      Possibly 2/2 iron deficiency/malnutrition v. Anemia of chronic disease given hx of cirrhosis  Iron panel pending - will initiate supplementation if indicated  Daily multivitamin supplementation  Continue monitoring CBC and transfuse if indicated     Prolonged Q-T interval on ECG  Assessment & Plan  EKG in the ED revealed QTc 491 ms  Repeat EKG in the AM  Avoid QT prolonging agents  Telemetry monitoring during acute withdrawal                VTE Prophylaxis: Pharmacologic VTE Prophylaxis contraindicated due to thrombocytopenia   / sequential compression device   Code Status: full code      Anticipated Length of Stay:  Patient will be admitted on an Inpatient basis with an anticipated length of stay of  >2  midnights.   Justification for Hospital Stay: alcohol withdrawal, alcohol use disorder, alcoholic cirrhosis     For any questions or concerns, please Tiger Text the advanced practitioner in the role of [facility]-DETOX-AP On Call      This patient qualifies for Level IV medically managed intensive inpatient services under the criteria set by the American Society of Addiction Medicine, including dimensions 1-3. The patient is in withdrawal (or is intoxicated with high risk of withdrawal), with severe and unstable medical and/or psychiatric (dual diagnosis) problems, requiring requires 24-hour medical and nursing care and the full resources of a licensed hospital.          SEWS PHENOBARBITAL PROTOCOL FOR ALCOHOL WITHDRAWAL    Admit patient to medical detox unit and continue supportive care and stabilization of acute ethanol withdrawal per medical toxicology/detox treatment pathway. Monitor ethanol withdrawal severity via the Severity of Ethanol Withdrawal Scale (SEWS) Q4 hours and then hourly if/when SEWS > 6. Treat withdrawal  per pathway and reassess Q30-60 minutes.           Mild SEWS Score 1-6  Administer medications* (IV or PO; PO preferred):  If initial SEWS score: diazepam 10mg PO/IV x 1 AND phenobarbital 65 mg PO/IV x 1  If repeat SEWS score 1-6: phenobarbital 65 mg PO/IV q1 hour x 5 doses maximum   Reassessment:   SEWS q1 hour after each dose until SEWS 0 x 2 hours  VS q1 hours (until SEWS 0, then q4 hours)  Notify provider for bedside evaluation if 5-dose maximum is reached, RASS of -3 to -5, or SEWS score escalates to moderate or severe.   Moderate SEWS Score 7-12  Administer medications* (IV):  If initial SEWS score: diazepam 10mg IV x 1 AND phenobarbital 260 mg IV x 1  If repeat SEWS score 7-12 or score escalated from mild: phenobarbital 130 mg IV q30 minutes x 5 doses maximum   Reassessment:  SEWS q30 minutes after each dose until SEWS < 7 (then hourly until SEWS 0 x 2 hours)  VS q30 minutes until SEWS < 7 (then hourly until SEWS 0, then q4 hours)  Notify provider for bedside evaluation if 5-dose maximum is reached, RASS of -3 to -5, or SEWS score escalates to severe.   Severe SEWS Score ? 13  Administer medications* (IV):  If initial SEWS score: Diazepam 10 mg IV x 1 AND phenobarbital 650 mg IV piggyback x 1 over 15-30 minutes  If repeat SEWS score ? 13 or score escalated from mild or moderate: phenobarbital 130 mg IV q30 minutes x 5 doses maximum   Reassessment:  SEWS q30 minutes after each dose until SEWS < 7 (then hourly until SEWS 0 x 2 hours)   VS q30 minutes until SEWS < 7 (then hourly until SEWS 0, then q4 hours)  Notify provider for bedside evaluation if 5-dose maximum is reached or RASS of -3 to -5   *Hold medications and notify provider if CNS depression, respirations < 10/min, or RASS of -3 to -5.         Medications to be administered adjunctively if more than 2 grams of phenobarbital is needed for stabilization of withdrawal; require attending approval.   Dexmedetomidine infusion 0.1-1mcg/kg/hr IV infusion,  titratable to reduced agitation (Goal: RASS -2)  Ketamine   Acute agitated delirium: 1-2 mg/kg IV or 4-5 mg/kg IM  Refractory withdrawal: 0.1-1mg/kg/hr IV infusion, titratable to reduced agitation (Goal: RASS -2)    Further evaluation, screening and treatment:  Evaluate complete metabolic panel, transaminases, INR, and lipase. Assess hepatic ultrasound for any sign of alcoholic liver disease or cirrhosis, and ultimately refer for further hepatic evaluation and care as/if indicated.    Additional medications for ethanol associated malnutrition:  Thiamine 100 mg IV daily, increase to 500 mg TID for signs/symptoms of Wernicke's Encephalopathy or Wernicke Korsakoff Syndrome   Folic acid 1 mg IV daily   Multivitamin PO daily      Will offer first monthly injection of Naltrexone 380 mg IM, once patient is stabilized, as it has been shown to assist in decreasing cravings for ethanol.     Evaluate and treat for coexisting substance use, such as opioids and nicotine. Discuss risk factors for infectious disease, such as history of intravenous drug abuse, and offer hepatitis and HIV screening if indicated.     Case management consultation to assist with coordination of subsequent treatment after discharge.          Hx and PE limited by: none     HPI: Curt Dominguez is a 53 y.o. year old male with a PMH of AUD, alcoholic cirrhosis, HTN, GERD, recent R pleural effusion s/p chest tube 3/20-3/24/24, bipolar depression, SERGEI currently noncompliant with CPAP, and ambulatory dysfunction who presents with alcohol withdrawal seeking detoxification. Patient initially presented to the [facility] ED requesting alcohol detox  and was subsequently transferred to the [facility] medical detox unit for medical management of alcohol withdrawal. Pt reports that he was supposed to go to inpatient alcohol rehabilitation this AM but drank 1.5 beers and was walking around his apartment with unsteady gait so his roommate called 911 for EMS to bring him to the  "ED for evaluation. He underwent medical workup in the ED which was significant for mild lactic acidosis as well as multiple laboratory abnormalities consistent with his hx of alcoholic cirrhosis, and imaging including a negative CT head/C-spine and significant finding of \"Posterior back subcutaneous hematoma without evidence of active extravasation\" along with chronic processes but no acute abnormalities on CT C/A/P.     He reports currently drinking 3 large cans (about 24 oz) of beer daily, and his last drink was today around 1100. Serum alcohol was 362 in the ED this afternoon at 1301. He reports current alcohol withdrawal sx of anxiety, insomnia, and tremor. Denies N/V, diaphoresis, H/A, AVH, or SI/HI. Patient denies history of withdrawal seizures or any other substance use. Admits to a history of chronic alcohol use for multiple years with intermittent periods of sobriety, with the longest lasting \"1 year and 1 month\" just prior to the COVID-19 pandemic. He denies history of prior naltrexone therapy and is interested in trying Vivitrol  injections for ongoing treatment of AUD, so long as it does not interact with any of his other home medications and will not cause any further damage to his liver. Patient reports interest in inpatient rehabilitation after discharge.     Preferred alcoholic beverage(s): beer  Quantity and frequency of alcohol intake: 3 large cans daily  Use of any ethanol substitutes (toxic alcohols): no  Date/Time of last alcohol intake: today at 1100  Current signs and symptoms of ethanol withdrawal: anxiety, insomnia, tremor, and hypertension    SEWS Total Score: 8 (4/26/2024 8:48 PM)          Ethanol Withdrawal History  Previous ethanol withdrawal? yes  Prior inpatient treatment for ethanol withdrawal? yes  Prior outpatient treatment for ethanol withdrawal? yes  History of seizures with prior ethanol withdrawal? no  Prior treatment with naltrexone (Vivitrol)? no  Current treatment with " "naltrexone (Vivitrol)? no  Other current treatment for ethanol use disorder? no  Co-existing substance use? no    Review of PDMP: yes     Social History     Substance and Sexual Activity   Alcohol Use Yes    Comment: Every other day     Social History     Substance and Sexual Activity   Drug Use Not Currently     Social History     Tobacco Use   Smoking Status Never   Smokeless Tobacco Never       Review of Systems   Constitutional:  Negative for chills, diaphoresis and fever.   HENT:  Negative for congestion and rhinorrhea.    Eyes:  Negative for visual disturbance.   Respiratory:  Negative for cough and shortness of breath.    Cardiovascular:  Positive for leg swelling (worsening BLE edema since last d/c from hospital 1 month ago). Negative for chest pain.   Gastrointestinal:  Negative for abdominal pain, blood in stool, diarrhea, nausea and vomiting.   Genitourinary:  Negative for difficulty urinating and frequency.   Musculoskeletal:  Positive for gait problem. Negative for arthralgias and myalgias.   Skin:  Positive for color change (large bruise to L lumbar back for several days).   Neurological:  Positive for tremors and weakness (reports ongoing BLE weakness with legs feeling \"wobbly\"). Negative for seizures, numbness and headaches.   Psychiatric/Behavioral:  Positive for dysphoric mood (reports depression). Negative for hallucinations and suicidal ideas. The patient is nervous/anxious.        Historical Information   Past Medical History:   Diagnosis Date    Acute respiratory failure with hypoxia (HCC) 03/20/2024    Alcohol abuse     Depression     Dysphagia     Fracture of one rib, left side, initial encounter for closed fracture     GERD (gastroesophageal reflux disease)     Hypertension 03/22/2024    Hypokalemia     Hyponatremia 03/20/2024    Lactic acid acidosis 03/20/2024    Liver failure (HCC)     Muscle wasting and atrophy, not elsewhere classified, multiple sites     Muscle weakness     SERGEI " (obstructive sleep apnea)     Other disorders of bilirubin metabolism     Pleural effusion     Sacral fracture (HCC)     SIRS (systemic inflammatory response syndrome) (HCC) 03/20/2024    Splenic rupture      Past Surgical History:   Procedure Laterality Date    BRAIN SURGERY      IR CHEST TUBE PLACEMENT  3/20/2024     No family history on file.  Social History   Marital Status: Legally    Patient Pre-hospital Living Situation: stable, lives with a roommate  Patient Pre-hospital Level of Mobility: independent  Patient Pre-hospital Diet Restrictions: no hard foods due to lack of teeth    Allergies   Allergen Reactions    Benazepril Cough     cough  cough         Prior to Admission medications    Medication Sig Start Date End Date Taking? Authorizing Provider   carvedilol (COREG) 6.25 mg tablet Take 6.25 mg by mouth 2 (two) times a day with meals    Historical Provider, MD   FLUoxetine (PROzac) 10 mg capsule Take 20 mg by mouth daily    Historical Provider, MD   folic acid (FOLVITE) 1 mg tablet Take 1 mg by mouth daily    Historical Provider, MD   furosemide (LASIX) 40 mg tablet Take 1 tablet (40 mg total) by mouth daily 3/29/24   Lilly Rausch MD   lactulose 20 g/30 mL Take 30 g by mouth daily    Historical Provider, MD   lamoTRIgine (LaMICtal) 25 mg tablet Take 25 mg by mouth daily    Historical Provider, MD   Magnesium Oxide 400 MG CAPS Take 400 mg by mouth in the morning    Historical Provider, MD   omeprazole (PriLOSEC) 40 MG capsule Take 40 mg by mouth daily    Historical Provider, MD   rifaximin (XIFAXAN) 550 mg tablet Take 550 mg by mouth in the morning    Historical Provider, MD   spironolactone (ALDACTONE) 100 mg tablet Take 1 tablet (100 mg total) by mouth daily 3/29/24   Lilly Rausch MD       Current Facility-Administered Medications   Medication Dose Route Frequency    acetaminophen (TYLENOL) tablet 650 mg  650 mg Oral Q6H PRN    aluminum-magnesium hydroxide-simethicone (MAALOX) oral  suspension 30 mL  30 mL Oral Q6H PRN    carvedilol (COREG) tablet 6.25 mg  6.25 mg Oral BID With Meals    FLUoxetine (PROzac) capsule 20 mg  20 mg Oral Daily    folic acid (FOLVITE) tablet 1 mg  1 mg Oral Daily    furosemide (LASIX) tablet 40 mg  40 mg Oral Daily    lactulose (CHRONULAC) oral solution 30 g  30 g Oral Daily    lamoTRIgine (LaMICtal) tablet 25 mg  25 mg Oral Daily    magnesium sulfate 4 g/100 mL IVPB (premix) 4 g  4 g Intravenous Once    melatonin tablet 6 mg  6 mg Oral HS PRN    multivitamin-minerals (CENTRUM) tablet 1 tablet  1 tablet Oral Daily    ondansetron (ZOFRAN-ODT) dispersible tablet 4 mg  4 mg Oral Q6H PRN    pantoprazole (PROTONIX) EC tablet 40 mg  40 mg Oral Early Morning    rifaximin (XIFAXAN) tablet 550 mg  550 mg Oral Daily    sodium chloride 0.9 % infusion  75 mL/hr Intravenous Continuous    spironolactone (ALDACTONE) tablet 100 mg  100 mg Oral Daily    thiamine (VITAMIN B1) 500 mg in sodium chloride 0.9 % 50 mL IVPB  500 mg Intravenous Q8H ADRIAN       Continuous Infusions:sodium chloride, 75 mL/hr, Last Rate: 75 mL/hr (04/26/24 2251)             Objective       Intake/Output Summary (Last 24 hours) at 4/27/2024 0234  Last data filed at 4/27/2024 0100  Gross per 24 hour   Intake 730 ml   Output 100 ml   Net 630 ml       Invasive Devices:        Vitals   Vitals:    04/26/24 2040 04/26/24 2217 04/26/24 2344 04/27/24 0142   BP: (!) 150/106 130/75 139/77 122/82   TempSrc: Temporal Temporal Temporal Temporal   Pulse: 88 73 80 80   Resp: 16 16 16 16   Patient Position - Orthostatic VS: Sitting Lying Lying Lying   Temp: 97.7 °F (36.5 °C) 98.5 °F (36.9 °C) 98 °F (36.7 °C) 98.6 °F (37 °C)       Physical Exam  Vitals and nursing note reviewed.   Constitutional:       General: He is not in acute distress.     Appearance: He is obese. He is not diaphoretic.   HENT:      Head: Normocephalic and atraumatic.      Right Ear: External ear normal.      Left Ear: External ear normal.      Nose: Nose  normal.      Mouth/Throat:      Mouth: Mucous membranes are moist.      Comments: Mild tongue fasciculations and poor dentition present.  Eyes:      General: Scleral icterus present.      Extraocular Movements: Extraocular movements intact.      Right eye: No nystagmus.      Left eye: No nystagmus.      Conjunctiva/sclera: Conjunctivae normal.      Pupils: Pupils are equal, round, and reactive to light.   Cardiovascular:      Rate and Rhythm: Normal rate and regular rhythm.      Pulses:           Dorsalis pedis pulses are 2+ on the right side and 2+ on the left side.        Posterior tibial pulses are 2+ on the right side and 2+ on the left side.      Heart sounds: Normal heart sounds. No murmur heard.     No friction rub. No gallop.   Pulmonary:      Effort: Pulmonary effort is normal. No respiratory distress.      Breath sounds: Normal breath sounds. No wheezing, rhonchi or rales.      Comments: Mildly diminished breath sounds bibasilar  Abdominal:      General: Bowel sounds are normal. There is no distension.      Palpations: Abdomen is soft.      Tenderness: There is no abdominal tenderness. There is no guarding or rebound.   Musculoskeletal:         General: No swelling.      Cervical back: Neck supple.      Right lower le+ Pitting Edema present.      Left lower le+ Pitting Edema present.   Skin:     General: Skin is warm and dry.      Coloration: Skin is jaundiced.      Findings: Bruising (large bruise to L lumbar back, NTTP) present. No rash.   Neurological:      General: No focal deficit present.      Mental Status: He is alert and oriented to person, place, and time.      GCS: GCS eye subscore is 4. GCS verbal subscore is 5. GCS motor subscore is 6.      Cranial Nerves: No dysarthria or facial asymmetry.      Motor: Weakness (4/5  strength b/l, no other weakness noted) and tremor (BUE intention tremor) present.      Coordination: Finger-Nose-Finger Test abnormal (mild ataxia).   Psychiatric:          Attention and Perception: Attention normal. He does not perceive auditory or visual hallucinations.         Mood and Affect: Mood is anxious.         Speech: Speech normal.         Behavior: Behavior is cooperative.         Thought Content: Thought content does not include homicidal or suicidal ideation. Thought content does not include homicidal or suicidal plan.             Data:    EKG, Pathology, and Other Studies: I have personally reviewed pertinent reports.    EKG: NSR, nonspecific ST abnormality, no evidence of acute ischemia, prolonged QTc 491 ms.    Lab Results:  CBC ETOH     Lab Results   Component Value Date    WBC 6.01 04/26/2024    RBC 3.32 (L) 04/26/2024    HGB 10.9 (L) 04/26/2024    HGB 7.9 (L) 07/04/2023    HCT 32.2 (L) 04/26/2024    HCT 21.7 (L) 07/04/2023    MCV 97 04/26/2024    MCH 32.8 04/26/2024    MCHC 33.9 04/26/2024    RDW 13.9 04/26/2024    PLT 94 (L) 04/26/2024    MPV 9.4 04/26/2024      Lab Results   Component Value Date    LACTICACID 2.6 (HH) 04/26/2024      CMP UA         Component Value Date/Time    K 3.5 04/26/2024 2105    K 4.3 11/29/2023 0525     04/26/2024 2105     11/29/2023 0525    CO2 23 04/26/2024 2105    CO2 24 11/29/2023 0525    BUN 5 04/26/2024 2105    BUN 13 11/29/2023 0525    CREATININE 0.71 04/26/2024 2105    CREATININE 0.76 11/29/2023 0525         Component Value Date/Time    CALCIUM 7.4 (L) 04/26/2024 2105    CALCIUM 8.6 11/29/2023 0525    ALKPHOS 81 04/26/2024 2105    ALKPHOS 80 11/10/2023 0629    AST 38 04/26/2024 2105    AST 24 11/10/2023 0629    ALT 14 04/26/2024 2105    ALT 13 11/10/2023 0629      Lab Results   Component Value Date    CLARITYU Clear 04/26/2024    COLORU [place] 04/26/2024    SPECGRAV <=1.005 04/26/2024    PHUR 6.5 04/26/2024    GLUCOSEU Negative 04/26/2024    KETONESU Negative 04/26/2024    BLOODU Moderate (A) 04/26/2024    PROTEIN UA Negative 04/26/2024    NITRITE Negative 04/26/2024    BILIRUBINUR Small (A) 04/26/2024     "UROBILINOGEN 4.0 (A) 04/26/2024    LEUKOCYTESUR Negative 04/26/2024    WBCUA None Seen 04/26/2024    RBCUA 1-2 04/26/2024    HYALINE 0-1 (A) 03/20/2024    BACTERIA None Seen 04/26/2024    EPIS None Seen 04/26/2024        Liver Function Test: ASA     Lab Results   Component Value Date    TBILI 7.01 (H) 04/26/2024    TBILI 5.0 (H) 11/10/2023    BILIDIR 2.52 (H) 04/26/2024    BILIDIR 3.5 (H) 08/16/2023    ALKPHOS 81 04/26/2024    ALKPHOS 80 11/10/2023    AST 38 04/26/2024    AST 24 11/10/2023    ALT 14 04/26/2024    ALT 13 11/10/2023    TP 5.4 (L) 04/26/2024    TP 5.7 (L) 11/10/2023    ALB 2.2 (L) 04/26/2024    ALB 2.4 (L) 11/10/2023    ALB 3.6 (L) 03/05/2021      No results found for: \"SALICYLATE\"   Troponin APAP     Lab Results   Component Value Date    TROPONINI <0.02 02/21/2021      No results found for: \"ACTMNPHEN\"   VBG HCG     No results found for: \"PHVEN\", \"FFF7OSY\", \"PO2VEN\", \"YHO2IRD\", \"BEVEN\", \"G1SQRSHXZ\", \"V5HZIWJ\"   No results found for: \"HCGQUANT\"   ABG Urine Drug Screen     No results found for: \"PHART\", \"AKQ0OUJ\", \"PO2ART\", \"GVY7RPY\", \"BEART\", \"R8XABUARS\", \"O2HGB\", \"SOURC\", \"LINWOOD\", \"VTAC\", \"ACRATE\", \"INSPIREDAIR\", \"PEEP\"   Lab Results   Component Value Date    AMPMETHUR Negative 04/26/2024    BARBTUR Negative 04/26/2024    BDZUR Negative 04/26/2024    COCAINEUR Negative 04/26/2024    METHADONEUR Negative 04/26/2024    OPIATEUR Negative 04/26/2024    PCPUR Negative 04/26/2024    THCUR Negative 04/26/2024    OXYCODONEUR Negative 04/26/2024      Lactate INR     Lab Results   Component Value Date    LACTICACID 2.6 (HH) 04/26/2024      Lab Results   Component Value Date    INR 2.15 (H) 04/26/2024    INR 2.5 08/14/2023      PTT Protime     Lab Results   Component Value Date/Time    PTT 43 (H) 04/26/2024 01:01 PM        Lab Results   Component Value Date/Time    PROTIME 24.4 (H) 04/26/2024 01:01 PM              Imaging Studies: I have personally reviewed pertinent reports.          Minutes of critical care " time 60  -Critical care time was exclusive of separately billable procedures and teaching time.   -Critical care was necessary to treat or prevent imminent or life-threatening deterioration of the following condition: CNS failure/compromise, toxidrome (ethanol withdrawal),  toxidrome, withdrawal, CNS failure/compromise, hepatic failure, dehydration, and circulatory failure  -Critical care time was spent personally by me on the following activities as well as the above as per the course and rest of chart: obtaining history from patient/surrogate, development of a treatment plan, discussions with referring provider(s), evaluation of patient's response to the treatment, examination of the patient, performing treatments and interventions, re-evaluation of the patient's condition, review of old charts, ordering/interpreting laboratory studies, ordering/interpreting of radiographic studies.      ** Please Note: This note has been constructed using a voice recognition system. **

## 2024-04-27 NOTE — ASSESSMENT & PLAN NOTE
"Pt with a h/o alcoholic cirrhosis  CT chest/abd/pelvis 4/26 revealed, \"Hepatic cirrhosis and evidence of portal hypertension. 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

## 2024-04-27 NOTE — ASSESSMENT & PLAN NOTE
Elevated lactic acid 2.2 -> 2.3 in the ED  Repeat lactic acid on admission was 2.1  Possibly 2/2 alcohol metabolism and hepatic impairment d/t cirrhosis   IVF hydration   Will continue to trend lactic acid as needed  Apppreciate medicine/GI recommendations

## 2024-04-27 NOTE — PROGRESS NOTES
-PROGRESS NOTE  DEPARTMENT OF MEDICAL TOXICOLOGY  LEVEL 4 MEDICAL DETOX UNIT  Curt Dominguez 53 y.o. male MRN: [id number]  Unit/Bed#:  DETOX 504-01 Encounter: [id number]      Reason for Admission/Principal Problem: ETOH wd  Rounding Provider: Brunilda Felix MD  Attending Provider: Agustín Roper DO   4/26/2024  8:24 PM           Prolonged Q-T interval on ECG  Assessment & Plan  EKG in the ED revealed QTc 491 ms --> 515  Repeat EKG in the AM  Avoid QT prolonging agents  Telemetry monitoring during acute withdrawal     Normocytic anemia, not due to blood loss  Assessment & Plan  Lab Results   Component Value Date    WBC 5.47 04/27/2024    HGB 9.6 (L) 04/27/2024    HCT 27.7 (L) 04/27/2024    MCV 98 04/27/2024    PLT 77 (L) 04/27/2024      Possibly 2/2 iron deficiency/malnutrition v. Anemia of chronic disease given hx of cirrhosis  Iron panel pending - will initiate supplementation if indicated  Daily multivitamin supplementation  Continue monitoring CBC and transfuse if indicated     Hypomagnesemia  Assessment & Plan  Mag 1.5 on admission  Repleting with IV magnesium sulfate 4 g  Continue monitoring and replete electrolytes as indicated     Alcohol use disorder, severe, dependence (HCC)  Assessment & Plan  Pt with a h/o chronic heavy alcohol use with intermittent period of sobriety over the years  Currently drinks 3 large cans (24 oz) of beer daily  Denies H/o withdrawal seizures  Interested in Vivitrol injections at this time as long as liver function is acceptable  Withdrawal management as above  Initiate IVFs, high dose IV thiamine, folic acid, and MVI  Consult case management/CRS for assistance with aftercare resources - pt interested in IP rehabilitation at this time     GERD (gastroesophageal reflux disease)  Assessment & Plan  Continue daily PPI     Hypertension  Assessment & Plan  Patient with a history of HTN  Home medication regimen: Coreg BID, spironolactone and Lasix daily  BP hypertensive at 150/106 upon  arrival to the unit in the setting of acute alcohol withdrawal and chronic HTN  Most Recent Blood Pressure: 122/82    Will continue home medications given multiple medical comorbidities  Continue monitoring BP     Hypokalemia  Assessment & Plan  Mild, with K 3.4 on initial labs --> 3.5  Repleted with KCl 40 mEq PO on admission  Continue monitoring and replete electrolytes as indicated     Lactic acidosis  Assessment & Plan  Elevated lactic acid 2.2 -> 2.3 in the ED  Repeat lactic acid on admission was 2.1  Possibly 2/2 alcohol metabolism and hepatic impairment d/t cirrhosis   IVF hydration   Will continue to trend lactic acid as needed  Apppreciate medicine/GI recommendations    Serum total bilirubin elevated  Assessment & Plan  Total bilirubin 7.73 -> 7.01 on admission   Direct bilirubin 2.52  In light of alcoholic cirrhosis   Will continue to monitor at this time  Patient jaundiced  Appreciate medicine/GI recommendations    Elevated INR  Assessment & Plan  INR 2.15 on admission --> 2.31  In light of alcoholic cirrhosis   Continue to monitor  Appreciate medicine/GI recommendations    SERGEI (obstructive sleep apnea)  Assessment & Plan  Patient is not currently using CPAP at home  Will monitor on continuous pulse oximetry at this time  Recommend f/u for sleep study outpatient     Bipolar depression (HCC)  Assessment & Plan  Patient with a h/o bipolar disorder  Home medications include Prozac and Lamictal daily  Denies SI/HI/thoughts of self harm  Continue home medications  Recommend OP f/u with PCP/OP Psychiatry     Ambulatory dysfunction  Assessment & Plan  Pt with a h/o ambulatory dysfunction and frequent falls at home  Currently requiring assistance x1 with ambulation to the restroom, mild finger-to-nose ataxia present on exam  Initiate high dose thiamine Q8H x9 doses  PT eval and treat   Fall precautions in place    Thrombocytopenia (HCC)  Assessment & Plan  PTL 94 on admission --> 77  In the setting of chronic  "alcohol use  Daily thiamine/folic acid supplementation  Will hold Lovenox given PTL <100; SCDs currently in place  Continue monitoring CBC  Encourage alcohol cessation     Alcoholic cirrhosis (HCC)  Assessment & Plan  Pt with a h/o alcoholic cirrhosis  CT chest/abd/pelvis 4/26 revealed, \"Hepatic cirrhosis and evidence of portal hypertension. 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; however, pt poor candidate for TIPS procedure due to h/o hepatic encephalopathy  Will continue their recommendations including home medications of spironolactone, Lasix, lactulose, and Rifaximin, as well as low-salt diet  Calculated MELD-Na score 23 on this admission  Estimated 90-day mortality 14-15%  Continue to monitor CMP and PT/INR in AM  SLIM consulted at this time due to pt's medical complexity, will appreciate recommendations  May also consider repeat GI consult   Encourage alcohol cessation   Recommend outpatient follow-up GI for ongoing monitoring/maintenance     Pleural effusion, right  Assessment & Plan  Pt with a h/o recent R pleural effusion with hospitalization 3/20-3/29/24 requiring chest tube from 3/20-3/24 with additional 2 L pleural fluid drained by CC team  R pleural effusion was re-demonstrated on CT C/A/P 4/26 but noted to be smaller in size compared to previous CT  Currently denies SOB, HODGSON, or CP  VSS, SpO2 97%  on RA  Continue to monitor VS, respiratory status    * Alcohol withdrawal " syndrome with complication (HCC)  Assessment & Plan  Patient with a history of chronic daily alcohol use  Last drink 4/26 around 1100  Serum alcohol 362 in the ED (4/26, 1301)  Initiate SEWS protocol for medical management of alcohol withdrawal  Current alcohol withdrawal signs/symptoms include decreased appetite  SEWS score 8 upon admission  Administer 260 mg of phenobarbital as initial dose  Continue monitoring under protocol and administer phenobarbital as indicated  Continuous pulse ox and telemetry monitoring             VTE Pharmacologic Prophylaxis:   Pharmacologic: Pharmacologic VTE Prophylaxis contraindicated due to PLT 77  Mechanical VTE Prophylaxis in Place: yes    Code Status: Level 1 - Full Code    Patient Centered Rounds: I have performed bedside rounds with nursing staff today.    Discussions with Specialists or Other Care Team Provider: no, pending general medicine     Education and Discussions with Family / Patient: recommend cessation of recreational substances    Time Spent for Care: 15 minutes.  More than 50% of total time spent on counseling and coordination of care as described above.    Current Length of Stay: 1 day(s)    Current Patient Status: Inpatient     Certification Statement: The patient will continue to require additional inpatient hospital stay due to ETOH wd, hepatic dysfunction  Discharge Plan: potentially inpatient rehab following medical stabilization, anticipated in 72+ hours        Subjective:   This morning, the patient endorses slightly decreased appetite, but overall feels improved than when he was first admitted.  Denies chest pain, shortness of breath, nausea, vomiting, tremor, urinary symptoms, changes in stool, leg pain or leg swelling, bleeding or bruising.    Objective:     Clinical Opiate Withdrawal Scale  Pulse: 82    SEWS Total Score: 8 (4/27/2024 11:30 AM)        Last 24 Hours Medication List:   Current Facility-Administered Medications   Medication Dose Route  Frequency Provider Last Rate    acetaminophen  650 mg Oral Q6H PRN Bela Shields PA-C      aluminum-magnesium hydroxide-simethicone  30 mL Oral Q6H PRN Bela Shields PA-C      carvedilol  6.25 mg Oral BID With Meals Bela Shields PA-C      folic acid  1 mg Oral Daily Bela Shields, CHIARA      furosemide  40 mg Oral Daily Bela Shields PA-C      lactulose  30 g Oral Daily Bela Shields PA-C      lamoTRIgine  25 mg Oral Daily Bela Shields PA-C      magnesium sulfate  2 g Intravenous Once Brunilda Felix MD      melatonin  6 mg Oral HS PRN Bela Shields PA-C      multivitamin-minerals  1 tablet Oral Daily Bela Shields PA-C      ondansetron  4 mg Oral Q6H PRN Bela Shields PA-C      pantoprazole  40 mg Oral Early Morning Bela Shields PA-C      rifaximin  550 mg Oral Daily Bela Shields PA-C      sodium chloride  75 mL/hr Intravenous Continuous Bela Shields PA-C 75 mL/hr (24 2251)    spironolactone  100 mg Oral Daily Blea Shields PA-C      thiamine  500 mg Intravenous Q8H ADRIAN Bela Shields PA-C 500 mg (24 0500)         Vitals:   Temp (24hrs), Av.7 °F (36.5 °C), Min:96.4 °F (35.8 °C), Max:98.6 °F (37 °C)    Temp:  [96.4 °F (35.8 °C)-98.6 °F (37 °C)] 97.6 °F (36.4 °C)  HR:  [73-91] 82  Resp:  [14-18] 18  BP: (110-150)/() 110/67  SpO2:  [92 %-98 %] 95 %  Body mass index is 30.3 kg/m².     Input and Output Summary (last 24 hours):  Intake/Output Summary (Last 24 hours) at 2024 1157  Last data filed at 2024 0100  Gross per 24 hour   Intake 730 ml   Output 100 ml   Net 630 ml       Physical Exam:   Physical Exam  Vitals and nursing note reviewed.   Constitutional:       General: He is not in acute distress.     Appearance: He is well-developed. He is ill-appearing (chronic). He is not toxic-appearing or diaphoretic.   HENT:      Head: Normocephalic  and atraumatic.      Right Ear: External ear normal.      Left Ear: External ear normal.      Nose: Nose normal.      Mouth/Throat:      Mouth: Mucous membranes are dry.   Eyes:      General:         Right eye: No discharge.         Left eye: No discharge.      Extraocular Movements: Extraocular movements intact.      Conjunctiva/sclera: Conjunctivae normal.      Pupils: Pupils are equal, round, and reactive to light.   Cardiovascular:      Rate and Rhythm: Normal rate and regular rhythm.      Heart sounds: Normal heart sounds.      No friction rub. No gallop.   Pulmonary:      Effort: Pulmonary effort is normal. No respiratory distress.      Breath sounds: Normal breath sounds. No stridor. No wheezing or rales.   Chest:      Chest wall: No tenderness.   Abdominal:      General: Bowel sounds are normal.      Palpations: Abdomen is soft.      Tenderness: There is abdominal tenderness (generalized -- mild). There is guarding.   Musculoskeletal:         General: No tenderness or deformity. Normal range of motion.      Cervical back: Normal range of motion and neck supple.      Right lower leg: No edema.      Left lower leg: No edema.   Skin:     General: Skin is warm and dry.      Capillary Refill: Capillary refill takes less than 2 seconds.      Coloration: Skin is jaundiced.   Neurological:      Mental Status: He is alert and oriented to person, place, and time.      Motor: No weakness or tremor.   Psychiatric:         Attention and Perception: Attention normal.         Mood and Affect: Mood normal.         Additional Data:     Labs:   Results from last 7 days   Lab Units 04/27/24  0450 04/26/24  1301   WBC Thousand/uL 5.47 6.01   HEMOGLOBIN g/dL 9.6* 10.9*   HEMATOCRIT % 27.7* 32.2*   PLATELETS Thousands/uL 77* 94*   SEGS PCT %  --  42*   LYMPHO PCT %  --  34   MONO PCT %  --  13*   EOS PCT %  --  10*      Results from last 7 days   Lab Units 04/27/24  0450   SODIUM mmol/L 140   POTASSIUM mmol/L 3.5   CHLORIDE  mmol/L 106   CO2 mmol/L 25   BUN mg/dL 5   CREATININE mg/dL 0.69   ANION GAP mmol/L 9   CALCIUM mg/dL 7.3*   ALBUMIN g/dL 2.0*   TOTAL BILIRUBIN mg/dL 7.13*   ALK PHOS U/L 75   ALT U/L 14   AST U/L 40*   GLUCOSE RANDOM mg/dL 86      Results from last 7 days   Lab Units 04/27/24  0523   INR  2.31*                Results from last 7 days   Lab Units 04/27/24  0750 04/27/24  0450 04/26/24  2355 04/26/24  2105   LACTIC ACID mmol/L 2.2* 2.4* 2.6* 2.1*          * I Have Reviewed All Lab Data Listed Above.  * Additional Pertinent Lab Tests Reviewed: All Labs For Current Hospital Admission Reviewed      Imaging Studies: I have personally reviewed pertinent reports.        Recent Cultures (last 7 days):          Today, Patient Was Seen By: Brunilda Felix MD    ** Please Note: Dictation voice to text software may have been used in the creation of this document. **

## 2024-04-27 NOTE — ASSESSMENT & PLAN NOTE
Patient is not currently using CPAP at home  Will monitor on continuous pulse oximetry at this time  Recommend f/u for sleep study outpatient

## 2024-04-27 NOTE — ASSESSMENT & PLAN NOTE
Patient with a h/o bipolar disorder  Home medications include Prozac and Lamictal daily  Denies SI/HI/thoughts of self harm  Continue home medications  Recommend OP f/u with PCP/OP Psychiatry

## 2024-04-27 NOTE — ASSESSMENT & PLAN NOTE
Total bilirubin 7.73 -> 7.01 on admission   Direct bilirubin 2.52  In light of alcoholic cirrhosis   Will continue to monitor at this time  Patient jaundiced  Appreciate medicine/GI recommendations

## 2024-04-27 NOTE — ASSESSMENT & PLAN NOTE
PTL 94 on admission --> 77  In the setting of chronic alcohol use  Daily thiamine/folic acid supplementation  Will hold Lovenox given PTL <100; SCDs currently in place  Continue monitoring CBC  Encourage alcohol cessation

## 2024-04-27 NOTE — ASSESSMENT & PLAN NOTE
Pt with a h/o recent R pleural effusion with hospitalization 3/20-3/29/24 requiring chest tube from 3/20-3/24 with additional 2 L pleural fluid drained by CC team  R pleural effusion was re-demonstrated on CT C/A/P 4/26 but noted to be smaller in size compared to previous CT  Currently denies SOB, HODGSON, or CP  VSS, SpO2 97%  on RA  Continue to monitor VS, respiratory status

## 2024-04-27 NOTE — ASSESSMENT & PLAN NOTE
PTL 94 on admission  In the setting of chronic alcohol use  Daily thiamine/folic acid supplementation  Will hold Lovenox given PTL <100; SCDs currently in place  Continue monitoring CBC  Encourage alcohol cessation

## 2024-04-27 NOTE — ASSESSMENT & PLAN NOTE
EKG in the ED revealed QTc 491 ms --> 515  Repeat EKG in the AM  Avoid QT prolonging agents  Telemetry monitoring during acute withdrawal

## 2024-04-27 NOTE — ASSESSMENT & PLAN NOTE
Mag 1.5 on admission  Repleting with IV magnesium sulfate 4 g  Continue monitoring and replete electrolytes as indicated

## 2024-04-27 NOTE — PROCEDURES
EKG 4/24/24, 0433: normal sinus rhythm, ventricular rate 87 bpm, no acute ST segment/T wave changes, normal axis and OK/QRS intervals, prolonged QTc interval at 515 ms. Compared to previous tracing, nonspecific ST abnormality is no longer present and QTc prolongation has worsened.    Will discontinue pt's home Prozac for this AM and continue to monitor serial EKGs for improvement in QTc. Will also await AM lab results and provide electrolyte supplementation as indicated. Telemetry in place and to continue at this time.

## 2024-04-27 NOTE — ASSESSMENT & PLAN NOTE
Total bilirubin 7.73 -> 7.01 on admission   Direct bilirubin 2.52  In light of alcoholic cirrhosis   Will continue to monitor at this time

## 2024-04-27 NOTE — ASSESSMENT & PLAN NOTE
Pt with a h/o ambulatory dysfunction and frequent falls at home  Currently requiring assistance x1 with ambulation to the restroom, mild finger-to-nose ataxia present on exam  Initiate high dose thiamine Q8H x9 doses  PT eval and treat   Fall precautions in place

## 2024-04-27 NOTE — ASSESSMENT & PLAN NOTE
Patient with a history of HTN  Home medication regimen: Coreg BID, spironolactone and Lasix daily  BP hypertensive at 150/106 upon arrival to the unit in the setting of acute alcohol withdrawal and chronic HTN  Most Recent Blood Pressure: 122/82    Will continue home medications given multiple medical comorbidities  Continue monitoring BP

## 2024-04-27 NOTE — ASSESSMENT & PLAN NOTE
Mild, with K 3.4 on initial labs  Repleted with KCl 40 mEq PO on admission  Continue monitoring and replete electrolytes as indicated

## 2024-04-27 NOTE — ASSESSMENT & PLAN NOTE
Lab Results   Component Value Date    WBC 5.47 04/27/2024    HGB 9.6 (L) 04/27/2024    HCT 27.7 (L) 04/27/2024    MCV 98 04/27/2024    PLT 77 (L) 04/27/2024      Possibly 2/2 iron deficiency/malnutrition v. Anemia of chronic disease given hx of cirrhosis  Iron panel pending - will initiate supplementation if indicated  Daily multivitamin supplementation  Continue monitoring CBC and transfuse if indicated

## 2024-04-27 NOTE — PLAN OF CARE
Problem: PAIN - ADULT  Goal: Verbalizes/displays adequate comfort level or baseline comfort level  Description: Interventions:  - Encourage patient to monitor pain and request assistance  - Assess pain using appropriate pain scale  - Administer analgesics based on type and severity of pain and evaluate response  - Implement non-pharmacological measures as appropriate and evaluate response  - Consider cultural and social influences on pain and pain management  - Notify physician/advanced practitioner if interventions unsuccessful or patient reports new pain  Outcome: Not Progressing     Problem: INFECTION - ADULT  Goal: Absence or prevention of progression during hospitalization  Description: INTERVENTIONS:  - Assess and monitor for signs and symptoms of infection  - Monitor lab/diagnostic results  - Monitor all insertion sites, i.e. indwelling lines, tubes, and drains  - Monitor endotracheal if appropriate and nasal secretions for changes in amount and color  - [place] appropriate cooling/warming therapies per order  - Administer medications as ordered  - Instruct and encourage patient and family to use good hand hygiene technique  - Identify and instruct in appropriate isolation precautions for identified infection/condition  Outcome: Not Progressing  Goal: Absence of fever/infection during neutropenic period  Description: INTERVENTIONS:  - Monitor WBC    Outcome: Not Progressing     Problem: SAFETY ADULT  Goal: Patient will remain free of falls  Description: INTERVENTIONS:  - Educate patient/family on patient safety including physical limitations  - Instruct patient to call for assistance with activity   - Consult OT/PT to assist with strengthening/mobility   - Keep Call bell within reach  - Keep bed low and locked with side rails adjusted as appropriate  - Keep care items and personal belongings within reach  - Initiate and maintain comfort rounds  - Make Fall Risk Sign visible to staff  - Offer Toileting  every 2 Hours, in advance of need  - Obtain necessary fall risk management equipment: yellow bracelet  - Apply yellow socks and bracelet for high fall risk patients  - Consider moving patient to room near nurses station  Outcome: Not Progressing  Goal: Maintain or return to baseline ADL function  Description: INTERVENTIONS:  -  Assess patient's ability to carry out ADLs; assess patient's baseline for ADL function and identify physical deficits which impact ability to perform ADLs (bathing, care of mouth/teeth, toileting, grooming, dressing, etc.)  - Assess/evaluate cause of self-care deficits   - Assess range of motion  - Assess patient's mobility; develop plan if impaired  - Assess patient's need for assistive devices and provide as appropriate  - Encourage maximum independence but intervene and supervise when necessary  - Involve family in performance of ADLs  - Assess for home care needs following discharge   - Consider OT consult to assist with ADL evaluation and planning for discharge  - Provide patient education as appropriate  Outcome: Not Progressing  Goal: Maintains/Returns to pre admission functional level  Description: INTERVENTIONS:  - Perform AM-PAC 6 Click Basic Mobility/ Daily Activity assessment daily.  - Set and communicate daily mobility goal to care team and patient/family/caregiver.   - Collaborate with rehabilitation services on mobility goals if consulted  - Dangle patient 3 times a day  - Out of bed to chair 1 times a day   - Out of bed for meals 1 times a day  - Out of bed for toileting  - Record patient progress and toleration of activity level   Outcome: Not Progressing     Problem: DISCHARGE PLANNING  Goal: Discharge to home or other facility with appropriate resources  Description: INTERVENTIONS:  - Identify barriers to discharge w/patient and caregiver  - Arrange for needed discharge resources and transportation as appropriate  - Identify discharge learning needs (meds, wound care,  etc.)  - Arrange for interpretive services to assist at discharge as needed  - Refer to Case Management Department for coordinating discharge planning if the patient needs post-hospital services based on physician/advanced practitioner order or complex needs related to functional status, cognitive ability, or social support system  Outcome: Not Progressing     Problem: Knowledge Deficit  Goal: Patient/family/caregiver demonstrates understanding of disease process, treatment plan, medications, and discharge instructions  Description: Complete learning assessment and assess knowledge base.  Interventions:  - Provide teaching at level of understanding  - Provide teaching via preferred learning methods  Outcome: Not Progressing

## 2024-04-27 NOTE — ASSESSMENT & PLAN NOTE
Mild, with K 3.4 on initial labs --> 3.5  Repleted with KCl 40 mEq PO on admission  Continue monitoring and replete electrolytes as indicated

## 2024-04-27 NOTE — ASSESSMENT & PLAN NOTE
Pt with a h/o chronic heavy alcohol use with intermittent period of sobriety over the years  Currently drinks 3 large cans (24 oz) of beer daily  Denies H/o withdrawal seizures  Interested in Vivitrol injections at this time as long as liver function is acceptable  Withdrawal management as above  Initiate IVFs, high dose IV thiamine, folic acid, and MVI  Consult case management/CRS for assistance with aftercare resources - pt interested in IP rehabilitation at this time

## 2024-04-27 NOTE — ASSESSMENT & PLAN NOTE
Patient with a history of chronic daily alcohol use  Last drink 4/26 around 1100  Serum alcohol 362 in the ED (4/26, 1301)  Initiate SEWS protocol for medical management of alcohol withdrawal  Current alcohol withdrawal signs/symptoms include decreased appetite  SEWS score 8 upon admission  Administer 260 mg of phenobarbital as initial dose  Continue monitoring under protocol and administer phenobarbital as indicated  Continuous pulse ox and telemetry monitoring

## 2024-04-27 NOTE — ASSESSMENT & PLAN NOTE
Elevated lactic acid 2.2 -> 2.3 in the ED  Repeat lactic acid on admission was 2.1  Possibly 2/2 alcohol metabolism and hepatic impairment d/t cirrhosis   IVF hydration   Will continue to trend lactic acid at this time

## 2024-04-27 NOTE — DISCHARGE INSTR - OTHER ORDERS
Cary Chino   Certified     [facility] [place] and [facility]  [phone]            Crisis Information  Emergency Crisis Services: [phone]  After hours (4:30 p.m.-8:30 a.m. and weekends/holidays):  [phone] or [phone]  Emergency crisis services are available 24 hours a day, 7 days a week, and 365 days a year. Walk-ins go to the rear of [address] [place] [address] (behind [address] on [facility] ).  Need Help Now?  If you or someone you care about is having a problem with drugs or alcohol, there are several ways to help:  The [facility] Drug & Alcohol Program can help walk you through your options for getting help. You can get further information by calling [phone], Monday through Friday, 8:30 AM to 4:30 PM.  If it is after hours or a holiday, you can contact [facility] Help at 7-167-0TA HELP (1-966.619.1006) for further assistance. If it is after hours and a medical emergency you should seek help from your local hospital emergency room.  If you have Health Insurance or Medical Assistance, you can get further information on accessing treatment by contacting the number on the back of your card listed under “Behavioral Health or Substance Abuse Services”.  For further information on available services, refer to [place] Department of Drug & Alcohol Programs website at http://www.ddap.[place].gov/gethelpnow  [facility] Drug & Alcohol Program Assessment Providers:  [place] [facility]  [phone] Fax  [address], [address]  [place] [address] [facility]  [phone] Fax  [address] [address]   [place]  [phone] Fax  [address]th [place] [address] [facility]  [phone] Fax  Marcial Dixon  [place] [address] [place] [address]   [facility]  [phone] Fax  [address] [place] [address] [facility]  [phone] Fax  [address] [place] [address]   [facility]  [phone] Fax  [address] [place] [address]       Medication-Assisted Treatment  Medication-assisted treatment (MAT) is the use of medications, in combination with counseling and behavioral therapy, which is effective in the treatment of opioid use disorders (OUD) and can help some people to sustain recovery       Local MAT providers:  [facility]  [facility]    What can you do?  Reach out, if you think someone you know has a problem. Talk to family members, friends, or a health care professional. The earlier treatment begins, the better outcomes are likely to be.  Understand that treatment is effective and substance use disorders can be effectively treated with behavioral therapies.    From the [facility] website www.[place].gov/guides/opioid-epidemic/#GetNaloxone    How do I get naloxone?  Family members and friends can access naloxone by:    Obtaining a prescription from their family doctor  Using the standing order issued by Acting Physician General Nell Skinner. A standing order is a prescription written for the general public, rather than specifically for an individual.  To use the standing order, print it and take it with you to the pharmacy or have the digital version on your phone. Download the standing order from the Department of Health (PDF).    If you are unable to print it or use the digital version, the standing order is kept on file at many pharmacies. If a pharmacy does not have it on file, they may have the ability to look it up.    Naloxone prescriptions can be filled at most  pharmacies. Although the medication might not be available for same-day pickup, it often can be ordered and available within a day or two.
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
## 2024-04-28 LAB
ALBUMIN SERPL BCP-MCNC: 1.8 G/DL (ref 3.5–5)
ALP SERPL-CCNC: 70 U/L (ref 34–104)
ALT SERPL W P-5'-P-CCNC: 10 U/L (ref 7–52)
ANION GAP SERPL CALCULATED.3IONS-SCNC: 4 MMOL/L (ref 4–13)
AST SERPL W P-5'-P-CCNC: 31 U/L (ref 13–39)
ATRIAL RATE: 59 BPM
ATRIAL RATE: 72 BPM
BILIRUB DIRECT SERPL-MCNC: 3.24 MG/DL (ref 0–0.2)
BILIRUB SERPL-MCNC: 8.21 MG/DL (ref 0.2–1)
BUN SERPL-MCNC: 7 MG/DL (ref 5–25)
CALCIUM ALBUM COR SERPL-MCNC: 9 MG/DL (ref 8.3–10.1)
CALCIUM SERPL-MCNC: 7.2 MG/DL (ref 8.4–10.2)
CHLORIDE SERPL-SCNC: 107 MMOL/L (ref 96–108)
CO2 SERPL-SCNC: 24 MMOL/L (ref 21–32)
CREAT SERPL-MCNC: 0.74 MG/DL (ref 0.6–1.3)
ERYTHROCYTE [DISTWIDTH] IN BLOOD BY AUTOMATED COUNT: 14.4 % (ref 11.6–15.1)
GFR SERPL CREATININE-BSD FRML MDRD: 106 ML/MIN/1.73SQ M
GLUCOSE SERPL-MCNC: 106 MG/DL (ref 65–140)
HCT VFR BLD AUTO: 28 % (ref 36.5–49.3)
HGB BLD-MCNC: 9.5 G/DL (ref 12–17)
LACTATE SERPL-SCNC: 0.8 MMOL/L (ref 0.5–2)
MAGNESIUM SERPL-MCNC: 1.8 MG/DL (ref 1.9–2.7)
MCH RBC QN AUTO: 33.5 PG (ref 26.8–34.3)
MCHC RBC AUTO-ENTMCNC: 33.9 G/DL (ref 31.4–37.4)
MCV RBC AUTO: 99 FL (ref 82–98)
P AXIS: 55 DEGREES
P AXIS: 66 DEGREES
PLATELET # BLD AUTO: 83 THOUSANDS/UL (ref 149–390)
PMV BLD AUTO: 9.7 FL (ref 8.9–12.7)
POTASSIUM SERPL-SCNC: 3.7 MMOL/L (ref 3.5–5.3)
PR INTERVAL: 108 MS
PR INTERVAL: 122 MS
PROT SERPL-MCNC: 4.7 G/DL (ref 6.4–8.4)
QRS AXIS: 51 DEGREES
QRS AXIS: 67 DEGREES
QRSD INTERVAL: 92 MS
QRSD INTERVAL: 94 MS
QT INTERVAL: 464 MS
QT INTERVAL: 512 MS
QTC INTERVAL: 506 MS
QTC INTERVAL: 508 MS
RBC # BLD AUTO: 2.84 MILLION/UL (ref 3.88–5.62)
SODIUM SERPL-SCNC: 135 MMOL/L (ref 135–147)
T WAVE AXIS: 21 DEGREES
T WAVE AXIS: 24 DEGREES
VENTRICULAR RATE: 59 BPM
VENTRICULAR RATE: 72 BPM
WBC # BLD AUTO: 5.13 THOUSAND/UL (ref 4.31–10.16)

## 2024-04-28 PROCEDURE — 93005 ELECTROCARDIOGRAM TRACING: CPT

## 2024-04-28 PROCEDURE — 99233 SBSQ HOSP IP/OBS HIGH 50: CPT

## 2024-04-28 PROCEDURE — 93010 ELECTROCARDIOGRAM REPORT: CPT | Performed by: INTERNAL MEDICINE

## 2024-04-28 PROCEDURE — 82248 BILIRUBIN DIRECT: CPT

## 2024-04-28 PROCEDURE — 83605 ASSAY OF LACTIC ACID: CPT

## 2024-04-28 PROCEDURE — 97163 PT EVAL HIGH COMPLEX 45 MIN: CPT

## 2024-04-28 PROCEDURE — 80053 COMPREHEN METABOLIC PANEL: CPT

## 2024-04-28 PROCEDURE — NC001 PR NO CHARGE

## 2024-04-28 PROCEDURE — 83735 ASSAY OF MAGNESIUM: CPT

## 2024-04-28 PROCEDURE — 85027 COMPLETE CBC AUTOMATED: CPT

## 2024-04-28 RX ORDER — GINSENG 100 MG
1 CAPSULE ORAL ONCE
Status: COMPLETED | OUTPATIENT
Start: 2024-04-28 | End: 2024-04-28

## 2024-04-28 RX ORDER — MAGNESIUM SULFATE HEPTAHYDRATE 40 MG/ML
4 INJECTION, SOLUTION INTRAVENOUS ONCE
Status: COMPLETED | OUTPATIENT
Start: 2024-04-28 | End: 2024-04-28

## 2024-04-28 RX ADMIN — BACITRACIN 1 LARGE APPLICATION: 500 OINTMENT TOPICAL at 11:03

## 2024-04-28 RX ADMIN — LAMOTRIGINE 25 MG: 25 TABLET ORAL at 08:46

## 2024-04-28 RX ADMIN — MELATONIN TAB 3 MG 6 MG: 3 TAB at 22:25

## 2024-04-28 RX ADMIN — THIAMINE HYDROCHLORIDE 500 MG: 100 INJECTION, SOLUTION INTRAMUSCULAR; INTRAVENOUS at 14:42

## 2024-04-28 RX ADMIN — LACTULOSE 30 G: 20 SOLUTION ORAL at 08:46

## 2024-04-28 RX ADMIN — SODIUM CHLORIDE 75 ML/HR: 0.9 INJECTION, SOLUTION INTRAVENOUS at 14:42

## 2024-04-28 RX ADMIN — PANTOPRAZOLE SODIUM 40 MG: 40 TABLET, DELAYED RELEASE ORAL at 05:57

## 2024-04-28 RX ADMIN — THIAMINE HYDROCHLORIDE 500 MG: 100 INJECTION, SOLUTION INTRAMUSCULAR; INTRAVENOUS at 06:13

## 2024-04-28 RX ADMIN — CARVEDILOL 6.25 MG: 6.25 TABLET, FILM COATED ORAL at 15:46

## 2024-04-28 RX ADMIN — RIFAXIMIN 550 MG: 550 TABLET ORAL at 08:47

## 2024-04-28 RX ADMIN — FOLIC ACID 1 MG: 1 TABLET ORAL at 08:46

## 2024-04-28 RX ADMIN — THIAMINE HYDROCHLORIDE 500 MG: 100 INJECTION, SOLUTION INTRAMUSCULAR; INTRAVENOUS at 22:14

## 2024-04-28 RX ADMIN — MULTIPLE VITAMINS W/ MINERALS TAB 1 TABLET: TAB ORAL at 08:46

## 2024-04-28 RX ADMIN — SODIUM CHLORIDE 75 ML/HR: 0.9 INJECTION, SOLUTION INTRAVENOUS at 02:51

## 2024-04-28 RX ADMIN — SODIUM CHLORIDE 75 ML/HR: 0.9 INJECTION, SOLUTION INTRAVENOUS at 22:16

## 2024-04-28 RX ADMIN — MAGNESIUM SULFATE HEPTAHYDRATE 4 G: 40 INJECTION, SOLUTION INTRAVENOUS at 08:46

## 2024-04-28 RX ADMIN — FUROSEMIDE 40 MG: 40 TABLET ORAL at 08:46

## 2024-04-28 RX ADMIN — CARVEDILOL 6.25 MG: 6.25 TABLET, FILM COATED ORAL at 08:46

## 2024-04-28 RX ADMIN — SPIRONOLACTONE 100 MG: 100 TABLET, FILM COATED ORAL at 08:47

## 2024-04-28 NOTE — ASSESSMENT & PLAN NOTE
Total bilirubin has been steadily increasin.01 -> /13 -> 8.21 on   In light of alcoholic cirrhosis   Will continue to monitor at this time  Patient jaundiced  Appreciate medicine/GI recommendations

## 2024-04-28 NOTE — CASE MANAGEMENT
CM met with patient to discuss progress and continued D/C planning. Pt was unsure if he wants to go to IP rehab or an IOP program after discharge. Pt noted that he may need to start looking for a new place to live as building was sold. Pt noted he has an IOP- [facility]- near his home. CM noted Pt could go to Rehab and get connected to [facility] after discharge. Pt will discuss with his roommate who he noted is his best friend. Will need follow up discharge planning.

## 2024-04-28 NOTE — PLAN OF CARE
Problem: PHYSICAL THERAPY ADULT  Goal: Performs mobility at highest level of function for planned discharge setting.  See evaluation for individualized goals.  Description: Treatment/Interventions: ADL retraining, Functional transfer training, LE strengthening/ROM, Therapeutic exercise, Elevations, Endurance training, Patient/family training, Bed mobility, Equipment eval/education, Gait training, Spoke to nursing, Spoke to case management, OT          See flowsheet documentation for full assessment, interventions and recommendations.  Outcome: Progressing  Note: Prognosis: Good  Problem List: Decreased strength, Decreased range of motion, Decreased endurance, Impaired balance, Decreased coordination, Decreased mobility, Impaired sensation     Barriers to Discharge: Inaccessible home environment, Decreased caregiver support     Rehab Resource Intensity Level, PT: No post-acute rehabilitation needs    See flowsheet documentation for full assessment.         Final Anesthesia Post-op Assessment    Patient: Matt Da Silva  Procedure(s) Performed: POLYPECTOMY  Anesthesia type: MAC    Vitals Value Taken Time   Temp 36.7 °C (98 °F) 01/29/21 1044   Pulse 78 01/29/21 1055   Resp 21 01/29/21 1055   SpO2 96 % 01/29/21 1055   /81 01/29/21 1055         Patient Location: Phase II  Post-op Vital Signs:stable  Level of Consciousness: participates in exam, alert and awake  Respiratory Status: spontaneous ventilation  Cardiovascular blood pressure returned to baseline  Hydration: euvolemic  Pain Management: well controlled  Vomiting: none   Nausea: None  Airway Patency:patent  Post-op Assessment: awake, alert, appropriately conversant, or baseline, no complications and patient tolerated procedure well with no complications      No complications documented.

## 2024-04-28 NOTE — ASSESSMENT & PLAN NOTE
Patient with a history of HTN  Home medication regimen: Coreg BID, spironolactone and Lasix daily  BP hypertensive at 150/106 upon arrival to the unit in the setting of acute alcohol withdrawal and chronic HTN  Most Recent Blood Pressure: 129/69    Will continue home medications given multiple medical comorbidities  Continue monitoring BP

## 2024-04-28 NOTE — PHYSICAL THERAPY NOTE
Physical Therapy Evaluation    Patient's Name: Curt Dominguez    Admitting Diagnosis  Alcohol intoxication (HCC)    Problem List  Patient Active Problem List   Diagnosis    Alcohol abuse    Pleural effusion, right    Alcoholic cirrhosis (HCC)    Thrombocytopenia (HCC)    Ambulatory dysfunction    Bipolar depression (HCC)    SERGEI (obstructive sleep apnea)    Elevated INR    Serum total bilirubin elevated    Lactic acidosis    Macrocytic anemia    Hypokalemia    Hypertension    GERD (gastroesophageal reflux disease)    Alcohol withdrawal syndrome with complication (HCC)    Alcohol use disorder, severe, dependence (HCC)    Hypomagnesemia    Normocytic anemia, not due to blood loss    Prolonged Q-T interval on ECG       Past Medical History  Past Medical History:   Diagnosis Date    Acute respiratory failure with hypoxia (HCC) 03/20/2024    Alcohol abuse     Depression     Dysphagia     Fracture of one rib, left side, initial encounter for closed fracture     GERD (gastroesophageal reflux disease)     Hypertension 03/22/2024    Hypokalemia     Hyponatremia 03/20/2024    Lactic acid acidosis 03/20/2024    Liver failure (HCC)     Muscle wasting and atrophy, not elsewhere classified, multiple sites     Muscle weakness     SERGEI (obstructive sleep apnea)     Other disorders of bilirubin metabolism     Pleural effusion     Sacral fracture (HCC)     SIRS (systemic inflammatory response syndrome) (HCC) 03/20/2024    Splenic rupture        Past Surgical History  Past Surgical History:   Procedure Laterality Date    BRAIN SURGERY      IR CHEST TUBE PLACEMENT  3/20/2024       Recent Imaging  No orders to display       Recent Vital Signs  Vitals:    04/27/24 2312 04/28/24 0400 04/28/24 0800 04/28/24 1128   BP: 123/74 129/69 111/54 107/61   BP Location: Right arm Right arm Left arm Left arm   Pulse: 78 79 71 69   Resp: 18 18 18 18   Temp: 98.2 °F (36.8 °C) 98.9 °F (37.2 °C) 98 °F (36.7 °C) 97.8 °F (36.6 °C)   TempSrc: Temporal  Temporal Temporal Temporal   SpO2: 95% 96% 95% 93%   Weight:       Height:            04/28/24 1035   PT Last Visit   PT Visit Date 04/28/24   Note Type   Note type Evaluation   Pain Assessment   Pain Assessment Tool 0-10   Pain Score No Pain   Restrictions/Precautions   Weight Bearing Precautions Per Order No   Other Precautions Multiple lines;Telemetry   Home Living   Type of Home Apartment   Home Layout One level;Stairs to enter with rails  (1+1)   Bathroom Shower/Tub Tub/shower unit   Bathroom Toilet Standard   Prior Function   Level of [place] Independent with ADLs;Independent with functional mobility   Lives With Alone;Other (Comment)   Receives Help From Friend(s);Family   Vocational On disability   General   Family/Caregiver Present No   Cognition   Overall Cognitive Status WFL   Arousal/Participation Alert   Orientation Level Oriented X4   Memory Within functional limits   Following Commands Follows all commands and directions without difficulty   RLE Assessment   RLE Assessment   (4/5)   LLE Assessment   LLE Assessment   (4/5)   Coordination   Movements are Fluid and Coordinated 0   Coordination and Movement Description general decreased gross motor coordinaiton, decreased balance in standign with ambulation   Sensation X   Light Touch   RLE Light Touch Impaired   LLE Light Touch Impaired   Bed Mobility   Supine to Sit 6  Modified independent   Additional items Increased time required   Sit to Supine 6  Modified independent   Additional items Increased time required   Transfers   Sit to Stand 5  Supervision   Additional items Increased time required   Stand to Sit 5  Supervision   Additional items Increased time required   Ambulation/Elevation   Gait pattern Step through pattern;Decreased toe off;Decreased heel strike;Decreased foot clearance;Improper Weight shift   Gait Assistance 5  Supervision   Additional items Verbal cues   Assistive Device None   Distance 100ft   Balance   Static Sitting Fair +    Dynamic Sitting Fair +   Static Standing Fair   Dynamic Standing Fair -   Ambulatory Fair -   Endurance Deficit   Endurance Deficit Yes   Endurance Deficit Description reduced vs baseline   Activity Tolerance   Activity Tolerance Patient limited by fatigue   Medical Staff Made Aware spoke to Cm   Nurse Made Aware spoke ot RN   Assessment   Prognosis Good   Problem List Decreased strength;Decreased range of motion;Decreased endurance;Impaired balance;Decreased coordination;Decreased mobility;Impaired sensation   Barriers to Discharge Inaccessible home environment;Decreased caregiver support   Goals   Patient Goals to get better and go to ETOH rehab   STG Expiration Date 05/08/24   Short Term Goal #1 see eval note   PT Treatment Day 0   Plan   Treatment/Interventions ADL retraining;Functional transfer training;LE strengthening/ROM;Therapeutic exercise;Elevations;Endurance training;Patient/family training;Bed mobility;Equipment eval/education;Gait training;Spoke to nursing;Spoke to case management;OT   PT Frequency 2-3x/wk   Discharge Recommendation   Rehab Resource Intensity Level, PT No post-acute rehabilitation needs   AM-PAC Basic Mobility Inpatient   Turning in Flat Bed Without Bedrails 4   Lying on Back to Sitting on Edge of Flat Bed Without Bedrails 4   Moving Bed to Chair 3   Standing Up From Chair Using Arms 3   Walk in Room 3   Climb 3-5 Stairs With Railing 3   Basic Mobility Inpatient Raw Score 20   Basic Mobility Standardized Score 43.99   [facility] Highest Level Of Mobility   -HLM Goal 6: Walk 10 steps or more   -HLM Achieved 7: Walk 25 feet or more   End of Consult   Patient Position at End of Consult Bedside chair;All needs within reach       ASSESSMENT                                                                                                                     Curt Dominguez is a 53 y.o. male admitted to [facility] on 4/26/2024 for Alcohol withdrawal syndrome with complication (HCC). Pt  has  a past medical history of Acute respiratory failure with hypoxia ([facility]) (03/20/2024), Alcohol abuse, Depression, Dysphagia, Fracture of one rib, left side, initial encounter for closed fracture, GERD (gastroesophageal reflux disease), Hypertension (03/22/2024), Hypokalemia, Hyponatremia (03/20/2024), Lactic acid acidosis (03/20/2024), Liver failure ([facility]), Muscle wasting and atrophy, not elsewhere classified, multiple sites, Muscle weakness, SERGEI (obstructive sleep apnea), Other disorders of bilirubin metabolism, Pleural effusion, Sacral fracture ([facility]), SIRS (systemic inflammatory response syndrome) ([facility]) (03/20/2024), and Splenic rupture.. PT was consulted and pt was seen on 4/28/2024 for mobility assessment and d/c planning.  Impairments limiting pt at this time include decreased ROM, impaired balance, decreased endurance, decreased coordination, increased fall risk, decreased sensation, and decreased strength. Pt is currently functioning at a modified independent assistance level for bed mobility, supervision assistance x1 level for transfers, supervision assistance x1 level for ambulation with no assistive device. The patient's -[facility] Basic Mobility Inpatient Short Form Raw Score is 20. A Raw score of greater than 16 suggests the patient may benefit from discharge to home. Please also refer to the recommendation of the Physical Therapist for safe discharge planning.    Goals                                                                                                                                    1) Bed mobility skills with modified independent assistance to facilitate safe return to previous living environment 2) Functional transfers with modified independent assistance to facilitate safe return to previous living environment  3) Ambulation with least restrictive AD modified independent assistance without LOB and stable vitals for safe ambulation home/ community distances. 4) Stair training up/down flight 3  step/s with appropriate rail/s  and modified independent assistance for safe access to previous living environment. 5) Improve balance grades to fair + to reduce risk of falls. 6)Improve LE strength grades by 1 to increase independence w/ transfers and gait.  7) PT for ongoing pt and family education; DME needs and D/C planning to promote highest level of function in least restrictive environment.     Recommendations                                                                                                              Pt will benefit from continued skilled IP PT to address the above mentioned impairments in order to maximize recovery and increase functional independence when completing mobility and ADLs. See flow sheet for goals and POC.     DME: None    Discharge Disposition:  Home with no needs      Cole White PT, DPT

## 2024-04-28 NOTE — PROGRESS NOTES
PROGRESS NOTE  DEPARTMENT OF MEDICAL TOXICOLOGY  LEVEL 4 MEDICAL DETOX UNIT  Curt Dominguez 53 y.o. male MRN: [id number]  Unit/Bed#: [address] [place] [address] [id number] Encounter: [id number]      Reason for Admission/Principal Problem: EtOH WD in setting of complicated pmhx  Rounding Provider: Darryl Darby DO  Attending Provider: Brunilda Felix MD   4/26/2024  8:24 PM           No new Assessment & Plan notes have been filed under this hospital service since the last note was generated.  Service: Medical Toxicology          VTE Pharmacologic Prophylaxis:   Pharmacologic:  thrombocytopenia  Mechanical VTE Prophylaxis in Place: no    Code Status: Level 1 - Full Code    Patient Centered Rounds: I have performed bedside rounds with nursing staff today.    Discussions with Specialists or Other Care Team Provider: CM     Education and Discussions with Family / Patient:   EtOH WD    Time Spent for Care: 20 minutes.  More than 50% of total time spent on counseling and coordination of care as described above.    Current Length of Stay: 2 day(s)    Current Patient Status: Inpatient     Certification Statement: The patient will continue to require additional inpatient hospital stay due to EtOh in setting of complicated medical history  Discharge Plan: Patient will require inpatient evaluation by [facility] +/- GI before cleared for discharge.      Subjective:   Patient remained HD stable overnight. Of concern is a recent bump in Tbili from 7.13 -> 8.21. Albumin continues to trend down from prior 2.5 to 1.8 now. Given these trends will pursue consult w/ internal medicine w/ potential GI consult based on their recommendations. On examination he remains jaundiced with a clear tremor at baseline. His abdomen is non-distended, however he has minimal tenderness w/ palpation. Overall will continue monitoring his abdominal exam. Additionally he has a large ecchymotic area on his lumbar spine that appears to be draining and is tender to the touch.  Given patient's thrombocytopenia likely c/w hematoma but will cover w/ bacitrain and continue observing for s/s infection.    Objective:     Clinical Opiate Withdrawal Scale  Pulse: 79    SEWS Total Score: 0 (2024  4:00 AM)        Last 24 Hours Medication List:   Current Facility-Administered Medications   Medication Dose Route Frequency Provider Last Rate    acetaminophen  650 mg Oral Q6H PRN Bela Shields PA-C      aluminum-magnesium hydroxide-simethicone  30 mL Oral Q6H PRN Bela Shields PA-C      bacitracin  1 large application Topical Once Darryl Dillon [place] DO [place]      carvedilol  6.25 mg Oral BID With Meals Bela Shields PA-C      folic acid  1 mg Oral Daily Bela Shields PA-C      furosemide  40 mg Oral Daily Bela Shields PA-C      lactulose  30 g Oral Daily Bela Shields PA-C      lamoTRIgine  25 mg Oral Daily Bela Shields PA-C      magnesium sulfate  4 g Intravenous Once Bela Shields PA-C 4 g (24 0846)    melatonin  6 mg Oral HS PRN Bela Shields PA-C      multivitamin-minerals  1 tablet Oral Daily Bela Shields PA-C      ondansetron  4 mg Oral Q6H PRN Bela Shields PA-C      pantoprazole  40 mg Oral Early Morning Bela Shields PA-C      rifaximin  550 mg Oral Daily Bela Shields PA-C      sodium chloride  75 mL/hr Intravenous Continuous Bela Shields PA-C 75 mL/hr (24 0251)    spironolactone  100 mg Oral Daily Bela Shields PA-C      thiamine  500 mg Intravenous Q8H ADRIAN Bela Shields PA-C 500 mg (24 0613)         Vitals:   Temp (24hrs), Av.1 °F (36.7 °C), Min:97.6 °F (36.4 °C), Max:98.9 °F (37.2 °C)    Temp:  [97.6 °F (36.4 °C)-98.9 °F (37.2 °C)] 98.9 °F (37.2 °C)  HR:  [74-82] 79  Resp:  [18] 18  BP: (110-129)/(67-74) 129/69  SpO2:  [95 %-98 %] 96 %  Body mass index is 30.3 kg/m².     Input and Output Summary  (last 24 hours):  Intake/Output Summary (Last 24 hours) at 4/28/2024 0904  Last data filed at 4/28/2024 0251  Gross per 24 hour   Intake 1500 ml   Output 200 ml   Net 1300 ml       Physical Exam:   Physical Exam  Vitals and nursing note reviewed.   Constitutional:       General: He is not in acute distress.     Appearance: Normal appearance. He is not ill-appearing, toxic-appearing or diaphoretic.   HENT:      Head: Normocephalic and atraumatic.   Eyes:      General: Scleral icterus present.         Right eye: No discharge.         Left eye: No discharge.      Extraocular Movements: Extraocular movements intact.      Conjunctiva/sclera: Conjunctivae normal.   Cardiovascular:      Rate and Rhythm: Normal rate.      Pulses: Normal pulses.      Heart sounds: Normal heart sounds. No murmur heard.     No friction rub. No gallop.   Pulmonary:      Effort: Pulmonary effort is normal. No respiratory distress.      Breath sounds: Normal breath sounds. No stridor. No wheezing, rhonchi or rales.      Comments: Severely diminished breath sounds on the R  Abdominal:      General: Abdomen is flat. Bowel sounds are normal. There is no distension.      Palpations: Abdomen is soft.      Tenderness: There is no abdominal tenderness. There is no guarding or rebound.   Musculoskeletal:         General: No swelling. Normal range of motion.      Cervical back: Normal range of motion. No rigidity.      Right lower leg: Edema present.      Left lower leg: Edema present.      Comments: 1+ BL LE edema   Skin:     General: Skin is warm and dry.      Capillary Refill: Capillary refill takes less than 2 seconds.      Coloration: Skin is not jaundiced.      Findings: Bruising present. No lesion.      Comments: Jaundiced, large ecchymotic area on lower back w/ bogginess c/w hematoma vs. Abscess.    Neurological:      General: No focal deficit present.      Mental Status: He is alert and oriented to person, place, and time. Mental status is at  baseline.      Comments: Abnormal finger-to-nose, tremor noted at baseline   Psychiatric:         Mood and Affect: Mood normal.         Behavior: Behavior normal.         Thought Content: Thought content normal.         Judgment: Judgment normal.         Additional Data:     Labs:   Results from last 7 days   Lab Units 04/28/24  0557 04/27/24  0450 04/26/24  1301   WBC Thousand/uL 5.13   < > 6.01   HEMOGLOBIN g/dL 9.5*   < > 10.9*   HEMATOCRIT % 28.0*   < > 32.2*   PLATELETS Thousands/uL 83*   < > 94*   SEGS PCT %  --   --  42*   LYMPHO PCT %  --   --  34   MONO PCT %  --   --  13*   EOS PCT %  --   --  10*    < > = values in this interval not displayed.      Results from last 7 days   Lab Units 04/28/24  0557   SODIUM mmol/L 135   POTASSIUM mmol/L 3.7   CHLORIDE mmol/L 107   CO2 mmol/L 24   BUN mg/dL 7   CREATININE mg/dL 0.74   ANION GAP mmol/L 4   CALCIUM mg/dL 7.2*   ALBUMIN g/dL 1.8*   TOTAL BILIRUBIN mg/dL 8.21*   ALK PHOS U/L 70   ALT U/L 10   AST U/L 31   GLUCOSE RANDOM mg/dL 106      Results from last 7 days   Lab Units 04/27/24  0523   INR  2.31*                Results from last 7 days   Lab Units 04/28/24  0557 04/27/24  0750 04/27/24  0450 04/26/24  2355   LACTIC ACID mmol/L 0.8 2.2* 2.4* 2.6*          * I Have Reviewed All Lab Data Listed Above.  * Additional Pertinent Lab Tests Reviewed: All Labs For Current Hospital Admission Reviewed      Imaging Studies: I have personally reviewed pertinent reports.        Recent Cultures (last 7 days):          Today, Patient Was Seen By: Darryl Darby DO    ** Please Note: Dictation voice to text software may have been used in the creation of this document. **

## 2024-04-28 NOTE — ASSESSMENT & PLAN NOTE
INR 2.15 on admission --> 2.31  In light of alcoholic cirrhosis   Continue to monitor  Appreciate medicine/GI recommendations

## 2024-04-28 NOTE — ASSESSMENT & PLAN NOTE
PLT 94 on admission --> 77  In the setting of chronic alcohol use  Daily thiamine/folic acid supplementation  Will hold Lovenox given PTL <100; SCDs currently in place  Continue monitoring CBC  Encourage alcohol cessation

## 2024-04-29 ENCOUNTER — TELEPHONE (OUTPATIENT)
Dept: PULMONOLOGY | Facility: CLINIC | Age: 54
End: 2024-04-29

## 2024-04-29 ENCOUNTER — APPOINTMENT (INPATIENT)
Dept: CT IMAGING | Facility: HOSPITAL | Age: 54
End: 2024-04-29
Payer: COMMERCIAL

## 2024-04-29 PROBLEM — F10.939 ALCOHOL WITHDRAWAL SYNDROME WITH COMPLICATION (HCC): Status: RESOLVED | Noted: 2024-04-26 | Resolved: 2024-04-29

## 2024-04-29 PROBLEM — E87.6 HYPOKALEMIA: Status: RESOLVED | Noted: 2024-03-22 | Resolved: 2024-04-29

## 2024-04-29 PROBLEM — E87.20 LACTIC ACIDOSIS: Status: RESOLVED | Noted: 2024-03-20 | Resolved: 2024-04-29

## 2024-04-29 PROBLEM — E83.42 HYPOMAGNESEMIA: Status: RESOLVED | Noted: 2024-04-26 | Resolved: 2024-04-29

## 2024-04-29 LAB
ALBUMIN SERPL BCP-MCNC: 1.9 G/DL (ref 3.5–5)
ALP SERPL-CCNC: 67 U/L (ref 34–104)
ALT SERPL W P-5'-P-CCNC: 11 U/L (ref 7–52)
ANION GAP SERPL CALCULATED.3IONS-SCNC: 4 MMOL/L (ref 4–13)
AST SERPL W P-5'-P-CCNC: 27 U/L (ref 13–39)
ATRIAL RATE: 65 BPM
ATRIAL RATE: 77 BPM
BILIRUB DIRECT SERPL-MCNC: 3.13 MG/DL (ref 0–0.2)
BILIRUB SERPL-MCNC: 7.72 MG/DL (ref 0.2–1)
BUN SERPL-MCNC: 9 MG/DL (ref 5–25)
CALCIUM ALBUM COR SERPL-MCNC: 8.9 MG/DL (ref 8.3–10.1)
CALCIUM SERPL-MCNC: 7.2 MG/DL (ref 8.4–10.2)
CHLORIDE SERPL-SCNC: 106 MMOL/L (ref 96–108)
CO2 SERPL-SCNC: 25 MMOL/L (ref 21–32)
CREAT SERPL-MCNC: 0.78 MG/DL (ref 0.6–1.3)
ERYTHROCYTE [DISTWIDTH] IN BLOOD BY AUTOMATED COUNT: 15 % (ref 11.6–15.1)
GFR SERPL CREATININE-BSD FRML MDRD: 104 ML/MIN/1.73SQ M
GLUCOSE SERPL-MCNC: 87 MG/DL (ref 65–140)
HCT VFR BLD AUTO: 28 % (ref 36.5–49.3)
HGB BLD-MCNC: 9.3 G/DL (ref 12–17)
INR PPP: 2.43 (ref 0.84–1.19)
MAGNESIUM SERPL-MCNC: 1.9 MG/DL (ref 1.9–2.7)
MCH RBC QN AUTO: 33.7 PG (ref 26.8–34.3)
MCHC RBC AUTO-ENTMCNC: 33.2 G/DL (ref 31.4–37.4)
MCV RBC AUTO: 101 FL (ref 82–98)
P AXIS: 48 DEGREES
P AXIS: 60 DEGREES
PLATELET # BLD AUTO: 80 THOUSANDS/UL (ref 149–390)
PMV BLD AUTO: 9.9 FL (ref 8.9–12.7)
POTASSIUM SERPL-SCNC: 3.6 MMOL/L (ref 3.5–5.3)
PR INTERVAL: 106 MS
PR INTERVAL: 108 MS
PROT SERPL-MCNC: 4.6 G/DL (ref 6.4–8.4)
PROTHROMBIN TIME: 26.7 SECONDS (ref 11.6–14.5)
QRS AXIS: 58 DEGREES
QRS AXIS: 72 DEGREES
QRSD INTERVAL: 98 MS
QRSD INTERVAL: 98 MS
QT INTERVAL: 434 MS
QT INTERVAL: 488 MS
QTC INTERVAL: 491 MS
QTC INTERVAL: 507 MS
RBC # BLD AUTO: 2.76 MILLION/UL (ref 3.88–5.62)
SODIUM SERPL-SCNC: 135 MMOL/L (ref 135–147)
T WAVE AXIS: 27 DEGREES
T WAVE AXIS: 5 DEGREES
VENTRICULAR RATE: 65 BPM
VENTRICULAR RATE: 77 BPM
WBC # BLD AUTO: 6.28 THOUSAND/UL (ref 4.31–10.16)

## 2024-04-29 PROCEDURE — 85027 COMPLETE CBC AUTOMATED: CPT

## 2024-04-29 PROCEDURE — 82248 BILIRUBIN DIRECT: CPT

## 2024-04-29 PROCEDURE — 93005 ELECTROCARDIOGRAM TRACING: CPT

## 2024-04-29 PROCEDURE — 80053 COMPREHEN METABOLIC PANEL: CPT

## 2024-04-29 PROCEDURE — 99291 CRITICAL CARE FIRST HOUR: CPT

## 2024-04-29 PROCEDURE — 74176 CT ABD & PELVIS W/O CONTRAST: CPT

## 2024-04-29 PROCEDURE — 71250 CT THORAX DX C-: CPT

## 2024-04-29 PROCEDURE — 93010 ELECTROCARDIOGRAM REPORT: CPT | Performed by: INTERNAL MEDICINE

## 2024-04-29 PROCEDURE — 83735 ASSAY OF MAGNESIUM: CPT

## 2024-04-29 PROCEDURE — 85610 PROTHROMBIN TIME: CPT

## 2024-04-29 PROCEDURE — NC001 PR NO CHARGE: Performed by: EMERGENCY MEDICINE

## 2024-04-29 RX ORDER — GINSENG 100 MG
1 CAPSULE ORAL DAILY
Status: DISCONTINUED | OUTPATIENT
Start: 2024-04-29 | End: 2024-05-01 | Stop reason: HOSPADM

## 2024-04-29 RX ADMIN — LACTULOSE 30 G: 20 SOLUTION ORAL at 08:12

## 2024-04-29 RX ADMIN — FOLIC ACID 1 MG: 1 TABLET ORAL at 08:12

## 2024-04-29 RX ADMIN — BACITRACIN 1 LARGE APPLICATION: 500 OINTMENT TOPICAL at 08:14

## 2024-04-29 RX ADMIN — SPIRONOLACTONE 100 MG: 100 TABLET, FILM COATED ORAL at 12:17

## 2024-04-29 RX ADMIN — PANTOPRAZOLE SODIUM 40 MG: 40 TABLET, DELAYED RELEASE ORAL at 05:04

## 2024-04-29 RX ADMIN — RIFAXIMIN 550 MG: 550 TABLET ORAL at 08:20

## 2024-04-29 RX ADMIN — MULTIPLE VITAMINS W/ MINERALS TAB 1 TABLET: TAB ORAL at 08:13

## 2024-04-29 RX ADMIN — FUROSEMIDE 40 MG: 40 TABLET ORAL at 12:18

## 2024-04-29 RX ADMIN — THIAMINE HYDROCHLORIDE 500 MG: 100 INJECTION, SOLUTION INTRAMUSCULAR; INTRAVENOUS at 05:04

## 2024-04-29 RX ADMIN — THIAMINE HYDROCHLORIDE 500 MG: 100 INJECTION, SOLUTION INTRAMUSCULAR; INTRAVENOUS at 14:33

## 2024-04-29 RX ADMIN — RIFAXIMIN 550 MG: 550 TABLET ORAL at 21:19

## 2024-04-29 RX ADMIN — CARVEDILOL 6.25 MG: 6.25 TABLET, FILM COATED ORAL at 12:17

## 2024-04-29 RX ADMIN — LAMOTRIGINE 25 MG: 25 TABLET ORAL at 08:13

## 2024-04-29 NOTE — ASSESSMENT & PLAN NOTE
Pt with a h/o chronic heavy alcohol use with intermittent period of sobriety over the years  Currently drinks 3 large cans (24 oz) of beer daily  Denies H/o withdrawal seizures  Interested in Vivitrol injections at this time as long as liver function is acceptable  Withdrawal management as above  Discontinue IVFs  Completed high dose thiamine regimen  Continue folic acid, and MVI  Consult case management/CRS for assistance with aftercare resources - pt interested in IP rehabilitation at this time

## 2024-04-29 NOTE — ASSESSMENT & PLAN NOTE
Lab Results   Component Value Date    WBC 6.28 04/29/2024    HGB 9.3 (L) 04/29/2024    HCT 28.0 (L) 04/29/2024     (H) 04/29/2024    PLT 80 (L) 04/29/2024      Possibly 2/2 iron deficiency/malnutrition v. Anemia of chronic disease given hx of cirrhosis  Iron panel pending - will initiate supplementation if indicated  Daily multivitamin supplementation  Continue monitoring CBC and transfuse if indicated

## 2024-04-29 NOTE — PROGRESS NOTES
"  Progress Note - Medical Toxicology    Curt Dominguez 53 y.o. male MRN: [id number]  Unit/Bed#:  DETOX 504-01 Encounter: [id number]  MEDICAL DETOX UNIT, LEVEL 4  Department of Medical Toxicology  Reason for Admission/Principal Problem: Decompensated Alcoholic Cirrhosis, Alcohol withdrawal   Rounding Provider: Denisse Vásquez PA-C, Brunilda Felix MD         Alcoholic cirrhosis (HCC)  Assessment & Plan  Pt with a h/o alcoholic cirrhosis  Exam 4/29/24: + jaundice, + scleral icterus, +3 b/l pitting edema, b/l diminished breath sounds, abdomen distention  Vital Signs: patient is hypotensive unable to receive Lasix or Coreg this morning  Plan: D/C fluids as patient is appearing volume overloaded with known right pleural effusion. Obtain CT CAP wo contrast . Will reach out to GI for further recommendations, consider transfer to higher level of care.   CT chest/abd/pelvis 4/26 revealed, \"Hepatic cirrhosis and evidence of portal hypertension. Probable cavernous transformation of the main portal vein without evidence of acute thrombosis\"  Recent RUQ U/S 3/21/24 with findings also suggestive of portal HTN  Elevated PT/INR and PTT   Reviewed recent GI consult note from 3/21/24 from previous admission:  \"Child Class C  Life Expectancy :  1-3 years  Abdominal surgery thoams-operative mortality: 82%\"  Pleural effusion likely transudative in origin and due to hepatic hydrothorax; however, pt poor candidate for TIPS procedure due to h/o hepatic encephalopathy  Will continue their recommendations including home medications of spironolactone, Lasix, lactulose, and Rifaximin, as well as low-salt diet  Calculated MELD-Na score 25 on this admission  Estimated 90-day mortality 14-15%  Continue to monitor CMP and PT/INR in AM  SLIM consulted at this time due to pt's medical complexity, will appreciate recommendations  Will reach out to [facility]IM for possible transfer to medicine as patient is no longer exhibiting withdrawal symptoms. "   Encourage alcohol cessation   Recommend outpatient follow-up GI for ongoing monitoring/maintenance     Prolonged Q-T interval on ECG  Assessment & Plan  EKG in the ED revealed QTc 491 ms --> 515  Repeat EKG in the AM  Avoid QT prolonging agents  Telemetry monitoring during acute withdrawal     Normocytic anemia, not due to blood loss  Assessment & Plan  Lab Results   Component Value Date    WBC 6.28 04/29/2024    HGB 9.3 (L) 04/29/2024    HCT 28.0 (L) 04/29/2024     (H) 04/29/2024    PLT 80 (L) 04/29/2024      Possibly 2/2 iron deficiency/malnutrition v. Anemia of chronic disease given hx of cirrhosis  Iron panel pending - will initiate supplementation if indicated  Daily multivitamin supplementation  Continue monitoring CBC and transfuse if indicated     Alcohol use disorder, severe, dependence (HCC)  Assessment & Plan  Pt with a h/o chronic heavy alcohol use with intermittent period of sobriety over the years  Currently drinks 3 large cans (24 oz) of beer daily  Denies H/o withdrawal seizures  Interested in Vivitrol injections at this time as long as liver function is acceptable  Withdrawal management as above  Discontinue IVFs  Completed high dose thiamine regimen  Continue folic acid, and MVI  Consult case management/CRS for assistance with aftercare resources - pt interested in IP rehabilitation at this time     GERD (gastroesophageal reflux disease)  Assessment & Plan  Continue daily PPI     Elevated INR  Assessment & Plan  INR 2.15 on admission --> 2.31  In light of alcoholic cirrhosis   Continue to monitor  Appreciate medicine/GI recommendations    Bipolar depression (HCC)  Assessment & Plan  Patient with a h/o bipolar disorder  Home medications include Prozac and Lamictal daily  Denies SI/HI/thoughts of self harm  Continue home medications  Recommend OP f/u with PCP/OP Psychiatry     Ambulatory dysfunction  Assessment & Plan  Pt with a h/o ambulatory dysfunction and frequent falls at home  No  ataxia on examination   Completed high dose thiamine Q8H x9 doses  PT eval- home with no needs   Fall precautions in place    Thrombocytopenia (HCC)  Assessment & Plan  PLT 94 on admission --> 77  In the setting of chronic alcohol use  Daily thiamine/folic acid supplementation  Will hold Lovenox given PTL <100; SCDs currently in place  Continue monitoring CBC  Encourage alcohol cessation     Pleural effusion, right  Assessment & Plan  Pt with a h/o recent R pleural effusion with hospitalization 3/20-3/29/24 requiring chest tube from 3/20-3/24 with additional 2 L pleural fluid drained by CC team  R pleural effusion was re-demonstrated on CT C/A/P 4/26 but noted to be smaller in size compared to previous CT  Currently endorsing to shortness of breath  Repeat imaging   Continue to monitor VS, respiratory status    Hypokalemia-resolved as of 4/29/2024  Assessment & Plan  Mild, with K 3.4 on initial labs --> 3.5  Repleted with KCl 40 mEq PO on admission  Continue monitoring and replete electrolytes as indicated     * Alcohol withdrawal syndrome with complication (HCC)-resolved as of 4/29/2024  Assessment & Plan  Patient with a history of chronic daily alcohol use  Last drink 4/26 around 1100  Serum alcohol 362 in the ED (4/26, 1301)  Discontinue SEWS protocol for medical management of alcohol withdrawal  Denies further withdrawal symptoms  Received a total of 840 mg of phenobarbital             VTE Pharmacologic Prophylaxis:   Pharmacologic: Pharmacologic VTE Prophylaxis contraindicated due to thrombocytopenia  Mechanical VTE Prophylaxis in Place: yes    Code Status: Level 1 - Full Code    Patient Centered Rounds: I have performed bedside rounds with nursing staff today.    Discussions with Specialists or Other Care Team Provider: GI, Attending Dr. Felix    Education and Discussions with Family / Patient: I personally answered all of the patient's questions to the best of my ability     Time Spent for Care: 30 minutes.   More than 50% of total time spent on counseling and coordination of care as described above.    Current Length of Stay: 3 day(s)    Current Patient Status: Inpatient     Certification Statement: The patient will continue to require additional inpatient hospital stay due to monitoring labs, GI consult, repeat imaging for worsening edema  Discharge Plan: TBD      Total Critical Care time spent 35 minutes excluding procedures, teaching and family updates.      Subjective:   Patient examined at bedside. Denies further withdrawal symptoms. Reports shortness of breath this AM and worsening swelling in lower extremities. Denies any chest pain or abdominal pain.     Objective:     Clinical Opiate Withdrawal Scale  Pulse: 69    SEWS Total Score: 0 (4/28/2024  8:00 AM)        Last 24 Hours Medication List:   Current Facility-Administered Medications   Medication Dose Route Frequency Provider Last Rate    acetaminophen  650 mg Oral Q6H PRN Bela Shields PA-C      aluminum-magnesium hydroxide-simethicone  30 mL Oral Q6H PRN Bela Shields PA-C      bacitracin  1 large application Topical Daily Bela Shields PA-C      carvedilol  6.25 mg Oral BID With Meals Bela Shields PA-C      folic acid  1 mg Oral Daily Bela Shields PA-C      furosemide  40 mg Oral Daily Bela Shields PA-C      lactulose  30 g Oral Daily Bela Shields PA-C      lamoTRIgine  25 mg Oral Daily Bela Shields, CHIARA      melatonin  6 mg Oral HS PRN Bela Shields PA-C      multivitamin-minerals  1 tablet Oral Daily Bela Shields PA-C      ondansetron  4 mg Oral Q6H PRN Bela Shields PA-C      pantoprazole  40 mg Oral Early Morning Bela Shields PA-C      rifaximin  550 mg Oral Q12H [facility] Leta Felder PA-C      spironolactone  100 mg Oral Daily Bela Shields PA-C      thiamine  500 mg Intravenous Q8H [facility] Bela Shields PA-C  Stopped (24 0535)         Vitals:   Temp (24hrs), Av.1 °F (36.7 °C), Min:97.6 °F (36.4 °C), Max:99 °F (37.2 °C)    Temp:  [97.6 °F (36.4 °C)-99 °F (37.2 °C)] 97.8 °F (36.6 °C)  HR:  [66-84] 69  Resp:  [18-20] 18  BP: ()/(52-66) 96/52  SpO2:  [93 %-98 %] 93 %  Body mass index is 31.57 kg/m².     Input and Output Summary (last 24 hours):  Intake/Output Summary (Last 24 hours) at 2024 1105  Last data filed at 2024 2100  Gross per 24 hour   Intake 720 ml   Output 125 ml   Net 595 ml       Physical Exam:   Physical Exam  Constitutional:       General: He is not in acute distress.     Appearance: He is ill-appearing. He is not diaphoretic.   Cardiovascular:      Rate and Rhythm: Normal rate and regular rhythm.      Heart sounds: No murmur heard.  Pulmonary:      Breath sounds: Examination of the right-upper field reveals decreased breath sounds. Examination of the right-middle field reveals decreased breath sounds. Examination of the left-middle field reveals decreased breath sounds. Examination of the right-lower field reveals decreased breath sounds. Examination of the left-lower field reveals decreased breath sounds. Decreased breath sounds present.   Abdominal:      General: There is distension.      Tenderness: There is no abdominal tenderness.   Neurological:      Mental Status: He is alert and oriented to person, place, and time.      Motor: No tremor.      Coordination: Coordination normal.   Psychiatric:         Mood and Affect: Mood is not anxious or depressed.         Additional Data:     Labs: keep all most recent labs as listed on admission templates   Results from last 7 days   Lab Units 24  0435 24  0450 24  1301   WBC Thousand/uL 6.28   < > 6.01   HEMOGLOBIN g/dL 9.3*   < > 10.9*   HEMATOCRIT % 28.0*   < > 32.2*   PLATELETS Thousands/uL 80*   < > 94*   SEGS PCT %  --   --  42*   LYMPHO PCT %  --   --  34   MONO PCT %  --   --  13*   EOS PCT %  --   --  10*    < >  = values in this interval not displayed.      Results from last 7 days   Lab Units 04/29/24  0435   SODIUM mmol/L 135   POTASSIUM mmol/L 3.6   CHLORIDE mmol/L 106   CO2 mmol/L 25   BUN mg/dL 9   CREATININE mg/dL 0.78   ANION GAP mmol/L 4   CALCIUM mg/dL 7.2*   ALBUMIN g/dL 1.9*   TOTAL BILIRUBIN mg/dL 7.72*   ALK PHOS U/L 67   ALT U/L 11   AST U/L 27   GLUCOSE RANDOM mg/dL 87      Results from last 7 days   Lab Units 04/29/24  0435   INR  2.43*                Results from last 7 days   Lab Units 04/28/24  0557 04/27/24  0750 04/27/24  0450 04/26/24  2355   LACTIC ACID mmol/L 0.8 2.2* 2.4* 2.6*          * I Have Reviewed All Lab Data Listed Above.  * Additional Pertinent Lab Tests Reviewed: All Labs For Current Hospital Admission Reviewed      Imaging Studies: I have personally reviewed pertinent reports.        Recent Cultures (last 7 days):          Today, Patient Was Seen By: Denisse Vásquez PA-C    ** Please Note: Dictation voice to text software may have been used in the creation of this document. **

## 2024-04-29 NOTE — UTILIZATION REVIEW
Initial Clinical Review    Pt initially presented to [facility] ED. Pt was transferred by EMS to [facility] for its Level IV medically managed intensive inpatient detox unit, not available at [facility].    Admission: Date/Time/Statement:   Admission Orders (From admission, onward)       Ordered        04/26/24 2035  Inpatient Admission  Once                          Orders Placed This Encounter   Procedures    Inpatient Admission     Standing Status:   Standing     Number of Occurrences:   1     Order Specific Question:   Level of Care     Answer:   Med Surg [16]     Order Specific Question:   Estimated length of stay     Answer:   More than 2 Midnights     Order Specific Question:   Certification     Answer:   I certify that inpatient services are medically necessary for this patient for a duration of greater than two midnights. See H&P and MD Progress Notes for additional information about the patient's course of treatment.     ED Arrival Information       Patient not seen in ED                       No chief complaint on file.      Initial Presentation: 53 y.o. male who presented to medical detox. Inpatient admission for evaluation and treatment of alcohol withdrawal syndrome. Presented w/ need for detox from alcohol. Serum ETOH: 362 4/26 1301 . Reports 3 large cans 24 oz beer daily, last drink on 4/26 @ 1100. Reports chronic alcohol use for many years with periods of sobriety, was supposed to go to inpatient alcohol rehab today but drank 1.5 beers , was walking around apartment with unsteady gait, roommate called 911.  Reports no hx of withdrawal seizures. On exam, remor, pt reports anxiety and insomnia . SEWS 8. Plan: SEWS monitoring w/ phenobarbital management, high-dose IV thiamine/folic acid supplement, IVF, telemetry, continuous pulse ox, continue PTA meds, trend labs, replete electrolytes as needed.       Date: 4/27       Day 2: Pt reports  slightly decreased appetite, . On exam, chronically ill appearing , dry mucous membranes abdominal tenderness + guarding , jaundiced  mild finger to nose ataxia . SEWS 8. Plan: continue SEWS monitoring w/ phenobarbital management, high-dose IV thiamine/folic acid supplement, telemetry, continuous pulse ox, continue current meds, trend labs, replete electrolytes as needed.     ED Triage Vitals   Temperature Pulse Respirations Blood Pressure SpO2   04/26/24 2040 04/26/24 2040 04/26/24 2040 04/26/24 2040 04/26/24 2040   97.7 °F (36.5 °C) 88 16 (!) 150/106 97 %      Temp Source Heart Rate Source Patient Position - Orthostatic VS BP Location FiO2 (%)   04/26/24 2040 04/26/24 2040 04/26/24 2040 04/26/24 2040 --   Temporal Monitor Sitting Right arm       Pain Score       04/26/24 2024       No Pain          Wt Readings from Last 1 Encounters:   04/26/24 93.1 kg (205 lb 3.2 oz)     Vital Signs:   ate/Time Temp Pulse Resp BP MAP (mmHg) SpO2 O2 Device Patient Position - Orthostatic VS   04/29/24 0809 97.6 °F (36.4 °C) 84 20 93/53 -- 97 % None (Room air) Sitting   04/27/24 2312 98.2 °F (36.8 °C) 78 18 123/74 90 95 % None (Room air) Lying   04/27/24 2000 97.8 °F (36.6 °C) 74 18 120/70 -- 95 % None (Room air) Lying   04/27/24 1456 -- 80 18 113/72 -- 98 % None (Room air) Lying   04/27/24 1100 97.6 °F (36.4 °C) 82 18 110/67 -- 95 % None (Room air) Lying   04/27/24 0801 97.2 °F (36.2 °C) Abnormal  88 18 127/77 93 97 % None (Room air) Lying   04/27/24 0425 97.7 °F (36.5 °C) 91 16 121/67 85 92 % None (Room air) Lying   04/27/24 0250 -- 89 16 -- -- 92 % None (Room air) --   04/27/24 0142 98.6 °F (37 °C) 80 16 122/82 95 96 % None (Room air) Lying       Severity of Ethanol Withdrawal Scale (SEWS):      Row Name 04/27/24 1440 04/27/24 1300 04/27/24 1200 04/27/24 1130 04/27/24 0759   Severity of Ethanol Withdrawal Scale (SEWS)   ANXIETY: Do you feel that something bad is about to happen to you right now? 0  -TN 0  -TN 0  -TN 3  -TN 3  -TN    NAUSEA and DRY HEAVES or VOMITING? 0  -TN 0  -TN 0  -TN 3  -TN 0  -TN   SWEATING: (includes moist palms, sweating now)? Score 0 or 2 0  -TN 0  -TN 0  -TN 0  -TN 2  -TN   TREMOR: with arms extended eyes closed? 2  -TN 0  -TN 0  -TN 2  -TN 2  -TN   AGITATION: Fidgety, restless, pacing? 0  -TN 0  -TN 0  -TN 0  -TN 0  -TN   DISORIENTATION: 0  -TN 0  -TN 0  -TN 0  -TN 0  -TN   HALLUCINATIONS: 0  -TN 0  -TN 0  -TN 0  -TN 0  -TN   VITAL SIGNS: ANY (Pulse >110, Diastolic BP >90, Temp >99.6) 0  -TN 0  -TN 0  -TN 0  -TN 0  -TN   SEWS Total Score 2  -TN 0  -TN 0  -TN 8  -TN 7  -TN   Guzman Agitation Sedation Scale (RASS)   Guzman Agitation Sedation Scale (RASS) -1  -TN -2  -TN -2  -TN 0  -TN 0  -TN   Row Name 04/27/24 0645 04/27/24 0545 04/27/24 0428 04/27/24 0251 04/27/24 0148   Severity of Ethanol Withdrawal Scale (SEWS)   ANXIETY: Do you feel that something bad is about to happen to you right now? 0  -NT 0  -NT 0  -NT 0  -NT 0  -NT   NAUSEA and DRY HEAVES or VOMITING? 0  -NT 0  -NT 0  -NT 0  -NT 0  -NT   SWEATING: (includes moist palms, sweating now)? Score 0 or 2 0  -NT 0  -NT 0  -NT 0  -NT 2  -NT   TREMOR: with arms extended eyes closed? 0  -NT 0  -NT 2  -NT 0  -NT 2  -NT   AGITATION: Fidgety, restless, pacing? 0  -NT 0  -NT 0  -NT 0  -NT 0  -NT   DISORIENTATION: 0  -NT 0  -NT 0  -NT 0  -NT 0  -NT   HALLUCINATIONS: 0  -NT 0  -NT 0  -NT 0  -NT 0  -NT   VITAL SIGNS: ANY (Pulse >110, Diastolic BP >90, Temp >99.6) 0  -NT 0  -NT 0  -NT 0  -NT 0  -NT   SEWS Total Score 0  -NT 0  -NT 2  -NT 0  -NT 4  -NT   Guzman Agitation Sedation Scale (RASS)   Guzman Agitation Sedation Scale (RASS) -2  -NT -2  -NT -1  -NT -2  -NT 0  -NT     Row Name 04/26/24 2346 04/26/24 2220 04/26/24 2048     Severity of Ethanol Withdrawal Scale (SEWS)   ANXIETY: Do you feel that something bad is about to happen to you right now? 0  -NT 3  -NT 3  -NT     NAUSEA and DRY HEAVES or VOMITING? 0  -NT 0  -NT 0  -NT     SWEATING: (includes moist palms,  sweating now)? Score 0 or 2 0  -NT 0  -NT 0  -NT     TREMOR: with arms extended eyes closed? 0  -NT 2  -NT 2  -NT     AGITATION: Fidgety, restless, pacing? 0  -NT 0  -NT 0  -NT     DISORIENTATION: 0  -NT 0  -NT 0  -NT     HALLUCINATIONS: 0  -NT 0  -NT 0  -NT     VITAL SIGNS: ANY (Pulse >110, Diastolic BP >90, Temp >99.6) 0  -NT 0  -NT 3  -NT     SEWS Total Score 0  -NT 5  -NT 8  -NT     Guzman Agitation Sedation Scale (RASS)   Guzman Agitation Sedation Scale (RASS) 0  -NT 0  -NT 0  -NT           Pertinent Labs/Diagnostic Test Results:      EKG 4/24/24, 0433: normal sinus rhythm, ventricular rate 87 bpm, no acute ST segment/T wave changes, normal axis and RI/QRS intervals, prolonged QTc interval at 515 ms. Compared to previous tracing, nonspecific ST abnormality is no longer present and QTc prolongation has worsened.     No orders to display     Results from last 7 days   Lab Units 04/26/24  1301   SARS-COV-2  Negative     Results from last 7 days   Lab Units 04/29/24 0435 04/28/24  0557 04/27/24  0450 04/26/24  1301   WBC Thousand/uL 6.28 5.13 5.47 6.01   HEMOGLOBIN g/dL 9.3* 9.5* 9.6* 10.9*   HEMATOCRIT % 28.0* 28.0* 27.7* 32.2*   PLATELETS Thousands/uL 80* 83* 77* 94*   TOTAL NEUT ABS Thousands/µL  --   --   --  2.51         Results from last 7 days   Lab Units 04/29/24 0435 04/28/24  0557 04/27/24  0450 04/26/24  2105 04/26/24  1301   SODIUM mmol/L 135 135 140 140 139   POTASSIUM mmol/L 3.6 3.7 3.5 3.5 3.4*   CHLORIDE mmol/L 106 107 106 107 104   CO2 mmol/L 25 24 25 23 28   ANION GAP mmol/L 4 4 9 10 7   BUN mg/dL 9 7 5 5 4*   CREATININE mg/dL 0.78 0.74 0.69 0.71 0.67   EGFR ml/min/1.73sq m 104 106 109 108 111   CALCIUM mg/dL 7.2* 7.2* 7.3* 7.4* 7.7*   MAGNESIUM mg/dL 1.9 1.8* 2.3 1.5*  --      Results from last 7 days   Lab Units 04/29/24  0435 04/28/24  0557 04/27/24  0450 04/26/24  2105 04/26/24  1301   AST U/L 27 31 40* 38 42*   ALT U/L 11 10 14 14 17   ALK PHOS U/L 67 70 75 81 87   TOTAL PROTEIN g/dL  4.6* 4.7* 4.9* 5.4* 5.9*   ALBUMIN g/dL 1.9* 1.8* 2.0* 2.2* 2.5*   TOTAL BILIRUBIN mg/dL 7.72* 8.21* 7.13* 7.01* 7.73*   BILIRUBIN DIRECT mg/dL 3.13* 3.24*  --  2.52*  --    AMMONIA umol/L  --   --   --   --  57         Results from last 7 days   Lab Units 04/29/24  0435 04/28/24  0557 04/27/24  0450 04/26/24  2105 04/26/24  1301   GLUCOSE RANDOM mg/dL 87 106 86 103 98               Results from last 7 days   Lab Units 04/27/24  0450   CK TOTAL U/L 75     Results from last 7 days   Lab Units 04/26/24  1442 04/26/24  1301   HS TNI 0HR ng/L  --  6   HS TNI 2HR ng/L 6  --    HSTNI D2 ng/L 0  --          Results from last 7 days   Lab Units 04/29/24  0435 04/27/24  0523 04/26/24  1301   PROTIME seconds 26.7* 25.7* 24.4*   INR  2.43* 2.31* 2.15*   PTT seconds  --   --  43*             Results from last 7 days   Lab Units 04/28/24  0557 04/27/24  0750 04/27/24  0450 04/26/24  2355 04/26/24  2105 04/26/24  1550 04/26/24  1301   LACTIC ACID mmol/L 0.8 2.2* 2.4* 2.6* 2.1* 2.3* 2.2*             Results from last 7 days   Lab Units 04/26/24  1301   BNP pg/mL 105*     Results from last 7 days   Lab Units 04/27/24  0450   FERRITIN ng/mL 172   IRON ug/dL 134                 Results from last 7 days   Lab Units 04/26/24  1301   LIPASE u/L 47                 Results from last 7 days   Lab Units 04/26/24  1449   CLARITY UA  Clear   COLOR UA  Barbara   SPEC GRAV UA  <=1.005   PH UA  6.5   GLUCOSE UA mg/dl Negative   KETONES UA mg/dl Negative   BLOOD UA  Moderate*   PROTEIN UA mg/dl Negative   NITRITE UA  Negative   BILIRUBIN UA  Small*   UROBILINOGEN UA E.U./dl 4.0*   LEUKOCYTES UA  Negative   WBC UA /hpf None Seen   RBC UA /hpf 1-2   BACTERIA UA /hpf None Seen   EPITHELIAL CELLS WET PREP /hpf None Seen     Results from last 7 days   Lab Units 04/26/24  1301   INFLUENZA A PCR  Negative   INFLUENZA B PCR  Negative   RSV PCR  Negative         Results from last 7 days   Lab Units 04/26/24  1449   AMPH/METH  Negative   BARBITURATE UR   Negative   BENZODIAZEPINE UR  Negative   COCAINE UR  Negative   METHADONE URINE  Negative   OPIATE UR  Negative   PCP UR  Negative   THC UR  Negative     Results from last 7 days   Lab Units 04/26/24  1301   ETHANOL LVL mg/dL 362*                Past Medical History:   Diagnosis Date    Acute respiratory failure with hypoxia (HCC) 03/20/2024    Alcohol abuse     Depression     Dysphagia     Fracture of one rib, left side, initial encounter for closed fracture     GERD (gastroesophageal reflux disease)     Hypertension 03/22/2024    Hypokalemia     Hyponatremia 03/20/2024    Lactic acid acidosis 03/20/2024    Liver failure (HCC)     Muscle wasting and atrophy, not elsewhere classified, multiple sites     Muscle weakness     SERGEI (obstructive sleep apnea)     Other disorders of bilirubin metabolism     Pleural effusion     Sacral fracture (HCC)     SIRS (systemic inflammatory response syndrome) (HCC) 03/20/2024    Splenic rupture      Present on Admission:   Alcoholic cirrhosis (HCC)   Ambulatory dysfunction   Bipolar depression (HCC)   SERGEI (obstructive sleep apnea)   Elevated INR   Serum total bilirubin elevated   Thrombocytopenia (HCC)   Hypokalemia   Hypertension   GERD (gastroesophageal reflux disease)   Lactic acidosis   Alcohol withdrawal syndrome with complication (HCC)   Alcohol use disorder, severe, dependence (HCC)   Pleural effusion, right   Hypomagnesemia   Normocytic anemia, not due to blood loss   Prolonged Q-T interval on ECG      Admitting Diagnosis: Alcohol intoxication (HCC)  Age/Sex: 53 y.o. male  Admission Orders:  Regular 2 gm Na  Diet. SCDs.  Fall & Seizure Precautions.  SEWS monitoring.  Telemetry & Continuous Pulse Ox.    Scheduled Medications:  bacitracin, 1 large application, Topical, Daily  carvedilol, 6.25 mg, Oral, BID With Meals  folic acid, 1 mg, Oral, Daily  furosemide, 40 mg, Oral, Daily  lactulose, 30 g, Oral, Daily  lamoTRIgine, 25 mg, Oral, Daily  multivitamin-minerals, 1 tablet,  Oral, Daily  pantoprazole, 40 mg, Oral, Early Morning  rifaximin, 550 mg, Oral, Daily  spironolactone, 100 mg, Oral, Daily  thiamine, 500 mg, Intravenous, Q8H ADRIAN      Continuous IV Infusions:  sodium chloride, 75 mL/hr, Intravenous, Continuous      PRN Meds:  acetaminophen, 650 mg, Oral, Q6H PRN  aluminum-magnesium hydroxide-simethicone, 30 mL, Oral, Q6H PRN  melatonin, 6 mg, Oral, HS PRN 4/27 x1, 4/28 x 1   ondansetron, 4 mg, Oral, Q6H PRN    Magnesium 4 gm /100 ml IV 4/26 2248, 4/28 0846    Magnesium 2gm /50 ml 4/27 1306     phenobarbital, 260 mg, Intravenous; 4/26 2117  phenobarbital, 130 mg, Intravenous; 4/27 0823 4/27 1143   phenobarbital, 64.8 mg, Oral; 4/26 2239, 4/27 0156,  4/27 0439 4/27 1443 4/27 1639        IP CONSULT TO CASE MANAGEMENT  CONSULT TO CERTIFIED   IP CONSULT TO INTERNAL MEDICINE  IP CONSULT TO GASTROENTEROLOGY    Network Utilization Review Department  ATTENTION: Please call with any questions or concerns to [phone] and carefully listen to the prompts so that you are directed to the right person. All voicemails are confidential.   For Discharge needs, contact Care Management DC Support Team at [phone] opt. [phone]  Send all requests for admission clinical reviews, approved or denied determinations and any other requests to dedicated fax number below belonging to the campus where the patient is receiving treatment. List of dedicated fax numbers for the Facilities:  FACILITY NAME UR FAX NUMBER   ADMISSION DENIALS (Administrative/Medical Necessity) [phone]   DISCHARGE SUPPORT TEAM (NETWORK) [phone]   PARENT CHILD HEALTH (Maternity/NICU/Pediatrics) [phone]   [facility] [phone]   [facility] [phone]   [facility] [phone]   [facility] [phone]   [facility] [phone]   [facility]  [phone]   [facility] [phone]   [facility] [phone]   [facility] [phone]   [facility] [phone]   [facility] [phone]   [facility] [phone]

## 2024-04-29 NOTE — ASSESSMENT & PLAN NOTE
Pt with a h/o recent R pleural effusion with hospitalization 3/20-3/29/24 requiring chest tube from 3/20-3/24 with additional 2 L pleural fluid drained by CC team  R pleural effusion was re-demonstrated on CT C/A/P 4/26 but noted to be smaller in size compared to previous CT  Currently endorsing to shortness of breath  Repeat imaging   Continue to monitor VS, respiratory status

## 2024-04-29 NOTE — TELEPHONE ENCOUNTER
----- Message from Agustín Paul MD sent at 4/29/2024  1:39 PM EDT -----  Regarding: Hospital follow-up  Hi please make an appt for this patient with Edi Rodriguez or Mona Goodman for around 3 weeks from now.

## 2024-04-29 NOTE — ASSESSMENT & PLAN NOTE
Pt with a h/o ambulatory dysfunction and frequent falls at home  No ataxia on examination   Completed high dose thiamine Q8H x9 doses  PT eval- home with no needs   Fall precautions in place

## 2024-04-29 NOTE — ASSESSMENT & PLAN NOTE
Patient with a history of chronic daily alcohol use  Last drink 4/26 around 1100  Serum alcohol 362 in the ED (4/26, 1301)  Discontinue [facility] protocol for medical management of alcohol withdrawal  Denies further withdrawal symptoms  Received a total of 840 mg of phenobarbital

## 2024-04-29 NOTE — CERTIFIED RECOVERY SPECIALIST
Certified  Note    Patient name: Curt Dominguez  Location: 5T DETOX 504/5T DETOX 50*  [place]: [facility]  Attending:  Brunilda Felix MD MRN [id number]  : 1970  Age: 53 y.o.    Sex: male Date 2024         Substance Use History:     Social History     Substance and Sexual Activity   Alcohol Use Yes    Comment: Every other day        Social History     Substance and Sexual Activity   Drug Use Not Currently     CRS attempted to engage, patient resting comfortably. CRS will continue to follow        Cary Chino

## 2024-04-29 NOTE — CONSULTS
Patient MRN: [id number]  Date of Service: 4/29/2024  Referring Provider: Bela Shields PA-C   Provider Creating Note: Leta Felder PA-C  PCP: Leonela Oseguera    Reason for Consult:   Serum total bilirubin elevated [R17]  - Primary      Alcoholic cirrhosis, unspecified whether ascites present (HCC) [K70.30]      Elevated INR [R79.1]      Normocytic anemia, not due to blood loss [D64.9]        HISTORY OF PRESENT ILLNESS:  Curt Dominguez is a 53 y.o. male with PMH including AUD, decompensated alcoholic cirrhosis complicated by hepatic encephalopathy, ascites, possible SBP hx, noncompliance. Splenic artery embolization 7/2023 2/2 trauma at [facility] complicated by splenic necrosis with gas.   Admitted last month with pleural effusion suspected hepatic hydrothorax s/p chest tube. He was evaluated by GI service; treated with diuretics, lactulose and low salt diet. Not a TIPS candidate 2/2 HE. MELD 29, MDF 97. ECHO 3/21 okay. Most recent EGD 11/2023 at [facility] showed no varices or PHG; repeat recommended 2025. HCC surveillance imaging done, no AFP in EMR.    He was discharged last month to SNF. Subsquently living with a roommate who called EMS due to multiple falls, alcohol intoxication, confusion. While in St- labs showed mild lactic acidosis, serum etoh 362. CT imaging showed posterior back SQ hematoma along with chronic findings of liver disease. Transferred 4/26 to  [facility]. He has had intermittent periods of sobriety in the past. Prior home medications were ordered (spironolactone, lasix, lactulose, xifaxan) and low salt diet. GI consults requested for rising bilirubin. During last admission, bilirubin peaked at 15.33 and has trended down overall since. There was mild elevation noted yesterday with improvement today (7.13>8.21>7.72). Patient hypotensive (93/53) this morning, on IVF 100mL/hr. He denies any GI complaints. No BMs today, had several loose stools after lactulose yesterday. No GI  bleeding reported.      Review of Systems:    General:   No fever or chills; No significant weight loss or gain. +ambulatory dysfunction   EENT:   No ear pain, facial swelling; No sneezing, sore throat.   Skin:   No rashes, +color changes (bruising, jaundice)   Respiratory:     No shortness of breath, cough, wheezing, stridor.   Cardiovascular:     No chest pain, palpitations.   Gastrointestinal:    As per HPI.   Musculoskeletal:     +arthralgias, myalgias, swelling.   Neurologic:   No dizziness, numbness +tremor, weakness. No speech difficulties.   Psych:   +depression, anxiety.  No agitation, suicidal ideations    Otherwise, All other twelve-point review of systems normal.     Past Medical History:   Diagnosis Date    Acute respiratory failure with hypoxia (HCC) 03/20/2024    Alcohol abuse     Depression     Dysphagia     Fracture of one rib, left side, initial encounter for closed fracture     GERD (gastroesophageal reflux disease)     Hypertension 03/22/2024    Hypokalemia     Hyponatremia 03/20/2024    Lactic acid acidosis 03/20/2024    Liver failure (HCC)     Muscle wasting and atrophy, not elsewhere classified, multiple sites     Muscle weakness     SERGEI (obstructive sleep apnea)     Other disorders of bilirubin metabolism     Pleural effusion     Sacral fracture (HCC)     SIRS (systemic inflammatory response syndrome) (HCC) 03/20/2024    Splenic rupture      Past Surgical History:   Procedure Laterality Date    BRAIN SURGERY      IR CHEST TUBE PLACEMENT  3/20/2024     Allergies   Allergen Reactions    Benazepril Cough     cough  cough         Medications:  Home Medications  Prior to Admission medications    Medication Sig Start Date End Date Taking? Authorizing Provider   carvedilol (COREG) 6.25 mg tablet Take 6.25 mg by mouth 2 (two) times a day with meals   Yes Historical Provider, MD   FLUoxetine (PROzac) 10 mg capsule Take 20 mg by mouth daily   Yes Historical Provider, MD   furosemide (LASIX) 40 mg  tablet Take 1 tablet (40 mg total) by mouth daily 3/29/24  Yes Lilly Rausch MD   lactulose 20 g/30 mL Take 30 g by mouth daily   Yes Historical Provider, MD   lamoTRIgine (LaMICtal) 25 mg tablet Take 25 mg by mouth daily   Yes Historical Provider, MD   omeprazole (PriLOSEC) 40 MG capsule Take 40 mg by mouth daily   Yes Historical Provider, MD   rifaximin (XIFAXAN) 550 mg tablet Take 550 mg by mouth in the morning   Yes Historical Provider, MD   spironolactone (ALDACTONE) 100 mg tablet Take 1 tablet (100 mg total) by mouth daily 3/29/24  Yes Lilly Rausch MD   folic acid (FOLVITE) 1 mg tablet Take 1 mg by mouth daily    Historical Provider, MD   Magnesium Oxide 400 MG CAPS Take 400 mg by mouth in the morning    Historical Provider, MD       Inhouse Medications    Current Facility-Administered Medications:     acetaminophen (TYLENOL) tablet 650 mg, 650 mg, Oral, Q6H PRN    aluminum-magnesium hydroxide-simethicone (MAALOX) oral suspension 30 mL, 30 mL, Oral, Q6H PRN    bacitracin topical ointment 1 large application, 1 large application, Topical, Daily, 1 large application at 04/29/24 0814    carvedilol (COREG) tablet 6.25 mg, 6.25 mg, Oral, BID With Meals, 6.25 mg at 04/28/24 1546    folic acid (FOLVITE) tablet 1 mg, 1 mg, Oral, Daily, 1 mg at 04/29/24 0812    furosemide (LASIX) tablet 40 mg, 40 mg, Oral, Daily, 40 mg at 04/28/24 0846    lactulose (CHRONULAC) oral solution 30 g, 30 g, Oral, Daily, 30 g at 04/29/24 0812    lamoTRIgine (LaMICtal) tablet 25 mg, 25 mg, Oral, Daily, 25 mg at 04/29/24 0813    melatonin tablet 6 mg, 6 mg, Oral, HS PRN, 6 mg at 04/28/24 2225    multivitamin-minerals (CENTRUM) tablet 1 tablet, 1 tablet, Oral, Daily, 1 tablet at 04/29/24 0813    ondansetron (ZOFRAN-ODT) dispersible tablet 4 mg, 4 mg, Oral, Q6H PRN    pantoprazole (PROTONIX) EC tablet 40 mg, 40 mg, Oral, Early Morning, 40 mg at 04/29/24 0504    rifaximin (XIFAXAN) tablet 550 mg, 550 mg, Oral, Daily, 550 mg at 04/29/24  "0820    sodium chloride 0.9 % infusion, 75 mL/hr, Intravenous, Continuous, 75 mL/hr at 04/28/24 2216    spironolactone (ALDACTONE) tablet 100 mg, 100 mg, Oral, Daily, 100 mg at 04/28/24 0847    thiamine (VITAMIN B1) 500 mg in sodium chloride 0.9 % 50 mL IVPB, 500 mg, Intravenous, Q8H ADRIAN, Stopped at 04/29/24 0535      Social History   reports that he has never smoked. He has never used smokeless tobacco. He reports current alcohol use. He reports that he does not currently use drugs.    Family History  No family history on file.      OBJECTIVE:    BP 93/53 (BP Location: Left arm)   Pulse 84   Temp 97.6 °F (36.4 °C) (Temporal)   Resp 20   Ht 5' 9\" (1.753 m)   Wt 93.1 kg (205 lb 3.2 oz)   SpO2 97%   BMI 30.30 kg/m²   Physical Exam:     General Appearance:    Awake, alert, oriented x3, no distress, ill-appearing +yawning repeatedly during evaluation   Head:    Normocephalic without obvious abnormality   Eyes:    +icterus, PERRL, EOM's intact        Neck:   Supple, no adenopathy   Throat:   Mucous membranes moist. Poor dentition   Lungs:     Clear to auscultation b/l anterior, no wheezing or rhonchi   Heart:    Regular rate and rhythm, +s1/s2   Abdomen:     Soft, non-tender, non-distended. bowel sounds active. No    masses, rebound or guarding.    Extremities:   Lower extremities with edema   Psych  Derm:   Depressed mood    +jaundice +tattoos +palmar erythema No spider angiomata   Neurologic:   CNII-XII grossly intact. Speech intact/slow. No asterixis. +tremor         Laboratory Studies:  Results from last 7 days   Lab Units 04/29/24  0435 04/28/24  0557 04/27/24  0523 04/27/24  0450 04/26/24  1301   WBC Thousand/uL 6.28 5.13  --  5.47 6.01   HEMOGLOBIN g/dL 9.3* 9.5*  --  9.6* 10.9*   HEMATOCRIT % 28.0* 28.0*  --  27.7* 32.2*   MCV fL 101* 99*  --  98 97   PLATELETS Thousands/uL 80* 83*  --  77* 94*   INR  2.43*  --  2.31*  --  2.15*     Results from last 7 days   Lab Units 04/29/24  0435 04/28/24  0557 " 04/27/24  0450 04/26/24  2105 04/26/24  1301   SODIUM mmol/L 135 135 140 140 139   POTASSIUM mmol/L 3.6 3.7 3.5 3.5 3.4*   CHLORIDE mmol/L 106 107 106 107 104   CO2 mmol/L 25 24 25 23 28   BUN mg/dL 9 7 5 5 4*   CREATININE mg/dL 0.78 0.74 0.69 0.71 0.67   CALCIUM mg/dL 7.2* 7.2* 7.3* 7.4* 7.7*   ALBUMIN g/dL 1.9* 1.8* 2.0* 2.2* 2.5*   TOTAL BILIRUBIN mg/dL 7.72* 8.21* 7.13* 7.01* 7.73*   ALK PHOS U/L 67 70 75 81 87   ALT U/L 11 10 14 14 17   AST U/L 27 31 40* 38 42*           Imaging and Other Studies:  CT recon only thoracolumbar    Result Date: 4/26/2024  Narrative: CT THORACIC AND LUMBAR SPINE INDICATION:   fall. Patient found down. Back pain. Confusion. COMPARISON: None TECHNIQUE: Axial CT examination of the thoracic and lumbar spine was obtained utilizing reconstructed images from CT of the chest abdomen and pelvis performed the same day. Images were reformatted in the sagittal and coronal planes. This examination, like all CT scans performed in the [facility], was performed utilizing techniques to minimize radiation dose exposure, including the use of iterative reconstruction and automated exposure control. FINDINGS: ALIGNMENT: No traumatic subluxation VERTEBRAE: No acute fracture DEGENERATIVE CHANGES: Mild discogenic and facet joint osteoarthritic changes. Chronic appearing deformity with linear lucencies through posterior elements at L3 level unchanged from prior study appearing chronic with sclerotic margins on both examinations. PREVERTEBRAL AND PARASPINAL SOFT TISSUES: Posterior back hematoma in diameter 2 cm image 5/167, and cephalocaudad length approximately 5 cm. No active extravasation OTHER: Please refer to separate CT chest abdomen and pelvis report     Impression: No acute fracture or traumatic subluxation. Posterior back subcutaneous hematoma without evidence of active extravasation Workstation performed: [id number]     CT cervical spine without contrast    Result Date:  "4/26/2024  Narrative: CT CERVICAL SPINE - WITHOUT CONTRAST INDICATION:   fall. Patient found down. Suspected trauma. Confusion. COMPARISON:  None. TECHNIQUE:  CT examination of the cervical spine was performed without intravenous contrast.  Contiguous axial images were obtained. Multiplanar 2D reformatted images were created from the source data. Radiation dose length product (DLP) for this visit:  424 mGy-cm .  This examination, like all CT scans performed in the [facility], was performed utilizing techniques to minimize radiation dose exposure, including the use of iterative reconstruction and automated exposure control. IMAGE QUALITY:  Diagnostic. FINDINGS: ALIGNMENT: No traumatic subluxation VERTEBRAE:  No fracture. DEGENERATIVE CHANGES: Discogenic degenerative changes of the cervical spine, at C5-C6, C6-C7, C7-T1. PREVERTEBRAL AND PARASPINAL SOFT TISSUES: No acute finding THORACIC INLET: Opacification of the visualized right upper hemithorax. Please refer to separate CT body report Lucency noted within the left lateral mass of C1, measuring 0.8 cm, image 2/46. This is of doubtful clinical significance in a patient without a history of primary malignancy and without lucent defects elsewhere.     Impression: No cervical spine fracture or traumatic malalignment. Workstation performed: [id number]     CT chest abdomen pelvis w contrast    Result Date: 4/26/2024  Narrative: CT CHEST, ABDOMEN AND PELVIS WITH IV CONTRAST INDICATION: \"etoh fall, brusing.\" Patient found down. Suspected trauma. Confusion. COMPARISON: 3/20/2024 TECHNIQUE: CT examination of the chest, abdomen and pelvis was performed. Multiplanar 2D reformatted images were created from the source data. This examination, like all CT scans performed in the [facility], was performed utilizing techniques to minimize radiation dose exposure, including the use of iterative reconstruction and automated exposure control. Radiation " dose length product (DLP) for this visit: 998 mGy-cm IV Contrast: 100 mL of iohexol (OMNIPAQUE) Enteric Contrast: Not administered. FINDINGS: CHEST LUNGS: Compressive atelectasis at the right lung base. PLEURA: Large right-sided pleural effusion. In comparison to prior study however, smaller in size, currently without evidence of midline shift. No pneumothorax. HEART/GREAT VESSELS: Mild coronary artery calcification.. Calcification in the region of the aortic valve. MEDIASTINUM AND RACQUEL: Unremarkable. CHEST WALL AND LOWER NECK: Bilateral gynecomastia. ABDOMEN LIVER/BILIARY TREE: Cirrhotic morphology. No evidence of acute traumatic injury. 1.5 cm water density cyst in the anterior parenchyma of the liver image 2/108. While not apparent on prior study, this was likely obscured previously by profound hepatic steatosis GALLBLADDER: Cholelithiasis. Gallbladder is mildly distended. No specific pericholecystic inflammatory change or ductal dilatation SPLEEN: Splenomegaly. Embolization coils are present. Heterogeneous density with evidence of previous splenic trauma and potentially areas of infarction related to prior embolization. Given differences in technique, no obvious change from prior study to suggest acute splenic injury. PANCREAS: Unremarkable. ADRENAL GLANDS: Unremarkable. KIDNEYS/URETERS: Unremarkable. No hydronephrosis. STOMACH AND BOWEL: Unremarkable. APPENDIX: No findings to suggest appendicitis. ABDOMINOPELVIC CAVITY: No significant ascites. No adenopathy. No free air. VESSELS: Extensive intra-abdominal varices/collateral vessels. Recanalization of the paraumbilical vein. Ventral hernia noted containing dilated collateral vessels. No AAA. Portal vein is not acutely thrombosed however there does appear to be at least 2 separate channels, suggesting recanalization/cavernous transformation PELVIS REPRODUCTIVE ORGANS: Unremarkable for patient's age. URINARY BLADDER: Unremarkable. ABDOMINAL WALL/INGUINAL REGIONS:  "Approximately 2 cm in size posterior subcutaneous hematoma, image 2/171. No evidence of active extravasation. Surrounding fat stranding. BONES: Chronic deformity of the posterior left 11th rib. No acute fractures are seen.     Impression: 1. No evidence of acute traumatic injury in the chest abdomen or pelvis 2. Large right-sided effusion, but diminished from prior CT. No pneumothorax 3. Hepatic cirrhosis and evidence of portal hypertension. 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 Splenomegaly with deformed shape and irregular hypodensities likely related to history of prior trauma/prior embolization procedure with areas of splenic infarction. No definite evidence of acute traumatic injury 5. 2 cm posterior back subcutaneous hematoma without evidence of active extravasation 6. Cholelithiasis within a mildly dilated gallbladder but without specific pericholecystic inflammatory change Workstation performed: [id number]     CT head without contrast    Result Date: 4/26/2024  Narrative: CT BRAIN - WITHOUT CONTRAST INDICATION:   \"Fall etoh.\" Patient fell down in apartment. Confusion. COMPARISON:  None. TECHNIQUE:  CT examination of the brain was performed.  Multiplanar 2D reformatted images were created from the source data. Radiation dose length product (DLP) for this visit:  885 mGy-cm .  This examination, like all CT scans performed in the [facility], was performed utilizing techniques to minimize radiation dose exposure, including the use of iterative reconstruction and automated exposure control. IMAGE QUALITY:  Diagnostic. FINDINGS: PARENCHYMA:  No intracranial mass, mass effect or midline shift. No CT signs of acute infarction.  No acute parenchymal hemorrhage. VENTRICLES AND EXTRA-AXIAL SPACES:  Normal for the patient's age. VISUALIZED ORBITS: Normal visualized orbits. PARANASAL SINUSES: Normal visualized paranasal sinuses. CALVARIUM AND " EXTRACRANIAL SOFT TISSUES: Prior right craniotomy. No acute finding     Impression: No acute intracranial abnormality. Workstation performed: [id number]         ASSESSMENT AND PLAN:  Decompensated alcoholic cirrhosis complicated by ascites/hepatic hydrothorax 3/2024, hepatic encephalopathy, pHTN, hx SBP. No history of variceal bleeding. Admitted with alcoholic hepatitis with elevated DF but improved from last month.   MDF = 65 (this adm) >> 75 today  MELD-Na =23 (this adm) >> 25 today  MELD = 23 (this adm) >> 24 today  Creatinine 0.67 (this adm)>>>0.78 today  Despite elevated DF >32, given improvement over the past month and medication noncompliance, at this time would favor holding off steroids. Will discuss plan with GI attending given continued rising protime. Continue to monitor. Absolute alcohol abstinence.   Liver transplant evaluation status - not likely transplant candidate given poor compliance and poor social support  Volume: ascites/edema controlled. No ascites noted on CT imaging, large R-sided pleural effusion improved from last month. No weight since admission, monitor daily. Currently on Lasix 40mg daily, spironolactone 100mg daily, and 2g Na diet. Hypotension concerning, will d/w GI attending.   Hepatic encephalopathy: currently controlled. Continue lactulose titrated to produce 2-3 soft stools daily and increase Xifaxan QD>> BID. Monitor clinically.    Esophageal varices: Last EGD 11/2023 without varices or PHG. Repeat 2025, sooner if active gi bleeding.   Labs: Sodium WNL, Liver enzymes WNL (except bilirubin primarily indirect),    CT C/A/P with IV contrast shows large R-sided effusion, diminished from prior CT. Probable cavernous transformation of the MPV without acute thrombosis. Splenomegaly with deformed shape and irregular hypdensities likely related to trauma/prior embolization procedure with areas of splenic infarction. Primary service considering repeat imaging.   Hepatoma screening: Imaging  UTD, check AFP.   Check viral hepatitis status. Further serologies as outpatient after recovery.   Colon Cancer screening: unknown.         Leta Felder PA-C

## 2024-04-29 NOTE — CONSULTS
PULMONOLOGY CONSULT NOTE     Name: Curt Dominguez   Age & Sex: 53 y.o. 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  He is asymptomatic right now and there is no diagnostic uncertainty as to the etiology of the effusion given it was a transudate, it is isolated R sided effusion, and it is in the setting of decompensated cirrhosis.  Would defer thoracentesis for now.  If/when he becomes symptomatic in the future he should undergo thoracentesis which can be done by IR  Otherwise I recommend optimizing his diuretic regimen- he is currently on lasix 40 mg daily and spironolactone 100 mg daily.  This could potentially be increased.  He is on a 2 g sodium limitation.  Adherence to this is important.  I do not recommend chest tube placement or ASEPT placement. Drainage of fluid frequently in the absence of symptoms will lead to protein-wasting and not provide benefit.  A TIPS could be of benefit but I will defer to gastroenterology/hepatology/IR about risks/benefits in setting of previous encephalopathy.    Discussed in person with the Advanced Practitioner Denisse Vásquez from the primary team.  I will message the office to arrange an outpatient follow-up in the [facility].        History of Present Illness   HPI:  Curt Dominguez is a 53 y.o. male with PMHx alcohol use disorder, decompensated cirrhosis due to alcohol use, recurrent R sided pleural effusion which is transudate due to  hepatic hydrothorax, TBI, anemia who presents4/26 with alcohol withdrawal and was admitted to the [facility] unit.    He clinically has improved from a withdrawal perspective.  He had a hospitalization 3/20-3/29 where he had a chest tube and thoracentesis.  Fluid was transudate.    Fluid studies- cytology negative, pH 7.4, LDH 91 (serum was 391), protein <3.0 -actually <0.1 on TP Low concentration check (serum 6.2), glucose 135    He currently has no dyspnea including with ambulation. No cough. No fever/chills.  He is a never smoker. No drugs.    Review of systems:  12 point review of systems was completed and was otherwise negative except as listed in HPI.      Historical Information   Past Medical History:   Diagnosis Date    Acute respiratory failure with hypoxia (HCC) 03/20/2024    Alcohol abuse     Depression     Dysphagia     Fracture of one rib, left side, initial encounter for closed fracture     GERD (gastroesophageal reflux disease)     Hypertension 03/22/2024    Hypokalemia     Hyponatremia 03/20/2024    Lactic acid acidosis 03/20/2024    Liver failure (HCC)     Muscle wasting and atrophy, not elsewhere classified, multiple sites     Muscle weakness     SERGEI (obstructive sleep apnea)     Other disorders of bilirubin metabolism     Pleural effusion     Sacral fracture (HCC)     SIRS (systemic inflammatory response syndrome) (HCC) 03/20/2024    Splenic rupture      Past Surgical History:   Procedure Laterality Date    BRAIN SURGERY      IR CHEST TUBE PLACEMENT  3/20/2024     No family history on file.      Social History:   Social History     Tobacco Use   Smoking Status Never   Smokeless Tobacco Never         Meds/Allergies   Current Facility-Administered Medications   Medication Dose Route Frequency    acetaminophen (TYLENOL) tablet 650 mg  650 mg Oral Q6H PRN    aluminum-magnesium hydroxide-simethicone (MAALOX) oral suspension 30 mL  30 mL Oral Q6H PRN    bacitracin topical ointment 1 large application   "1 large application Topical Daily    carvedilol (COREG) tablet 6.25 mg  6.25 mg Oral BID With Meals    folic acid (FOLVITE) tablet 1 mg  1 mg Oral Daily    furosemide (LASIX) tablet 40 mg  40 mg Oral Daily    lactulose (CHRONULAC) oral solution 30 g  30 g Oral Daily    lamoTRIgine (LaMICtal) tablet 25 mg  25 mg Oral Daily    melatonin tablet 6 mg  6 mg Oral HS PRN    multivitamin-minerals (CENTRUM) tablet 1 tablet  1 tablet Oral Daily    ondansetron (ZOFRAN-ODT) dispersible tablet 4 mg  4 mg Oral Q6H PRN    pantoprazole (PROTONIX) EC tablet 40 mg  40 mg Oral Early Morning    rifaximin (XIFAXAN) tablet 550 mg  550 mg Oral Q12H ADRIAN    spironolactone (ALDACTONE) tablet 100 mg  100 mg Oral Daily    thiamine (VITAMIN B1) 500 mg in sodium chloride 0.9 % 50 mL IVPB  500 mg Intravenous Q8H ADRIAN     Medications Prior to Admission   Medication    carvedilol (COREG) 6.25 mg tablet    FLUoxetine (PROzac) 10 mg capsule    furosemide (LASIX) 40 mg tablet    lactulose 20 g/30 mL    lamoTRIgine (LaMICtal) 25 mg tablet    omeprazole (PriLOSEC) 40 MG capsule    rifaximin (XIFAXAN) 550 mg tablet    spironolactone (ALDACTONE) 100 mg tablet    folic acid (FOLVITE) 1 mg tablet    Magnesium Oxide 400 MG CAPS     Allergies   Allergen Reactions    Benazepril Cough     cough  cough         Vitals: Blood pressure 119/69, pulse 65, temperature 98.5 °F (36.9 °C), temperature source Temporal, resp. rate 18, height 5' 9\" (1.753 m), weight 97 kg (213 lb 12.8 oz), SpO2 94%., room air, Body mass index is 31.57 kg/m².      Intake/Output Summary (Last 24 hours) at 4/29/2024 1313  Last data filed at 4/29/2024 0942  Gross per 24 hour   Intake 960 ml   Output 125 ml   Net 835 ml       Physical Exam  Vitals and nursing note reviewed.   Constitutional:       General: He is not in acute distress.     Appearance: He is well-developed.   HENT:      Head: Normocephalic and atraumatic.   Eyes:      General: Scleral icterus present.      Conjunctiva/sclera: " Conjunctivae normal.   Cardiovascular:      Rate and Rhythm: Normal rate and regular rhythm.      Heart sounds: No murmur heard.  Pulmonary:      Effort: Pulmonary effort is normal. No respiratory distress.      Comments: Clear on L.  Absent R base and R mid lung fields  Abdominal:      Palpations: Abdomen is soft.      Tenderness: There is no abdominal tenderness.   Musculoskeletal:         General: No swelling.      Cervical back: Neck supple.   Skin:     General: Skin is warm and dry.      Capillary Refill: Capillary refill takes less than 2 seconds.      Coloration: Skin is jaundiced.   Neurological:      Mental Status: He is alert.   Psychiatric:         Mood and Affect: Mood normal.         Labs: I have personally reviewed pertinent lab results.  Laboratory and Diagnostics  Results from last 7 days   Lab Units 04/29/24  0435 04/28/24  0557 04/27/24  0450 04/26/24  1301   WBC Thousand/uL 6.28 5.13 5.47 6.01   HEMOGLOBIN g/dL 9.3* 9.5* 9.6* 10.9*   HEMATOCRIT % 28.0* 28.0* 27.7* 32.2*   PLATELETS Thousands/uL 80* 83* 77* 94*   SEGS PCT %  --   --   --  42*   MONO PCT %  --   --   --  13*   EOS PCT %  --   --   --  10*     Results from last 7 days   Lab Units 04/29/24  0435 04/28/24  0557 04/27/24  0450 04/26/24  2105 04/26/24  1301   SODIUM mmol/L 135 135 140 140 139   POTASSIUM mmol/L 3.6 3.7 3.5 3.5 3.4*   CHLORIDE mmol/L 106 107 106 107 104   CO2 mmol/L 25 24 25 23 28   ANION GAP mmol/L 4 4 9 10 7   BUN mg/dL 9 7 5 5 4*   CREATININE mg/dL 0.78 0.74 0.69 0.71 0.67   CALCIUM mg/dL 7.2* 7.2* 7.3* 7.4* 7.7*   GLUCOSE RANDOM mg/dL 87 106 86 103 98   ALT U/L 11 10 14 14 17   AST U/L 27 31 40* 38 42*   ALK PHOS U/L 67 70 75 81 87   ALBUMIN g/dL 1.9* 1.8* 2.0* 2.2* 2.5*   TOTAL BILIRUBIN mg/dL 7.72* 8.21* 7.13* 7.01* 7.73*     Results from last 7 days   Lab Units 04/29/24  0435 04/28/24  0557 04/27/24  0450 04/26/24  2105   MAGNESIUM mg/dL 1.9 1.8* 2.3 1.5*      Results from last 7 days   Lab Units 04/29/24  0435  "04/27/24  0523 04/26/24  1301   INR  2.43* 2.31* 2.15*   PTT seconds  --   --  43*          Results from last 7 days   Lab Units 04/28/24  0557 04/27/24  0750 04/27/24  0450 04/26/24  2355 04/26/24  2105 04/26/24  1550 04/26/24  1301   LACTIC ACID mmol/L 0.8 2.2* 2.4* 2.6* 2.1* 2.3* 2.2*     Results from last 7 days   Lab Units 04/27/24  0450   FERRITIN ng/mL 172                 Component      Latest Ref Rng 3/20/2024   Total Counted 100    Neutrophils % (Fluid)      % 2    Lymphs % (Fluid)      % 9    Eosinophils % (Fluid)      % 1    Basophils % (Fluid)      % 1    Mesothelial % (Fluid)      % 3    Histiocyte % (Fluid)      % 79    Monocytes % (Fluid)      % 5    Site Right Pleural Fluid    Color, Fluid      Clear, Colorless,Yellow  Barbara    Clarity, Fluid      Clear  Cloudy !    WBC, Fluid      /ul 127    GLUCOSE FLUID      mg/dL 135    LACTATE DEHYDROGENASE FLUID      U/L 91    PH BODY FLUID 7.4    Protein, Fluid      g/dL <3.0    Protein, Fluid      g/dL <0.1       Legend:  ! Abnormal        Microbiology:        ABG: No results found for: \"PHART\", \"AQZ8JJU\", \"PO2ART\", \"MNU0IAE\", \"M2URTNPH\", \"BEART\", \"SOURCE\"      Imaging and other studies: I have personally reviewed pertinent reports.   and I have personally reviewed pertinent films in PACS      CT chest abdomen pelvis wo contrast  Result Date: 4/29/2024  Impression: 1.  Unchanged large right pleural effusion with partial atelectasis of the right lower lobe. No new findings in the chest. 2.  Cholelithiasis. Gallbladder wall thickening is nonspecific in the setting of liver disease and volume overload, but acute cholecystitis cannot be excluded in the appropriate clinical setting. This may be evaluated with right upper quadrant ultrasound  if indicated. 3.  Mild peripancreatic stranding may be related to venous congestion in setting of portal hypertension but should be correlated clinically for evidence of acute pancreatitis. 4.  Cirrhosis with portal " hypertension. 5.  Mild bladder wall thickening versus prominence from under distention. Correlate for evidence of cystitis. 6.  Stable small posterior subcutaneous hematoma.     CT chest abdomen pelvis w contrast  Result Date: 4/26/2024  Impression: 1. No evidence of acute traumatic injury in the chest abdomen or pelvis 2. Large right-sided effusion, but diminished from prior CT. No pneumothorax 3. Hepatic cirrhosis and evidence of portal hypertension. 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 Splenomegaly with deformed shape and irregular hypodensities likely related to history of prior trauma/prior embolization procedure with areas of splenic infarction. No definite evidence of acute traumatic injury 5. 2 cm posterior back subcutaneous hematoma without evidence of active extravasation 6. Cholelithiasis within a mildly dilated gallbladder but without specific pericholecystic inflammatory change     CT head without contrast  Result Date: 4/26/2024  Impression: No acute intracranial abnormality.         Pulmonary function testing: None    EKG, Pathology, and Other Studies:   I have personally reviewed pertinent reports.         Code Status: Level 1 - Full Code        Agustín Paul MD  Attending Physician  Pulmonary and Critical Care Medicine

## 2024-04-29 NOTE — PROCEDURES
EKG 4/29/24, 0442: overall unchanged from previous tracings, normal sinus rhythm with short NC interval (106 ms), ventricular rate 65 bpm, no acute ischemic changes, normal axis, QRS 98 ms, prolonged QTc interval at 507 ms. Compared to previous tracing from 4/28/24, PVCs are no longer present.

## 2024-04-29 NOTE — ASSESSMENT & PLAN NOTE
"Pt with a h/o alcoholic cirrhosis  Exam 4/29/24: + jaundice, + scleral icterus, +3 b/l pitting edema, b/l diminished breath sounds, abdomen distention  Vital Signs: patient is hypotensive unable to receive Lasix or Coreg this morning  Plan: D/C fluids as patient is appearing volume overloaded with known right pleural effusion. Obtain CT CAP wo contrast . Will reach out to GI for further recommendations, consider transfer to higher level of care.   CT chest/abd/pelvis 4/26 revealed, \"Hepatic cirrhosis and evidence of portal hypertension. Probable cavernous transformation of the main portal vein without evidence of acute thrombosis\"  Recent RUQ U/S 3/21/24 with findings also suggestive of portal HTN  Elevated PT/INR and PTT   Reviewed recent GI consult note from 3/21/24 from previous admission:  \"Child Class C  Life Expectancy :  1-3 years  Abdominal surgery thomas-operative mortality: 82%\"  Pleural effusion likely transudative in origin and due to hepatic hydrothorax; however, pt poor candidate for TIPS procedure due to h/o hepatic encephalopathy  Will continue their recommendations including home medications of spironolactone, Lasix, lactulose, and Rifaximin, as well as low-salt diet  Calculated MELD-Na score 25 on this admission  Estimated 90-day mortality 14-15%  Continue to monitor CMP and PT/INR in AM  SLIM consulted at this time due to pt's medical complexity, will appreciate recommendations  Will reach out to SLIM for possible transfer to medicine as patient is no longer exhibiting withdrawal symptoms.   Encourage alcohol cessation   Recommend outpatient follow-up GI for ongoing monitoring/maintenance   "

## 2024-04-29 NOTE — UTILIZATION REVIEW
NOTIFICATION OF INPATIENT MEDICAL ADMISSION   AUTHORIZATION REQUEST   SERVICING FACILITY:   [address] [address]  Tax ID: [id number]  NPI: [id number] ATTENDING PROVIDER:  Attending Name and NPI#: Brunilda Felix Md [[id number]]  Address: [address] [place] [address]  Phone: [phone]     ADMISSION INFORMATION:  Place of Service: Inpatient [facility]  Place of Service Code: 21  Inpatient Admission Date/Time: 4/26/24  8:24 PM  Discharge Date/Time: No discharge date for patient encounter.  Admitting Diagnosis Code/Description:  Alcohol intoxication (HCC)     UTILIZATION REVIEW CONTACT:  Apurva Blakely, Utilization   Network Utilization Review Department  Phone: [phone]  Fax [phone]  Email: [email or web address]  Contact for approvals/pending authorizations, clinical reviews, and discharge.     PHYSICIAN ADVISORY SERVICES:  Medical Necessity Denial & Jgsi-yo-[id number] Review  Phone: [phone]  Fax: [phone]  Email: [email or web address]     DISCHARGE SUPPORT TEAM:  For Patients Discharge Needs & Updates  Phone: [phone] opt. 2 Fax: [phone]  Email: [email or web address]

## 2024-04-30 PROBLEM — R26.2 AMBULATORY DYSFUNCTION: Status: RESOLVED | Noted: 2023-07-03 | Resolved: 2024-04-30

## 2024-04-30 LAB
AFP-TM SERPL-MCNC: 5.33 NG/ML (ref 0–9)
ALBUMIN SERPL BCP-MCNC: 1.9 G/DL (ref 3.5–5)
ALP SERPL-CCNC: 67 U/L (ref 34–104)
ALT SERPL W P-5'-P-CCNC: 9 U/L (ref 7–52)
ANION GAP SERPL CALCULATED.3IONS-SCNC: 7 MMOL/L (ref 4–13)
AST SERPL W P-5'-P-CCNC: 29 U/L (ref 13–39)
ATRIAL RATE: 67 BPM
BASOPHILS # BLD AUTO: 0.04 THOUSANDS/ÂΜL (ref 0–0.1)
BASOPHILS NFR BLD AUTO: 1 % (ref 0–1)
BILIRUB SERPL-MCNC: 6.47 MG/DL (ref 0.2–1)
BUN SERPL-MCNC: 9 MG/DL (ref 5–25)
CALCIUM ALBUM COR SERPL-MCNC: 9 MG/DL (ref 8.3–10.1)
CALCIUM SERPL-MCNC: 7.3 MG/DL (ref 8.4–10.2)
CHLORIDE SERPL-SCNC: 106 MMOL/L (ref 96–108)
CO2 SERPL-SCNC: 21 MMOL/L (ref 21–32)
CREAT SERPL-MCNC: 0.77 MG/DL (ref 0.6–1.3)
EOSINOPHIL # BLD AUTO: 0.4 THOUSAND/ÂΜL (ref 0–0.61)
EOSINOPHIL NFR BLD AUTO: 7 % (ref 0–6)
ERYTHROCYTE [DISTWIDTH] IN BLOOD BY AUTOMATED COUNT: 15.7 % (ref 11.6–15.1)
GFR SERPL CREATININE-BSD FRML MDRD: 105 ML/MIN/1.73SQ M
GLUCOSE SERPL-MCNC: 96 MG/DL (ref 65–140)
HBV CORE AB SER QL: NORMAL
HBV CORE IGM SER QL: NORMAL
HBV SURFACE AG SER QL: NORMAL
HCT VFR BLD AUTO: 28.1 % (ref 36.5–49.3)
HCV AB SER QL: NORMAL
HGB BLD-MCNC: 9.2 G/DL (ref 12–17)
IMM GRANULOCYTES # BLD AUTO: 0.01 THOUSAND/UL (ref 0–0.2)
IMM GRANULOCYTES NFR BLD AUTO: 0 % (ref 0–2)
INR PPP: 2.6 (ref 0.84–1.19)
LYMPHOCYTES # BLD AUTO: 1.79 THOUSANDS/ÂΜL (ref 0.6–4.47)
LYMPHOCYTES NFR BLD AUTO: 31 % (ref 14–44)
MAGNESIUM SERPL-MCNC: 1.6 MG/DL (ref 1.9–2.7)
MCH RBC QN AUTO: 33.6 PG (ref 26.8–34.3)
MCHC RBC AUTO-ENTMCNC: 32.7 G/DL (ref 31.4–37.4)
MCV RBC AUTO: 103 FL (ref 82–98)
MONOCYTES # BLD AUTO: 0.71 THOUSAND/ÂΜL (ref 0.17–1.22)
MONOCYTES NFR BLD AUTO: 12 % (ref 4–12)
NEUTROPHILS # BLD AUTO: 2.9 THOUSANDS/ÂΜL (ref 1.85–7.62)
NEUTS SEG NFR BLD AUTO: 49 % (ref 43–75)
NRBC BLD AUTO-RTO: 0 /100 WBCS
P AXIS: 66 DEGREES
PLATELET # BLD AUTO: 78 THOUSANDS/UL (ref 149–390)
PMV BLD AUTO: 10.2 FL (ref 8.9–12.7)
POTASSIUM SERPL-SCNC: 3.7 MMOL/L (ref 3.5–5.3)
PR INTERVAL: 108 MS
PROT SERPL-MCNC: 4.8 G/DL (ref 6.4–8.4)
PROTHROMBIN TIME: 28.2 SECONDS (ref 11.6–14.5)
QRS AXIS: 63 DEGREES
QRSD INTERVAL: 96 MS
QT INTERVAL: 476 MS
QTC INTERVAL: 502 MS
RBC # BLD AUTO: 2.74 MILLION/UL (ref 3.88–5.62)
SODIUM SERPL-SCNC: 134 MMOL/L (ref 135–147)
T WAVE AXIS: 11 DEGREES
VENTRICULAR RATE: 67 BPM
WBC # BLD AUTO: 5.85 THOUSAND/UL (ref 4.31–10.16)

## 2024-04-30 PROCEDURE — 99232 SBSQ HOSP IP/OBS MODERATE 35: CPT

## 2024-04-30 PROCEDURE — 83735 ASSAY OF MAGNESIUM: CPT | Performed by: PHYSICIAN ASSISTANT

## 2024-04-30 PROCEDURE — 93005 ELECTROCARDIOGRAM TRACING: CPT

## 2024-04-30 PROCEDURE — 86705 HEP B CORE ANTIBODY IGM: CPT | Performed by: PHYSICIAN ASSISTANT

## 2024-04-30 PROCEDURE — 80053 COMPREHEN METABOLIC PANEL: CPT | Performed by: PHYSICIAN ASSISTANT

## 2024-04-30 PROCEDURE — 93010 ELECTROCARDIOGRAM REPORT: CPT | Performed by: STUDENT IN AN ORGANIZED HEALTH CARE EDUCATION/TRAINING PROGRAM

## 2024-04-30 PROCEDURE — 82105 ALPHA-FETOPROTEIN SERUM: CPT | Performed by: PHYSICIAN ASSISTANT

## 2024-04-30 PROCEDURE — 86704 HEP B CORE ANTIBODY TOTAL: CPT | Performed by: PHYSICIAN ASSISTANT

## 2024-04-30 PROCEDURE — 86803 HEPATITIS C AB TEST: CPT | Performed by: PHYSICIAN ASSISTANT

## 2024-04-30 PROCEDURE — 87340 HEPATITIS B SURFACE AG IA: CPT | Performed by: PHYSICIAN ASSISTANT

## 2024-04-30 PROCEDURE — 85025 COMPLETE CBC W/AUTO DIFF WBC: CPT | Performed by: PHYSICIAN ASSISTANT

## 2024-04-30 PROCEDURE — 85610 PROTHROMBIN TIME: CPT | Performed by: PHYSICIAN ASSISTANT

## 2024-04-30 RX ORDER — MAGNESIUM SULFATE HEPTAHYDRATE 40 MG/ML
2 INJECTION, SOLUTION INTRAVENOUS ONCE
Status: DISCONTINUED | OUTPATIENT
Start: 2024-04-30 | End: 2024-04-30

## 2024-04-30 RX ORDER — LANOLIN ALCOHOL/MO/W.PET/CERES
100 CREAM (GRAM) TOPICAL DAILY
Status: DISCONTINUED | OUTPATIENT
Start: 2024-04-30 | End: 2024-05-01 | Stop reason: HOSPADM

## 2024-04-30 RX ORDER — LACTULOSE 10 G/15ML
20 SOLUTION ORAL DAILY
Status: DISCONTINUED | OUTPATIENT
Start: 2024-05-01 | End: 2024-05-01 | Stop reason: HOSPADM

## 2024-04-30 RX ORDER — LANOLIN ALCOHOL/MO/W.PET/CERES
400 CREAM (GRAM) TOPICAL 2 TIMES DAILY
Status: DISCONTINUED | OUTPATIENT
Start: 2024-04-30 | End: 2024-05-01 | Stop reason: HOSPADM

## 2024-04-30 RX ADMIN — BACITRACIN 1 LARGE APPLICATION: 500 OINTMENT TOPICAL at 09:15

## 2024-04-30 RX ADMIN — FUROSEMIDE 40 MG: 40 TABLET ORAL at 08:24

## 2024-04-30 RX ADMIN — PANTOPRAZOLE SODIUM 40 MG: 40 TABLET, DELAYED RELEASE ORAL at 05:06

## 2024-04-30 RX ADMIN — CARVEDILOL 6.25 MG: 6.25 TABLET, FILM COATED ORAL at 08:24

## 2024-04-30 RX ADMIN — RIFAXIMIN 550 MG: 550 TABLET ORAL at 08:24

## 2024-04-30 RX ADMIN — THIAMINE HCL TAB 100 MG 100 MG: 100 TAB at 08:33

## 2024-04-30 RX ADMIN — FOLIC ACID 1 MG: 1 TABLET ORAL at 08:24

## 2024-04-30 RX ADMIN — RIFAXIMIN 550 MG: 550 TABLET ORAL at 20:10

## 2024-04-30 RX ADMIN — SPIRONOLACTONE 100 MG: 100 TABLET, FILM COATED ORAL at 08:24

## 2024-04-30 RX ADMIN — LAMOTRIGINE 25 MG: 25 TABLET ORAL at 08:24

## 2024-04-30 RX ADMIN — MULTIPLE VITAMINS W/ MINERALS TAB 1 TABLET: TAB ORAL at 08:24

## 2024-04-30 RX ADMIN — CARVEDILOL 6.25 MG: 6.25 TABLET, FILM COATED ORAL at 20:12

## 2024-04-30 RX ADMIN — LACTULOSE 30 G: 20 SOLUTION ORAL at 08:29

## 2024-04-30 RX ADMIN — Medication 400 MG: at 17:58

## 2024-04-30 RX ADMIN — Medication 400 MG: at 09:14

## 2024-04-30 NOTE — PROGRESS NOTES
CM met with Pt to review discharge goals.  Pt request OP RAO treatment.  OSCAR signed for [facility] and intake appt. Scheduled.  Provider requested that Pulmonlogy and Gastroenterology appt be scheduled

## 2024-04-30 NOTE — ASSESSMENT & PLAN NOTE
"Pt with a h/o alcoholic cirrhosis  CT chest/abd/pelvis 4/29- No significant change with right sided pleural effusion   CT chest/abd/pelvis 4/26 revealed, \"Hepatic cirrhosis and evidence of portal hypertension. Probable cavernous transformation of the main portal vein without evidence of acute thrombosis\"  Recent RUQ U/S 3/21/24 with findings also suggestive of portal HTN  Elevated PT/INR and PTT   Reviewed recent GI consult note from 3/21/24 from previous admission:  \"Child Class C  Life Expectancy :  1-3 years  Abdominal surgery thomas-operative mortality: 82%\"  Pleural effusion likely transudative in origin and due to hepatic hydrothorax; however, pt poor candidate for TIPS procedure due to h/o hepatic encephalopathy  Will continue their recommendations including home medications of spironolactone, Lasix, lactulose, and Rifaximin, as well as low-salt diet  GI consulted:  Educated on alcohol cessation   DF > 32, not a candidate for liver transplant   Continue Lactulose  Continue 2g sodium diet   Outpatient Follow up -CM to assist with follow up appointments   GI signed off on 4/30/24   Encourage alcohol cessation   Recommend outpatient follow-up GI for ongoing monitoring/maintenance   "

## 2024-04-30 NOTE — PROGRESS NOTES
"  Progress Note - Medical Toxicology    Curt Dominguez 53 y.o. male MRN: [id number]  Unit/Bed#:  DETOX 504-01 Encounter: [id number]  MEDICAL DETOX UNIT, LEVEL 4  Department of Medical Toxicology  Reason for Admission/Principal Problem: alcohol withdrawal   Rounding Provider: Denisse Vásquez PA-C, Brunilda Felix MD         Alcoholic cirrhosis (HCC)  Assessment & Plan  Pt with a h/o alcoholic cirrhosis  CT chest/abd/pelvis 4/29- No significant change with right sided pleural effusion   CT chest/abd/pelvis 4/26 revealed, \"Hepatic cirrhosis and evidence of portal hypertension. Probable cavernous transformation of the main portal vein without evidence of acute thrombosis\"  Recent RUQ U/S 3/21/24 with findings also suggestive of portal HTN  Elevated PT/INR and PTT   Reviewed recent GI consult note from 3/21/24 from previous admission:  \"Child Class C  Life Expectancy :  1-3 years  Abdominal surgery thomas-operative mortality: 82%\"  Pleural effusion likely transudative in origin and due to hepatic hydrothorax; however, pt poor candidate for TIPS procedure due to h/o hepatic encephalopathy  Will continue their recommendations including home medications of spironolactone, Lasix, lactulose, and Rifaximin, as well as low-salt diet  GI consulted:  Educated on alcohol cessation   DF > 32, not a candidate for liver transplant   Continue Lactulose  Continue 2g sodium diet   Outpatient Follow up -CM to assist with follow up appointments   GI signed off on 4/30/24   Encourage alcohol cessation   Recommend outpatient follow-up GI for ongoing monitoring/maintenance     Prolonged Q-T interval on ECG  Assessment & Plan  EKG in the ED revealed QTc 491 ms --> 515  Repeat EKG in the AM  Avoid QT prolonging agents      Alcohol use disorder, severe, dependence (HCC)  Assessment & Plan  Pt with a h/o chronic heavy alcohol use with intermittent period of sobriety over the years  Currently drinks 3 large cans (24 oz) of beer " daily  Denies H/o withdrawal seizures  Interested in Vivitrol injections at this time as long as liver function is acceptable  Withdrawal management as above  Discontinue IVFs  Completed high dose thiamine regimen  Continue thiamine, folic acid, and MVI  Consult case management/CRS for assistance with aftercare resources - pt interested in IP rehabilitation at this time     GERD (gastroesophageal reflux disease)  Assessment & Plan  Continue daily PPI     Hypertension  Assessment & Plan  Patient with a history of HTN  Home medication regimen: Coreg BID, spironolactone and Lasix daily  Most Recent Blood Pressure: 115/64    Will continue home medications given multiple medical comorbidities  Continue monitoring BP     Elevated INR  Assessment & Plan  Stable in the setting of liver cirrhosis   No need for further monitoring     SERGEI (obstructive sleep apnea)  Assessment & Plan  Patient is not currently using CPAP at home  Will monitor on continuous pulse oximetry at this time  Recommend f/u for sleep study outpatient     Bipolar depression (HCC)  Assessment & Plan  Patient with a h/o bipolar disorder  Home medications include Prozac and Lamictal daily  Denies SI/HI/thoughts of self harm  Continue home medications  Recommend OP f/u with PCP/OP Psychiatry     Thrombocytopenia (HCC)  Assessment & Plan  PLT 94 on admission --> 77  In the setting of chronic alcohol use  Daily thiamine/folic acid supplementation  Will hold Lovenox given PTL <100; SCDs currently in place  Encourage alcohol cessation     Pleural effusion, right  Assessment & Plan  Pt with a h/o recent R pleural effusion due to hepatic hydrothorax.   Pulmonology was consulted on 4/29/24   As patient is currently asymptomatic defer thoracentesis   Continue diuretic regimen of Lasix 40 mg, Spirolactone 100 mg   2 g sodium diet   Patient will require outpatient pulmonology follow up.   Pulmonology signed off on 4/30/24     Ambulatory dysfunction-resolved as of  4/30/2024  Assessment & Plan  Pt with a h/o ambulatory dysfunction and frequent falls at home  No ataxia on examination   Completed high dose thiamine Q8H x9 doses  PT eval- home with no needs   Fall precautions in place            VTE Pharmacologic Prophylaxis:   Pharmacologic: Pharmacologic VTE Prophylaxis contraindicated due to thrombocytopenia   Mechanical VTE Prophylaxis in Place: no    Code Status: Level 1 - Full Code    Patient Centered Rounds: I have performed bedside rounds with nursing staff today.    Discussions with Specialists or Other Care Team Provider: Case Management, GI, Attending Dr. Roper   Education and Discussions with Family / Patient: I personally answered all of the patient's questions to the best of my ability     Time Spent for Care: 30 minutes.  More than 50% of total time spent on counseling and coordination of care as described above.    Current Length of Stay: 4 day(s)    Current Patient Status: Inpatient     Certification Statement: The patient will continue to require additional inpatient hospital stay due to pending inpatient drug and alcohol rehab   Discharge Plan: Anticipated Discharge once placement is obtained       Subjective:   Patient seen and examined at bedside. Denies withdrawal symptoms. He denies any chest pain, shortness of breath, or abdominal pain. Patient has more of an appetite today. He is ambulating without any difficulty.     Objective:     Clinical Opiate Withdrawal Scale  Pulse: 63    SEWS Total Score: 0 (4/28/2024  8:00 AM)        Last 24 Hours Medication List:   Current Facility-Administered Medications   Medication Dose Route Frequency Provider Last Rate    acetaminophen  650 mg Oral Q6H PRN Bela Shields PA-C      aluminum-magnesium hydroxide-simethicone  30 mL Oral Q6H PRN Bela Shields PA-C      bacitracin  1 large application Topical Daily Bela Shields PA-C      carvedilol  6.25 mg Oral BID With Meals Bela Mixon  CHIARA Shields      folic acid  1 mg Oral Daily Bela Shields PA-C      furosemide  40 mg Oral Daily Bela Shields PA-C      [START ON 2024] lactulose  20 g Oral Daily Leta Felder PA-C      lamoTRIgine  25 mg Oral Daily Bela Shields PA-C      magnesium Oxide  400 mg Oral BID Denisse Vásquez PA-C      melatonin  6 mg Oral HS PRN Bela Shields PA-C      multivitamin-minerals  1 tablet Oral Daily Bela Shields PA-C      ondansetron  4 mg Oral Q6H PRN Bela Shields PA-C      pantoprazole  40 mg Oral Early Morning Bela Shields PA-C      rifaximin  550 mg Oral Q12H ADRIAN Leta Felder PA-C      spironolactone  100 mg Oral Daily Bela Shields PA-C      thiamine  100 mg Oral Daily Denisse Vásquez PA-C           Vitals:   Temp (24hrs), Av.8 °F (36.6 °C), Min:97.2 °F (36.2 °C), Max:98.5 °F (36.9 °C)    Temp:  [97.2 °F (36.2 °C)-98.5 °F (36.9 °C)] 97.7 °F (36.5 °C)  HR:  [63-69] 63  Resp:  [16-18] 16  BP: ()/(52-69) 115/64  SpO2:  [93 %-97 %] 97 %  Body mass index is 31.43 kg/m².     Input and Output Summary (last 24 hours):  Intake/Output Summary (Last 24 hours) at 2024 0923  Last data filed at 2024 0501  Gross per 24 hour   Intake 1060 ml   Output 250 ml   Net 810 ml       Physical Exam:   Physical Exam  Vitals and nursing note reviewed.   Constitutional:       General: He is not in acute distress.     Appearance: He is not diaphoretic.   Cardiovascular:      Rate and Rhythm: Normal rate and regular rhythm.      Heart sounds: No murmur heard.  Pulmonary:      Effort: No respiratory distress.      Breath sounds: Normal breath sounds.   Abdominal:      General: There is no distension.      Palpations: Abdomen is soft.   Skin:     Coloration: Skin is jaundiced.   Neurological:      Mental Status: He is alert and oriented to person, place, and time.   Psychiatric:         Mood and Affect: Mood is not  anxious or depressed.         Additional Data:     Labs: keep all most recent labs as listed on admission templates   Results from last 7 days   Lab Units 04/30/24  0528   WBC Thousand/uL 5.85   HEMOGLOBIN g/dL 9.2*   HEMATOCRIT % 28.1*   PLATELETS Thousands/uL 78*   SEGS PCT % 49   LYMPHO PCT % 31   MONO PCT % 12   EOS PCT % 7*      Results from last 7 days   Lab Units 04/30/24  0528   SODIUM mmol/L 134*   POTASSIUM mmol/L 3.7   CHLORIDE mmol/L 106   CO2 mmol/L 21   BUN mg/dL 9   CREATININE mg/dL 0.77   ANION GAP mmol/L 7   CALCIUM mg/dL 7.3*   ALBUMIN g/dL 1.9*   TOTAL BILIRUBIN mg/dL 6.47*   ALK PHOS U/L 67   ALT U/L 9   AST U/L 29   GLUCOSE RANDOM mg/dL 96      Results from last 7 days   Lab Units 04/30/24  0702   INR  2.60*                Results from last 7 days   Lab Units 04/28/24  0557 04/27/24  0750 04/27/24  0450 04/26/24  2355   LACTIC ACID mmol/L 0.8 2.2* 2.4* 2.6*          * I Have Reviewed All Lab Data Listed Above.  * Additional Pertinent Lab Tests Reviewed: All Labs For Current Hospital Admission Reviewed      Imaging Studies: I have personally reviewed pertinent reports.        Recent Cultures (last 7 days):          Today, Patient Was Seen By: Denisse Vásquez PA-C    ** Please Note: Dictation voice to text software may have been used in the creation of this document. **

## 2024-04-30 NOTE — ASSESSMENT & PLAN NOTE
Pt with a h/o chronic heavy alcohol use with intermittent period of sobriety over the years  Currently drinks 3 large cans (24 oz) of beer daily  Denies H/o withdrawal seizures  Interested in Vivitrol injections at this time as long as liver function is acceptable  Withdrawal management as above  Discontinue IVFs  Completed high dose thiamine regimen  Continue thiamine, folic acid, and MVI  Consult case management/CRS for assistance with aftercare resources - pt interested in IP rehabilitation at this time

## 2024-04-30 NOTE — PROGRESS NOTES
"Patient Name: Curt Dominguez  Patient MRN: [id number]  Date: 04/30/24  Service: [facility]    Subjective   Labs, notes reviewed. Vitals stable. Coreg held yesterday due to low SBP. No events noted. No gi bleeding. Several BMs reported by patient yesterday, 4-5 per RN. Eating better today. Denies SOB, abdominal pain, nausea, vomiting. No confusion.     Vitals  Blood pressure 115/64, pulse 63, temperature 97.7 °F (36.5 °C), temperature source Temporal, resp. rate 16, height 5' 9\" (1.753 m), weight 96.5 kg (212 lb 12.8 oz), SpO2 97%.  Physical Exam:     General Appearance:    Awake, alert, oriented x3, no distress, appears brighter this morning. Eating bfast without difficulty.    Head:    Normocephalic without obvious abnormality   Eyes:    +icterus, PERRL, EOM's intact        Neck:   Supple, no adenopathy   Throat:   Mucous membranes moist. Poor dentition   Lungs:     Clear to auscultation bilaterally, no wheezing or rhonchi   Heart:    Regular rate and rhythm, S1 and S2 normal, no murmur   Abdomen:     Soft, non-tender, non-distended. +umb hernia. bowel sounds active. No masses, rebound or guarding.    Extremities:   Extremities normal. No clubbing, cyanosis or edema   Psych  Derm:   Normal affect    + jaundice +palmar erythema   Neurologic:   CNII-XII grossly intact. Speech intact. No asterixis         Laboratory Studies  Results from last 7 days   Lab Units 04/30/24  0528 04/29/24  0435 04/28/24  0557 04/27/24  0523 04/27/24  0450 04/26/24  1301   WBC Thousand/uL 5.85 6.28 5.13  --  5.47 6.01   HEMOGLOBIN g/dL 9.2* 9.3* 9.5*  --  9.6* 10.9*   HEMATOCRIT % 28.1* 28.0* 28.0*  --  27.7* 32.2*   PLATELETS Thousands/uL 78* 80* 83*  --  77* 94*   INR   --  2.43*  --  2.31*  --  2.15*     Results from last 7 days   Lab Units 04/30/24  0528 04/29/24  0435 04/28/24  0557 04/27/24  0450 04/26/24  2105   SODIUM mmol/L 134* 135 135 140 140   POTASSIUM mmol/L 3.7 3.6 3.7 3.5 3.5   CHLORIDE mmol/L 106 106 " 107 106 107   CO2 mmol/L 21 25 24 25 23   BUN mg/dL 9 9 7 5 5   CREATININE mg/dL 0.77 0.78 0.74 0.69 0.71   CALCIUM mg/dL 7.3* 7.2* 7.2* 7.3* 7.4*   ALBUMIN g/dL 1.9* 1.9* 1.8* 2.0* 2.2*   TOTAL BILIRUBIN mg/dL 6.47* 7.72* 8.21* 7.13* 7.01*   ALK PHOS U/L 67 67 70 75 81   ALT U/L 9 11 10 14 14   AST U/L 29 27 31 40* 38         Imaging and Other Studies  CT C/A/P without contrast 4/29:   1.  Unchanged large right pleural effusion with partial atelectasis of the right lower lobe. No new findings in the chest.  2.  Cholelithiasis. Gallbladder wall thickening is nonspecific in the setting of liver disease and volume overload, but acute cholecystitis cannot be excluded in the appropriate clinical setting. This may be evaluated with right upper quadrant ultrasound   if indicated.  3.  Mild peripancreatic stranding may be related to venous congestion in setting of portal hypertension but should be correlated clinically for evidence of acute pancreatitis.  4.  Cirrhosis with portal hypertension.  5.  Mild bladder wall thickening versus prominence from under distention. Correlate for evidence of cystitis.  6.  Stable small posterior subcutaneous hematoma.  Inhouse Medications     Current Facility-Administered Medications:     acetaminophen (TYLENOL) tablet 650 mg, 650 mg, Oral, Q6H PRN    aluminum-magnesium hydroxide-simethicone (MAALOX) oral suspension 30 mL, 30 mL, Oral, Q6H PRN    bacitracin topical ointment 1 large application, 1 large application, Topical, Daily, 1 large application at 04/29/24 0814    carvedilol (COREG) tablet 6.25 mg, 6.25 mg, Oral, BID With Meals, 6.25 mg at 04/30/24 0824    folic acid (FOLVITE) tablet 1 mg, 1 mg, Oral, Daily, 1 mg at 04/30/24 0824    furosemide (LASIX) tablet 40 mg, 40 mg, Oral, Daily, 40 mg at 04/30/24 0824    lactulose (CHRONULAC) oral solution 30 g, 30 g, Oral, Daily, 30 g at 04/30/24 0829    lamoTRIgine (LaMICtal) tablet 25 mg, 25 mg, Oral, Daily, 25 mg at 04/30/24 0824     magnesium Oxide (MAG-OX) tablet 400 mg, 400 mg, Oral, BID    melatonin tablet 6 mg, 6 mg, Oral, HS PRN, 6 mg at 04/28/24 2225    multivitamin-minerals (CENTRUM) tablet 1 tablet, 1 tablet, Oral, Daily, 1 tablet at 04/30/24 0824    ondansetron (ZOFRAN-ODT) dispersible tablet 4 mg, 4 mg, Oral, Q6H PRN    pantoprazole (PROTONIX) EC tablet 40 mg, 40 mg, Oral, Early Morning, 40 mg at 04/30/24 0506    rifaximin (XIFAXAN) tablet 550 mg, 550 mg, Oral, Q12H ADRIAN, 550 mg at 04/30/24 0824    spironolactone (ALDACTONE) tablet 100 mg, 100 mg, Oral, Daily, 100 mg at 04/30/24 0824    thiamine tablet 100 mg, 100 mg, Oral, Daily, 100 mg at 04/30/24 0833      Assessment/Plan:    Decompensated alcoholic cirrhosis complicated by ascites, hepatic hydrothorax, hepatic encephalopathy, pHTN, hx SBP, thrombocytopenia, anemia, coagulopathy. No history of variceal bleeding. Admitted with alcohol intoxication with alcoholic hepatitis (DF improved from last month). Recurrent R-sided pleural effusion 2/2 hepatic hydrothorax - appreciate Pulm input.    MDF = 65 (this adm) >75 >  pending today  MELD-Na =23 (this adm)> 25 > pending today  MELD = 23 (this adm) > 24  > pending today    Despite elevated DF >32, given improvement over the past month and medication noncompliance, at this time favor holding off steroids. Continue to monitor. Absolute alcohol abstinence.   Liver transplant evaluation status - not likely transplant candidate given poor compliance and poor social support  Volume: small volume ascites on 4/29 CT imaging, large R pleural effusion again noted. Monitor daily weight. Currently on Lasix 40mg daily, spironolactone 100mg daily, and 2g Na diet. Gynecomastia may limit spironolactone use. Avoid chest tube placement. Poor TIPS candidate with history of HE.   Hepatic encephalopathy: currently controlled. Adm ammonia WNL. Continue lactulose titrated to produce 2-3 soft stools daily and Xifaxan BID. Monitor clinically and stool output.     Esophageal varices: Last EGD 11/2023 without varices or PHG. Repeat 2025, sooner if active gi bleeding.   Labs: Anemia low/stable, sodium WNL, liver enzymes WNL (except bilirubin primarily indirect)   Hepatoma screening: Imaging UTD, AFP pending.   Check viral hepatitis status. Further serologies as outpatient after recovery.   Electrolyte correction per primary.   Would benefit from PT/OT. Continue high protein diet, consider ensure supplemention (pt defers, does not like flavors).   Colon Cancer screening: unknown.           Leta Felder PA-C

## 2024-04-30 NOTE — ASSESSMENT & PLAN NOTE
PLT 94 on admission --> 77  In the setting of chronic alcohol use  Daily thiamine/folic acid supplementation  Will hold Lovenox given PTL <100; SCDs currently in place  Encourage alcohol cessation

## 2024-04-30 NOTE — PLAN OF CARE
Problem: SUBSTANCE USE/ABUSE  Goal: By discharge, will develop insight into their chemical dependency and sustain motivation to continue in recovery  Description: INTERVENTIONS:  - Attends all daily group sessions and scheduled AA groups  - Actively practices coping skills through participation in the therapeutic community and adherence to program rules  - Reviews and completes assignments from individual treatment plan  - Assist patient development of understanding of their personal cycle of addiction and relapse triggers  Outcome: Progressing  Goal: By discharge, patient will have ongoing treatment plan addressing chemical dependency  Description: INTERVENTIONS:  - Assist patient with resources and/or appointments for ongoing recovery based living  Outcome: Progressing     Problem: INFECTION - ADULT  Goal: Absence or prevention of progression during hospitalization  Description: INTERVENTIONS:  - Assess and monitor for signs and symptoms of infection  - Monitor lab/diagnostic results  - Monitor all insertion sites, i.e. indwelling lines, tubes, and drains  - Monitor endotracheal if appropriate and nasal secretions for changes in amount and color  - [place] appropriate cooling/warming therapies per order  - Administer medications as ordered  - Instruct and encourage patient and family to use good hand hygiene technique  - Identify and instruct in appropriate isolation precautions for identified infection/condition  Outcome: Progressing  Goal: Absence of fever/infection during neutropenic period  Description: INTERVENTIONS:  - Monitor WBC    Outcome: Progressing     Problem: SAFETY ADULT  Goal: Patient will remain free of falls  Description: INTERVENTIONS:  - Educate patient/family on patient safety including physical limitations  - Instruct patient to call for assistance with activity   - Consult OT/PT to assist with strengthening/mobility   - Keep Call bell within reach  - Keep bed low and locked with side  rails adjusted as appropriate  - Keep care items and personal belongings within reach  - Initiate and maintain comfort rounds  - Make Fall Risk Sign visible to staff  - Apply yellow socks and bracelet for high fall risk patients  - Consider moving patient to room near nurses station  Outcome: Progressing  Goal: Maintain or return to baseline ADL function  Description: INTERVENTIONS:  -  Assess patient's ability to carry out ADLs; assess patient's baseline for ADL function and identify physical deficits which impact ability to perform ADLs (bathing, care of mouth/teeth, toileting, grooming, dressing, etc.)  - Assess/evaluate cause of self-care deficits   - Assess range of motion  - Assess patient's mobility; develop plan if impaired  - Assess patient's need for assistive devices and provide as appropriate  - Encourage maximum independence but intervene and supervise when necessary  - Involve family in performance of ADLs  - Assess for home care needs following discharge   - Consider OT consult to assist with ADL evaluation and planning for discharge  - Provide patient education as appropriate  Outcome: Progressing  Goal: Maintains/Returns to pre admission functional level  Description: INTERVENTIONS:  - Perform AM-PAC 6 Click Basic Mobility/ Daily Activity assessment daily.  - Set and communicate daily mobility goal to care team and patient/family/caregiver.   - Collaborate with rehabilitation services on mobility goals if consulted  - Out of bed for toileting  - Record patient progress and toleration of activity level   Outcome: Progressing

## 2024-04-30 NOTE — DISCHARGE SUMMARY
[facility]  Discharge- Curt Dominguez 1970, 53 y.o. male MRN: [id number]  Unit/Bed#: 5T DETOX 504-01 Encounter: [id number]  Primary Care Provider: Leonela Oseguera MD   Date and time admitted to hospital: 4/26/2024  8:24 PM    MEDICAL DETOX UNIT, LEVEL 4  Department of Medical Toxicology  Reason for Admission/Principal Problem: Alcohol Withdrawal, Decompensated Liver Cirrhosis   Admitting provider: MD Martha Fernandez*   4/26/2024  8:24 PM       Discharging Physician / Practitioner: Martha Gibbons MD  PCP: Leonela Oseguera MD  Admission Date:   Admission Orders (From admission, onward)       Ordered        04/26/24 2035  Inpatient Admission  Once                          Discharge Date: 05/01/24    Medical Problems       Resolved Problems  Date Reviewed: 4/26/2024            Resolved    Ambulatory dysfunction 4/30/2024     Resolved by  Denisse Vásquez PA-C    Lactic acidosis 4/29/2024     Resolved by  Denisse Vásquez PA-C    Hypokalemia 4/29/2024     Resolved by  Denisse Vásquez PA-C    Alcohol withdrawal syndrome with complication (HCC) 4/29/2024     Resolved by  Martha Gibbons MD    Hypomagnesemia 4/29/2024     Resolved by  Denisse Vásquez PA-C          No new Assessment & Plan notes have been filed under this hospital service since the last note was generated.  Service: Medical Toxicology      Consultations During Hospital Stay:  Pulmonology   GI     Procedures Performed:   None     Significant Findings / Test Results:   CT C/A/P: 4/29/24  1.  Unchanged large right pleural effusion with partial atelectasis of the right lower lobe. No new findings in the chest. 2.  Cholelithiasis. Gallbladder wall thickening is nonspecific in the setting of liver disease and volume overload, but acute cholecystitis cannot be excluded in the appropriate clinical setting. This may be evaluated with right  upper quadrant ultrasound if indicated. 3.  Mild peripancreatic stranding may be related to venous congestion in setting of portal hypertension but should be correlated clinically for evidence of acute pancreatitis. 4.  Cirrhosis with portal hypertension 5.  Mild bladder wall thickening versus prominence from under distention. Correlate for evidence of cystitis.6.  Stable small posterior subcutaneous hematoma.      Incidental Findings:   none     Test Results Pending at Discharge (will require follow up):   None     Outpatient Tests/ Follow Up Requested:  [facility] [place] [facility] 5/7/24   Outpatient follow up with GI/hepatology: 5/8/2024  PCP 5/9/2024  Weekly labs    Complications:  none     Reason for Admission: alcohol withdrawal     Hospital Course:     Curt Dominguez is a 53 y.o. male patient who originally presented to the hospital on 4/26/2024 due to alcohol withdrawal. Patient has a past medical history significant for decompensated alcoholic cirrhosis, HTN, GERD, recurrent right pleural effusion, bipolar disorder, hepatic encephalopathy, and SERGEI. Patient initially presented to the [facility] ED requesting detoxification from alcohol. Patient was admitted to the [facility] medical detox unit under [facility] protocol for medically supervised alcohol withdrawal management, receiving a total of 844 mg phenobarbital without complication; last dose of phenobarbital administered 4/27/24. GI and Pulmonology were both consulted for decompensated cirrhosis and recurrent R pleural effusion, respectively; see above for further details. Patient is unfortunately not a liver transplant or TIPS candidate. It was recommended to continue on current diuretic regimen with outpatient follow up with dietary optimization. Case management was consulted and outpatient follow up was arranged as patient requested outpatient discharge instead of pursuing inpatient drug and alcohol rehab.    Please see above list of diagnoses and related plan for additional  "information.     Condition at Discharge: stable     Discharge Day Visit / Exam:     Subjective:  patient feels well today. Denies acute complaints. Tolerating PO intake. Looking forward to discharge home. Discussed acamprosate    Vitals: Blood Pressure: 112/67 (05/01/24 0700)  Pulse: 68 (05/01/24 0700)  Temperature: 99.2 °F (37.3 °C) (05/01/24 0700)  Temp Source: Temporal (05/01/24 0700)  Respirations: 18 (05/01/24 0700)  Height: 5' 9\" (175.3 cm) (04/29/24 1022)  Weight - Scale: 95.2 kg (209 lb 12.8 oz) (05/01/24 0600)  SpO2: 99 % (04/30/24 2000)    Exam:   Physical Exam  Vitals and nursing note reviewed.   Constitutional:       General: He is not in acute distress.     Appearance: He is ill-appearing (chronic). He is not toxic-appearing or diaphoretic.   HENT:      Head: Normocephalic and atraumatic.      Nose: Nose normal.      Mouth/Throat:      Mouth: Mucous membranes are moist.   Eyes:      General: Scleral icterus present.         Right eye: No discharge.         Left eye: No discharge.      Conjunctiva/sclera: Conjunctivae normal.   Cardiovascular:      Rate and Rhythm: Normal rate and regular rhythm.   Pulmonary:      Effort: Pulmonary effort is normal. No respiratory distress.      Breath sounds: No wheezing, rhonchi or rales.   Abdominal:      General: There is distension.      Palpations: Abdomen is soft.      Tenderness: There is no abdominal tenderness. There is no guarding or rebound.      Comments: Hepatomegaly present   Musculoskeletal:         General: No deformity.      Cervical back: Neck supple.   Skin:     General: Skin is warm and dry.      Coloration: Skin is jaundiced.   Neurological:      General: No focal deficit present.      Mental Status: He is alert and oriented to person, place, and time.      Comments: No tongue fasciculations or intention tremor   Psychiatric:         Mood and Affect: Mood normal.         Behavior: Behavior normal.       Discussion with Family: none    Discharge " instructions/Information to patient and family:   See after visit summary for information provided to patient and family.      Provisions for Follow-Up Care:  See after visit summary for information related to follow-up care and any pertinent home health orders.      Disposition:     Home    For Discharges to [facility]:   Not Applicable to this Patient - Not Applicable to this Patient    Planned Readmission: None     Discharge Statement:  I spent 40 minutes discharging the patient. This time was spent on the day of discharge. I had direct contact with the patient on the day of discharge. Greater than 50% of the total time was spent examining patient, answering all patient questions, arranging and discussing plan of care with patient as well as directly providing post-discharge instructions.  Additional time then spent on discharge activities.    Discharge Medications:  See after visit summary for reconciled discharge medications provided to patient and family.      ** Please Note: This note has been constructed using a voice recognition system **

## 2024-04-30 NOTE — ASSESSMENT & PLAN NOTE
Pt with a h/o recent R pleural effusion due to hepatic hydrothorax.   Pulmonology was consulted on 4/29/24   As patient is currently asymptomatic defer thoracentesis   Continue diuretic regimen of Lasix 40 mg, Spirolactone 100 mg   2 g sodium diet   Patient will require outpatient pulmonology follow up.   Pulmonology signed off on 4/30/24

## 2024-04-30 NOTE — ASSESSMENT & PLAN NOTE
Patient with a history of HTN  Home medication regimen: Coreg BID, spironolactone and Lasix daily  Most Recent Blood Pressure: 115/64    Will continue home medications given multiple medical comorbidities  Continue monitoring BP

## 2024-04-30 NOTE — UTILIZATION REVIEW
Continued Stay Review    Date: 4/30/2024                         Current Patient Class:  Inpatient   Current Level of Care:  Level 4 medical detox    HPI:53 y.o. male initially admitted on 6/26/2024 medical detox - alcohol withdrawal syndrome.      Assessment/Plan: 4/30/2024- educated on alcohol cessation - DF>32, not a liver transplant candidate. Continue diuretic lasix 40 mg. Spirolactone . Plan to inpatient drug and alcohol rehab, await placement.   Gastroenterology Note-4/30 - Decompensated alcoholic cirrhosis complicated by ascites. Hepatic hydrothorax, hepatic encephalopathy, P HTN, SBP, thrombocytopenia, anemia, coagulopathy. Coreg held on 4/29 due to low BP. Several Bms reported by patient. 4-5 per RN. T bili slowly improving. No acute changes noted. Agree with diuretics and low NA diet to manage hepatic hydrothorax     Vital Signs:   Date/Time Temp Pulse Resp BP MAP (mmHg) SpO2 O2 Device Patient Position - Orthostatic VS   04/30/24 1155 97.7 °F (36.5 °C) 64 18 114/59 -- 97 % None (Room air) Lying   04/30/24 0745 97.7 °F (36.5 °C) 63 16 115/64 -- 97 % None (Room air) Lying   04/30/24 0532 97.2 °F (36.2 °C) Abnormal  63 16 120/59 -- 96 % None (Room air) Lying   04/29/24 1929 98.1 °F (36.7 °C) 65 16 116/65 -- 97 % None (Room air) Lying   04/29/24 1544 97.6 °F (36.4 °C) 66 18 117/63 -- 95 % None (Room air) Lying   04/29/24 1204 98.5 °F (36.9 °C) 65 18 119/69 -- 94 % None (Room air) Lying       Pertinent Labs/Diagnostic Results:   Results from last 7 days   Lab Units 04/26/24  1301   SARS-COV-2  Negative     Results from last 7 days   Lab Units 04/30/24  0528 04/29/24  0435 04/28/24  0557 04/27/24  0450 04/26/24  1301   WBC Thousand/uL 5.85 6.28 5.13 5.47 6.01   HEMOGLOBIN g/dL 9.2* 9.3* 9.5* 9.6* 10.9*   HEMATOCRIT % 28.1* 28.0* 28.0* 27.7* 32.2*   PLATELETS Thousands/uL 78* 80* 83* 77* 94*   TOTAL NEUT ABS Thousands/µL 2.90  --   --   --  2.51         Results from last 7 days   Lab Units 04/30/24  0528  04/29/24  0435 04/28/24  0557 04/27/24  0450 04/26/24  2105   SODIUM mmol/L 134* 135 135 140 140   POTASSIUM mmol/L 3.7 3.6 3.7 3.5 3.5   CHLORIDE mmol/L 106 106 107 106 107   CO2 mmol/L 21 25 24 25 23   ANION GAP mmol/L 7 4 4 9 10   BUN mg/dL 9 9 7 5 5   CREATININE mg/dL 0.77 0.78 0.74 0.69 0.71   EGFR ml/min/1.73sq m 105 104 106 109 108   CALCIUM mg/dL 7.3* 7.2* 7.2* 7.3* 7.4*   MAGNESIUM mg/dL 1.6* 1.9 1.8* 2.3 1.5*     Results from last 7 days   Lab Units 04/30/24  0528 04/29/24  0435 04/28/24  0557 04/27/24  0450 04/26/24 2105 04/26/24  1301   AST U/L 29 27 31 40* 38 42*   ALT U/L 9 11 10 14 14 17   ALK PHOS U/L 67 67 70 75 81 87   TOTAL PROTEIN g/dL 4.8* 4.6* 4.7* 4.9* 5.4* 5.9*   ALBUMIN g/dL 1.9* 1.9* 1.8* 2.0* 2.2* 2.5*   TOTAL BILIRUBIN mg/dL 6.47* 7.72* 8.21* 7.13* 7.01* 7.73*   BILIRUBIN DIRECT mg/dL  --  3.13* 3.24*  --  2.52*  --    AMMONIA umol/L  --   --   --   --   --  57         Results from last 7 days   Lab Units 04/30/24  0528 04/29/24  0435 04/28/24  0557 04/27/24  0450 04/26/24 2105 04/26/24  1301   GLUCOSE RANDOM mg/dL 96 87 106 86 103 98       Results from last 7 days   Lab Units 04/27/24  0450   CK TOTAL U/L 75     Results from last 7 days   Lab Units 04/26/24  1442 04/26/24  1301   HS TNI 0HR ng/L  --  6   HS TNI 2HR ng/L 6  --    HSTNI D2 ng/L 0  --          Results from last 7 days   Lab Units 04/30/24  0702 04/29/24  0435 04/27/24  0523 04/26/24  1301   PROTIME seconds 28.2* 26.7* 25.7* 24.4*   INR  2.60* 2.43* 2.31* 2.15*   PTT seconds  --   --   --  43*             Results from last 7 days   Lab Units 04/28/24  0557 04/27/24  0750 04/27/24  0450 04/26/24  2355 04/26/24  2105 04/26/24  1550 04/26/24  1301   LACTIC ACID mmol/L 0.8 2.2* 2.4* 2.6* 2.1* 2.3* 2.2*             Results from last 7 days   Lab Units 04/26/24  1301   BNP pg/mL 105*     Results from last 7 days   Lab Units 04/27/24  0450   FERRITIN ng/mL 172   IRON ug/dL 134       Results from last 7 days   Lab Units  04/26/24  1301   LIPASE u/L 47       Results from last 7 days   Lab Units 04/26/24  1449   CLARITY UA  Clear   COLOR UA  Barbara   SPEC GRAV UA  <=1.005   PH UA  6.5   GLUCOSE UA mg/dl Negative   KETONES UA mg/dl Negative   BLOOD UA  Moderate*   PROTEIN UA mg/dl Negative   NITRITE UA  Negative   BILIRUBIN UA  Small*   UROBILINOGEN UA E.U./dl 4.0*   LEUKOCYTES UA  Negative   WBC UA /hpf None Seen   RBC UA /hpf 1-2   BACTERIA UA /hpf None Seen   EPITHELIAL CELLS WET PREP /hpf None Seen     Results from last 7 days   Lab Units 04/26/24  1301   INFLUENZA A PCR  Negative   INFLUENZA B PCR  Negative   RSV PCR  Negative         Results from last 7 days   Lab Units 04/26/24  1449   AMPH/METH  Negative   BARBITURATE UR  Negative   BENZODIAZEPINE UR  Negative   COCAINE UR  Negative   METHADONE URINE  Negative   OPIATE UR  Negative   PCP UR  Negative   THC UR  Negative     Results from last 7 days   Lab Units 04/26/24  1301   ETHANOL LVL mg/dL 362*         Medications:   Scheduled Medications:  bacitracin, 1 large application, Topical, Daily  carvedilol, 6.25 mg, Oral, BID With Meals  folic acid, 1 mg, Oral, Daily  furosemide, 40 mg, Oral, Daily  Lactulose 30g po qd - last dose 4/30  [START ON 5/1/2024] lactulose, 20 g, Oral, Daily  lamoTRIgine, 25 mg, Oral, Daily  magnesium Oxide, 400 mg, Oral, BID  multivitamin-minerals, 1 tablet, Oral, Daily  pantoprazole, 40 mg, Oral, Early Morning  rifaximin, 550 mg, Oral, Q12H ADRIAN  spironolactone, 100 mg, Oral, Daily  thiamine, 100 mg, Oral, Daily      Continuous IV Infusions:   sodium chloride 0.9 % infusion  Rate: 75 mL/hr Dose: 75 mL/hr  Freq: Continuous Route: IV  Indications of Use: IV Hydration  Last Dose: Stopped (04/29/24 0942)  Start: 04/26/24 2045 End: 04/29/24 0939     PRN Meds:  acetaminophen, 650 mg, Oral, Q6H PRN  aluminum-magnesium hydroxide-simethicone, 30 mL, Oral, Q6H PRN  melatonin, 6 mg, Oral, HS PRN  ondansetron, 4 mg, Oral, Q6H PRN        Discharge Plan:  TBD    Network Utilization Review Department  ATTENTION: Please call with any questions or concerns to [phone] and carefully listen to the prompts so that you are directed to the right person. All voicemails are confidential.   For Discharge needs, contact Care Management DC Support Team at [phone] opt. 2  Send all requests for admission clinical reviews, approved or denied determinations and any other requests to dedicated fax number below belonging to the campus where the patient is receiving treatment. List of dedicated fax numbers for the Facilities:  FACILITY NAME UR FAX NUMBER   ADMISSION DENIALS (Administrative/Medical Necessity) [phone]   DISCHARGE SUPPORT TEAM (NETWORK) [phone]   PARENT CHILD HEALTH (Maternity/NICU/Pediatrics) [phone]   [facility] [phone]   [facility] [phone]   [facility] [phone]   [facility] [phone]   [facility] [phone]   [facility] [phone]   [facility] [phone]   [facility] [phone]   [facility] [phone]   [facility] [phone]   [facility] [phone]   [facility] [phone]

## 2024-05-01 VITALS
SYSTOLIC BLOOD PRESSURE: 112 MMHG | HEART RATE: 68 BPM | HEIGHT: 69 IN | WEIGHT: 209.8 LBS | DIASTOLIC BLOOD PRESSURE: 67 MMHG | OXYGEN SATURATION: 99 % | RESPIRATION RATE: 18 BRPM | BODY MASS INDEX: 31.07 KG/M2 | TEMPERATURE: 99.2 F

## 2024-05-01 PROCEDURE — 99239 HOSP IP/OBS DSCHRG MGMT >30: CPT | Performed by: EMERGENCY MEDICINE

## 2024-05-01 RX ORDER — ACAMPROSATE CALCIUM 333 MG/1
666 TABLET, DELAYED RELEASE ORAL 3 TIMES DAILY
Qty: 180 TABLET | Refills: 0 | Status: SHIPPED | OUTPATIENT
Start: 2024-05-01 | End: 2024-05-31

## 2024-05-01 RX ORDER — MULTIVIT-MIN/IRON FUM/FOLIC AC 7.5 MG-4
1 TABLET ORAL DAILY
Qty: 30 TABLET | Refills: 0 | Status: SHIPPED | OUTPATIENT
Start: 2024-05-01

## 2024-05-01 RX ORDER — ACAMPROSATE CALCIUM 333 MG/1
666 TABLET, DELAYED RELEASE ORAL 3 TIMES DAILY
Qty: 180 TABLET | Refills: 0 | Status: SHIPPED | OUTPATIENT
Start: 2024-05-01 | End: 2024-05-01

## 2024-05-01 RX ORDER — MULTIVIT-MIN/IRON FUM/FOLIC AC 7.5 MG-4
1 TABLET ORAL DAILY
Qty: 30 TABLET | Refills: 0 | Status: SHIPPED | OUTPATIENT
Start: 2024-05-01 | End: 2024-05-01

## 2024-05-01 RX ORDER — LANOLIN ALCOHOL/MO/W.PET/CERES
100 CREAM (GRAM) TOPICAL DAILY
Qty: 30 TABLET | Refills: 0 | Status: SHIPPED | OUTPATIENT
Start: 2024-05-02 | End: 2024-05-01

## 2024-05-01 RX ORDER — LANOLIN ALCOHOL/MO/W.PET/CERES
100 CREAM (GRAM) TOPICAL DAILY
Qty: 30 TABLET | Refills: 0 | Status: SHIPPED | OUTPATIENT
Start: 2024-05-02

## 2024-05-01 RX ADMIN — BACITRACIN 1 LARGE APPLICATION: 500 OINTMENT TOPICAL at 08:08

## 2024-05-01 RX ADMIN — SPIRONOLACTONE 100 MG: 100 TABLET, FILM COATED ORAL at 08:07

## 2024-05-01 RX ADMIN — PANTOPRAZOLE SODIUM 40 MG: 40 TABLET, DELAYED RELEASE ORAL at 05:58

## 2024-05-01 RX ADMIN — LACTULOSE 20 G: 20 SOLUTION ORAL at 08:07

## 2024-05-01 RX ADMIN — FOLIC ACID 1 MG: 1 TABLET ORAL at 08:07

## 2024-05-01 RX ADMIN — FUROSEMIDE 40 MG: 40 TABLET ORAL at 08:07

## 2024-05-01 RX ADMIN — RIFAXIMIN 550 MG: 550 TABLET ORAL at 08:07

## 2024-05-01 RX ADMIN — CARVEDILOL 6.25 MG: 6.25 TABLET, FILM COATED ORAL at 08:07

## 2024-05-01 RX ADMIN — LAMOTRIGINE 25 MG: 25 TABLET ORAL at 08:07

## 2024-05-01 RX ADMIN — THIAMINE HCL TAB 100 MG 100 MG: 100 TAB at 08:07

## 2024-05-01 RX ADMIN — Medication 400 MG: at 08:07

## 2024-05-01 RX ADMIN — MULTIPLE VITAMINS W/ MINERALS TAB 1 TABLET: TAB ORAL at 08:07

## 2024-05-01 NOTE — ASSESSMENT & PLAN NOTE
Continue home carvedilol, spironolactone and Lasix daily  Most Recent Blood Pressure: 112/67   Outpatient PCP follow up

## 2024-05-01 NOTE — CERTIFIED RECOVERY SPECIALIST
Certified  Note    Patient name: Curt Dominguez  Location: 5T DETOX 504/5T DETOX 50*  [facility]: [facility]  Attending:  Martha Wilson* MRN [id number]  : 1970  Age: 53 y.o.    Sex: male Date 2024         Substance Use History:     Social History     Substance and Sexual Activity   Alcohol Use Yes    Comment: Every other day        Social History     Substance and Sexual Activity   Drug Use Not Currently         Admission Information  Substances Used at This Admission:: Alcohol  Readmission in Last 30 Days?: No  Encounter Type:: Patient Face-to-Face    Recovery Support Plan  Declined All Services?: No  Medication Assisted Treatment:: Yes  Medication Therapy Type:: Induction  Medication Type::  (Acamprosate)  Is Patient Accepting RAO Treatment Services?: Yes  Was Referral Made to Center of Excellence?: No  Was Narcan Provided at Discharge?: No  Plan Discussed With Treatment Team:: No    Referral to Recovery Supports:  Recovery Center:: No  Community Based CRS:: No  Case Management:: No  Other Information:: AA online  Direct Access to RAO Treatment?: No  Resource Guide Given?: No  Follow Up With Patient:: No'    CRS provided introductions and explanation of service. CRS and patient engaged in conversation of hospitalization. Patient discussed needs he feels would be appropriate. CRS shared understanding.     Patient interested in  attending IOP, appointment scheduled for Friday 5/3.  CRS found AA meetings that are in his area for him.    Patient has multiple health issues as well as pending DUI charge.             Cary Chino

## 2024-05-01 NOTE — ASSESSMENT & PLAN NOTE
Recent Labs     04/29/24  0435 04/30/24  0528   PLT 80* 78*     Likely due to underlying bone marrow suppression and decompensated cirrhosis secondary to alcohol use  Hold DVT ppx  No active bleeding

## 2024-05-01 NOTE — PROGRESS NOTES
"Patient Name: Curt Dominguez  Patient MRN: [id number]  Date: 05/01/24  Service: [facility]    Subjective   No complaints this morning. Tolerating diet. Requesting dietitian evaluation for low salt, high protein recommendations. No CP, SOB, GI bleeding. Vitals stable. States he had \"a few BMs yesterday, one less than the day prior\" but unable to accurately quantify. RN at bedside.     Vitals  Blood pressure 112/67, pulse 68, temperature 99.2 °F (37.3 °C), temperature source Temporal, resp. rate 18, height 5' 9\" (1.753 m), weight 95.2 kg (209 lb 12.8 oz), SpO2 99%.  Physical Exam:     General Appearance:    Awake, alert, oriented x3, no distress, chronically ill appearing   Head:    Normocephalic without obvious abnormality   Eyes:    +icterus. PERRL, EOM's intact        Neck:   Supple, no adenopathy   Throat:   Mucous membranes moist. Poor dentition   Lungs:     Clear to auscultation bilaterally, no wheezing or rhonchi   Heart:    Regular rate and rhythm, S1 and S2 normal, no murmur   Abdomen:     Soft, non-tender, non-distended. bowel sounds active. No    masses, rebound or guarding.    Extremities:   Extremities without edema   Psych  Derm:   Normal affect    + jaundice. +palmar erythema    Neurologic:   CNII-XII grossly intact. Speech intact. No tremor or asterixis         Laboratory Studies  Results from last 7 days   Lab Units 04/30/24  0702 04/30/24  0528 04/29/24  0435 04/28/24  0557 04/27/24  0523 04/27/24  0450 04/26/24  1301   WBC Thousand/uL  --  5.85 6.28 5.13  --  5.47 6.01   HEMOGLOBIN g/dL  --  9.2* 9.3* 9.5*  --  9.6* 10.9*   HEMATOCRIT %  --  28.1* 28.0* 28.0*  --  27.7* 32.2*   PLATELETS Thousands/uL  --  78* 80* 83*  --  77* 94*   INR  2.60*  --  2.43*  --  2.31*  --  2.15*     Results from last 7 days   Lab Units 04/30/24  0528 04/29/24  0435 04/28/24  0557 04/27/24  0450 04/26/24  2105   SODIUM mmol/L 134* 135 135 140 140   POTASSIUM mmol/L 3.7 3.6 3.7 3.5 3.5   CHLORIDE " mmol/L 106 106 107 106 107   CO2 mmol/L 21 25 24 25 23   BUN mg/dL 9 9 7 5 5   CREATININE mg/dL 0.77 0.78 0.74 0.69 0.71   CALCIUM mg/dL 7.3* 7.2* 7.2* 7.3* 7.4*   ALBUMIN g/dL 1.9* 1.9* 1.8* 2.0* 2.2*   TOTAL BILIRUBIN mg/dL 6.47* 7.72* 8.21* 7.13* 7.01*   ALK PHOS U/L 67 67 70 75 81   ALT U/L 9 11 10 14 14   AST U/L 29 27 31 40* 38     Lab Results   Component Value Date    LIPASE 47 04/26/2024       Imaging and Other Studies      Inhouse Medications     Current Facility-Administered Medications:     acetaminophen (TYLENOL) tablet 650 mg, 650 mg, Oral, Q6H PRN    aluminum-magnesium hydroxide-simethicone (MAALOX) oral suspension 30 mL, 30 mL, Oral, Q6H PRN    bacitracin topical ointment 1 large application, 1 large application, Topical, Daily, 1 large application at 05/01/24 0808    carvedilol (COREG) tablet 6.25 mg, 6.25 mg, Oral, BID With Meals, 6.25 mg at 05/01/24 0807    folic acid (FOLVITE) tablet 1 mg, 1 mg, Oral, Daily, 1 mg at 05/01/24 0807    furosemide (LASIX) tablet 40 mg, 40 mg, Oral, Daily, 40 mg at 05/01/24 0807    lactulose (CHRONULAC) oral solution 20 g, 20 g, Oral, Daily, 20 g at 05/01/24 0807    lamoTRIgine (LaMICtal) tablet 25 mg, 25 mg, Oral, Daily, 25 mg at 05/01/24 0807    magnesium Oxide (MAG-OX) tablet 400 mg, 400 mg, Oral, BID, 400 mg at 05/01/24 0807    melatonin tablet 6 mg, 6 mg, Oral, HS PRN, 6 mg at 04/28/24 2225    multivitamin-minerals (CENTRUM) tablet 1 tablet, 1 tablet, Oral, Daily, 1 tablet at 05/01/24 0807    ondansetron (ZOFRAN-ODT) dispersible tablet 4 mg, 4 mg, Oral, Q6H PRN    pantoprazole (PROTONIX) EC tablet 40 mg, 40 mg, Oral, Early Morning, 40 mg at 05/01/24 0558    rifaximin (XIFAXAN) tablet 550 mg, 550 mg, Oral, Q12H ADRIAN, 550 mg at 05/01/24 0807    spironolactone (ALDACTONE) tablet 100 mg, 100 mg, Oral, Daily, 100 mg at 05/01/24 0807    thiamine tablet 100 mg, 100 mg, Oral, Daily, 100 mg at 05/01/24 0807      Assessment/Plan:    Decompensated alcoholic cirrhosis  complicated by ascites, hepatic hydrothorax, hepatic encephalopathy, pHTN, hx SBP, thrombocytopenia, anemia, coagulopathy. No history of variceal bleeding. Admitted with alcohol intoxication with alcoholic hepatitis (DF improved from last month). Recurrent R-sided pleural effusion 2/2 hepatic hydrothorax.     MDF = 65 (this adm) >75 > 81  MELD-Na =23 (this adm)> 25 > 26  MELD = 23 (this adm) > 24  > 24     Despite elevated DF >32, given improvement over the past month and medication noncompliance, at this time favor holding off steroids. Continue to monitor. Absolute alcohol abstinence.   Liver transplant evaluation status - not likely transplant candidate given poor compliance and poor social support  Volume: small volume ascites on 4/29 CT imaging, large R pleural effusion again noted. Monitor daily weight (down 4lb since 4/29). Currently on Lasix 40mg daily, spironolactone 100mg daily, and 2g Na diet. Gynecomastia may limit spironolactone use. Avoid chest tube placement. Poor TIPS candidate with history of HE.   Hepatic encephalopathy: currently controlled. Adm ammonia WNL. Continue lactulose titrated to produce 2-3 soft stools daily and Xifaxan BID. Monitor clinically and stool output.    Esophageal varices: Last EGD 11/2023 without varices or PHG. Repeat 2025, sooner if active gi bleeding.   Labs: Anemia low/stable, sodium WNL, liver enzymes WNL (except bilirubin primarily indirect), PT/INR rising  Hepatoma screening: Imaging UTD, AFP (5.33) WNL.   Viral hepatitis B/C negative. Further serologies as outpatient after recovery.   Electrolyte correction per primary.   Would benefit from PT/OT. Continue high protein diet, consider ensure supplemention (pt defers, does not like flavors). Inpatient EtOH rehab when accepted by facility. Weekly labs recommended. Will ask dietitian to see per patient's request.   Colon Cancer screening: unknown.      Leta Felder PA-C

## 2024-05-01 NOTE — PLAN OF CARE
Problem: DISCHARGE PLANNING  Goal: Discharge to home or other facility with appropriate resources  Description: INTERVENTIONS:  - Identify barriers to discharge w/patient and caregiver  - Arrange for needed discharge resources and transportation as appropriate  - Identify discharge learning needs (meds, wound care, etc.)  - Arrange for interpretive services to assist at discharge as needed  - Refer to Case Management Department for coordinating discharge planning if the patient needs post-hospital services based on physician/advanced practitioner order or complex needs related to functional status, cognitive ability, or social support system  Outcome: Progressing     Problem: Knowledge Deficit  Goal: Patient/family/caregiver demonstrates understanding of disease process, treatment plan, medications, and discharge instructions  Description: Complete learning assessment and assess knowledge base.  Interventions:  - Provide teaching at level of understanding  - Provide teaching via preferred learning methods  Outcome: Progressing     Problem: PAIN - ADULT  Goal: Verbalizes/displays adequate comfort level or baseline comfort level  Description: Interventions:  - Encourage patient to monitor pain and request assistance  - Assess pain using appropriate pain scale  - Administer analgesics based on type and severity of pain and evaluate response  - Implement non-pharmacological measures as appropriate and evaluate response  - Consider cultural and social influences on pain and pain management  - Notify physician/advanced practitioner if interventions unsuccessful or patient reports new pain  Outcome: Progressing

## 2024-05-01 NOTE — ASSESSMENT & PLAN NOTE
Recent Labs     04/29/24  0435 04/30/24  0528   TBILI 7.72* 6.47*     Secondary to underlying decompensated cirrhosis due to alcohol use  Outpatient hepatology follow up

## 2024-05-01 NOTE — ASSESSMENT & PLAN NOTE
Recent Labs     04/29/24  0435 04/30/24  0528   HGB 9.3* 9.2*     Suspect secondary to underlying cirrhosis and alcohol use  Iron panel grossly normal  No bleeding  Outpatient follow up

## 2024-05-01 NOTE — INCIDENTAL FINDINGS
"The following findings require follow up:  Radiographic finding   Finding: \"1. Unchanged large right pleural effusion with partial atelectasis of the right lower lobe. No new findings in the chest., 2. Cholelithiasis. Gallbladder wall thickening is nonspecific in the setting of liver disease and volume overload, but acute cholecystitis cannot be excluded in the appropriate clinical setting. This may be evaluated with right upper quadrant ultrasound, if indicated., 3. Mild peripancreatic stranding may be related to venous congestion in setting of portal hypertension but should be correlated clinically for evidence of acute pancreatitis., 4. Cirrhosis with portal hypertension., 5. Mild bladder wall thickening versus prominence from under distention. Correlate for evidence of cystitis., 6. Stable small posterior subcutaneous hematoma\"   Follow up required: GI, pulmonology. PCP   Follow up should be done within 1 month(s)    Please notify the following clinician to assist with the follow up:   Dr. Montano, [facility]  "

## 2024-05-01 NOTE — ASSESSMENT & PLAN NOTE
"Pt with a h/o alcoholic cirrhosis  CT chest/abd/pelvis 4/29- No significant change with right sided pleural effusion   CT chest/abd/pelvis 4/26 revealed, \"Hepatic cirrhosis and evidence of portal hypertension. Probable cavernous transformation of the main portal vein without evidence of acute thrombosis\"  Recent RUQ U/S 3/21/24 with findings also suggestive of portal HTN  Elevated PT/INR and PTT   Reviewed recent GI consult note from 3/21/24 from previous admission:  \"Child Class C  Life Expectancy :  1-3 years  Abdominal surgery thomas-operative mortality: 82%\"  Pleural effusion likely transudative in origin and due to hepatic hydrothorax; however, pt poor candidate for TIPS procedure due to h/o hepatic encephalopathy  Will continue their recommendations including home medications of spironolactone, Lasix, lactulose, and Rifaximin, as well as low-salt diet  GI consulted:  Educated on alcohol cessation   DF > 32, not a candidate for liver transplant   Continue Lactulose and Rifaximin, titrate to bowel movements  Continue 2g sodium, high protein diet   Continue diuretic regimen  Outpatient Follow up -CM to assist with follow up appointments   Encourage alcohol cessation   Recommend outpatient follow-up GI/hepatology for ongoing monitoring/maintenance; will require weekly labs per GI  " No need to run this PA if Hermilo is not having pruritis (itching) symptoms since stopping omeprazole

## 2024-05-01 NOTE — ASSESSMENT & PLAN NOTE
Pt with a h/o recent R pleural effusion due to hepatic hydrothorax  Pulmonology was consulted on 4/29/24   As patient is currently asymptomatic defer thoracentesis   Continue diuretic regimen of Lasix 40 mg, Spirolactone 100 mg   2 g sodium diet   Patient will require outpatient pulmonology follow up--appointment scheduled for 5/7

## 2024-05-01 NOTE — NURSING NOTE
Pt d/c to home. All medications instructions sent with pt. Pt expressed knowledge. Pt made aware of local AA meetings to attend and expressed knowledge of f/u appts.

## 2024-05-01 NOTE — ASSESSMENT & PLAN NOTE
Recent Labs     04/29/24  0435 04/30/24  0702   INR 2.43* 2.60*     Due to underlying decompensated cirrhosis  Stable  Outpatient GI/hepatology follow up

## 2024-05-01 NOTE — ASSESSMENT & PLAN NOTE
Continue daily thiamine, folate, and multivitamin supplementation  Patient is not a good candidate for naltrexone given decompensated cirrhosis; discussed acamprosate--patient is interested, will prescribe upon discharge  Case management consulted for disposition planning; patient is interested in outpatient IOP

## 2024-05-02 NOTE — UTILIZATION REVIEW
NOTIFICATION OF ADMISSION DISCHARGE   This is a Notification of Discharge from [facility]. Please be advised that this patient has been discharge from our facility. Below you will find the admission and discharge date and time including the patient’s disposition.   UTILIZATION REVIEW CONTACT:  Apurva Blakely MA  Utilization   Network Utilization Review Department  Phone: [phone] x carefully listen to the prompts. All voicemails are confidential.  Email: [email or web address]     ADMISSION INFORMATION  PRESENTATION DATE: 4/26/2024  8:24 PM  OBERVATION ADMISSION DATE:   INPATIENT ADMISSION DATE: 4/26/24  8:24 PM   DISCHARGE DATE: 5/1/2024  2:47 PM   DISPOSITION:Home/Self Care    Network Utilization Review Department  ATTENTION: Please call with any questions or concerns to [phone] and carefully listen to the prompts so that you are directed to the right person. All voicemails are confidential.   For Discharge needs, contact Care Management DC Support Team at [phone] opt. 2  Send all requests for admission clinical reviews, approved or denied determinations and any other requests to dedicated fax number below belonging to the campus where the patient is receiving treatment. List of dedicated fax numbers for the Facilities:  FACILITY NAME UR FAX NUMBER   ADMISSION DENIALS (Administrative/Medical Necessity) [phone]   DISCHARGE SUPPORT TEAM ([facility]) [phone]   PARENT CHILD HEALTH (Maternity/NICU/Pediatrics) [phone]   [facility] [phone]   [facility] [phone]   [facility] [phone]   [facility] [phone]   [facility] [phone]   [facility] [phone]   [facility] [phone]   [facility]  [phone]   [facility] [phone]   [facility] [phone]   [facility] [phone]   [facility] [phone]

## 2024-06-27 ENCOUNTER — HOSPITAL ENCOUNTER (EMERGENCY)
Facility: HOSPITAL | Age: 54
Discharge: HOME | End: 2024-06-27
Attending: EMERGENCY MEDICINE
Payer: COMMERCIAL

## 2024-06-27 ENCOUNTER — APPOINTMENT (EMERGENCY)
Dept: RADIOLOGY | Facility: HOSPITAL | Age: 54
End: 2024-06-27
Payer: COMMERCIAL

## 2024-06-27 ENCOUNTER — APPOINTMENT (EMERGENCY)
Dept: CT IMAGING | Facility: HOSPITAL | Age: 54
End: 2024-06-27
Payer: COMMERCIAL

## 2024-06-27 VITALS
OXYGEN SATURATION: 98 % | BODY MASS INDEX: 26.71 KG/M2 | WEIGHT: 180.34 LBS | DIASTOLIC BLOOD PRESSURE: 77 MMHG | SYSTOLIC BLOOD PRESSURE: 128 MMHG | HEIGHT: 69 IN | TEMPERATURE: 98 F | HEART RATE: 71 BPM | RESPIRATION RATE: 14 BRPM

## 2024-06-27 DIAGNOSIS — E83.42 HYPOMAGNESEMIA: ICD-10-CM

## 2024-06-27 DIAGNOSIS — F10.10 ALCOHOL ABUSE: Primary | ICD-10-CM

## 2024-06-27 DIAGNOSIS — K80.20 CALCULUS OF GALLBLADDER WITHOUT CHOLECYSTITIS WITHOUT OBSTRUCTION: ICD-10-CM

## 2024-06-27 LAB
2HR DELTA HS TROPONIN: -1 NG/L
ALBUMIN SERPL BCG-MCNC: 2.9 G/DL (ref 3.5–5)
ALP SERPL-CCNC: 91 U/L (ref 34–104)
ALT SERPL W P-5'-P-CCNC: 17 U/L (ref 7–52)
ANION GAP SERPL CALCULATED.3IONS-SCNC: 8 MMOL/L (ref 4–13)
APTT PPP: 39 SECONDS (ref 23–37)
AST SERPL W P-5'-P-CCNC: 31 U/L (ref 13–39)
BASOPHILS # BLD AUTO: 0.06 THOUSANDS/ÂΜL (ref 0–0.1)
BASOPHILS NFR BLD AUTO: 1 % (ref 0–1)
BILIRUB SERPL-MCNC: 6.9 MG/DL (ref 0.2–1)
BNP SERPL-MCNC: 39 PG/ML (ref 0–100)
BUN SERPL-MCNC: 6 MG/DL (ref 5–25)
CALCIUM ALBUM COR SERPL-MCNC: 9.9 MG/DL (ref 8.3–10.1)
CALCIUM SERPL-MCNC: 9 MG/DL (ref 8.4–10.2)
CARDIAC TROPONIN I PNL SERPL HS: 5 NG/L
CARDIAC TROPONIN I PNL SERPL HS: 6 NG/L
CHLORIDE SERPL-SCNC: 103 MMOL/L (ref 96–108)
CO2 SERPL-SCNC: 26 MMOL/L (ref 21–32)
CREAT SERPL-MCNC: 0.84 MG/DL (ref 0.6–1.3)
D DIMER PPP FEU-MCNC: 1.19 UG/ML FEU
EOSINOPHIL # BLD AUTO: 0.16 THOUSAND/ÂΜL (ref 0–0.61)
EOSINOPHIL NFR BLD AUTO: 3 % (ref 0–6)
ERYTHROCYTE [DISTWIDTH] IN BLOOD BY AUTOMATED COUNT: 16.2 % (ref 11.6–15.1)
ETHANOL SERPL-MCNC: 58 MG/DL
GFR SERPL CREATININE-BSD FRML MDRD: 101 ML/MIN/1.73SQ M
GLUCOSE SERPL-MCNC: 112 MG/DL (ref 65–140)
HCT VFR BLD AUTO: 35.4 % (ref 36.5–49.3)
HGB BLD-MCNC: 12.5 G/DL (ref 12–17)
IMM GRANULOCYTES # BLD AUTO: 0.02 THOUSAND/UL (ref 0–0.2)
IMM GRANULOCYTES NFR BLD AUTO: 0 % (ref 0–2)
INR PPP: 1.74 (ref 0.84–1.19)
LACTATE SERPL-SCNC: 1.7 MMOL/L (ref 0.5–2)
LACTATE SERPL-SCNC: 2.6 MMOL/L (ref 0.5–2)
LIPASE SERPL-CCNC: 29 U/L (ref 11–82)
LYMPHOCYTES # BLD AUTO: 2.15 THOUSANDS/ÂΜL (ref 0.6–4.47)
LYMPHOCYTES NFR BLD AUTO: 43 % (ref 14–44)
MAGNESIUM SERPL-MCNC: 1.6 MG/DL (ref 1.9–2.7)
MCH RBC QN AUTO: 34 PG (ref 26.8–34.3)
MCHC RBC AUTO-ENTMCNC: 35.3 G/DL (ref 31.4–37.4)
MCV RBC AUTO: 96 FL (ref 82–98)
MONOCYTES # BLD AUTO: 0.73 THOUSAND/ÂΜL (ref 0.17–1.22)
MONOCYTES NFR BLD AUTO: 15 % (ref 4–12)
NEUTROPHILS # BLD AUTO: 1.87 THOUSANDS/ÂΜL (ref 1.85–7.62)
NEUTS SEG NFR BLD AUTO: 38 % (ref 43–75)
NRBC BLD AUTO-RTO: 0 /100 WBCS
PLATELET # BLD AUTO: 110 THOUSANDS/UL (ref 149–390)
PMV BLD AUTO: 9.7 FL (ref 8.9–12.7)
POTASSIUM SERPL-SCNC: 4.1 MMOL/L (ref 3.5–5.3)
PROT SERPL-MCNC: 6.5 G/DL (ref 6.4–8.4)
PROTHROMBIN TIME: 20.6 SECONDS (ref 11.6–14.5)
RBC # BLD AUTO: 3.68 MILLION/UL (ref 3.88–5.62)
SODIUM SERPL-SCNC: 137 MMOL/L (ref 135–147)
WBC # BLD AUTO: 4.99 THOUSAND/UL (ref 4.31–10.16)

## 2024-06-27 PROCEDURE — C9113 INJ PANTOPRAZOLE SODIUM, VIA: HCPCS | Performed by: EMERGENCY MEDICINE

## 2024-06-27 PROCEDURE — 82077 ASSAY SPEC XCP UR&BREATH IA: CPT | Performed by: EMERGENCY MEDICINE

## 2024-06-27 PROCEDURE — 84484 ASSAY OF TROPONIN QUANT: CPT | Performed by: EMERGENCY MEDICINE

## 2024-06-27 PROCEDURE — 85379 FIBRIN DEGRADATION QUANT: CPT | Performed by: EMERGENCY MEDICINE

## 2024-06-27 PROCEDURE — 93005 ELECTROCARDIOGRAM TRACING: CPT

## 2024-06-27 PROCEDURE — 83735 ASSAY OF MAGNESIUM: CPT | Performed by: EMERGENCY MEDICINE

## 2024-06-27 PROCEDURE — 85730 THROMBOPLASTIN TIME PARTIAL: CPT | Performed by: EMERGENCY MEDICINE

## 2024-06-27 PROCEDURE — 85025 COMPLETE CBC W/AUTO DIFF WBC: CPT | Performed by: EMERGENCY MEDICINE

## 2024-06-27 PROCEDURE — 85610 PROTHROMBIN TIME: CPT | Performed by: EMERGENCY MEDICINE

## 2024-06-27 PROCEDURE — 80053 COMPREHEN METABOLIC PANEL: CPT | Performed by: EMERGENCY MEDICINE

## 2024-06-27 PROCEDURE — 99285 EMERGENCY DEPT VISIT HI MDM: CPT | Performed by: EMERGENCY MEDICINE

## 2024-06-27 PROCEDURE — 36415 COLL VENOUS BLD VENIPUNCTURE: CPT | Performed by: EMERGENCY MEDICINE

## 2024-06-27 PROCEDURE — 71275 CT ANGIOGRAPHY CHEST: CPT

## 2024-06-27 PROCEDURE — 74177 CT ABD & PELVIS W/CONTRAST: CPT

## 2024-06-27 PROCEDURE — 83690 ASSAY OF LIPASE: CPT | Performed by: EMERGENCY MEDICINE

## 2024-06-27 PROCEDURE — 83605 ASSAY OF LACTIC ACID: CPT | Performed by: EMERGENCY MEDICINE

## 2024-06-27 PROCEDURE — 83880 ASSAY OF NATRIURETIC PEPTIDE: CPT | Performed by: EMERGENCY MEDICINE

## 2024-06-27 PROCEDURE — 71045 X-RAY EXAM CHEST 1 VIEW: CPT

## 2024-06-27 RX ORDER — PANTOPRAZOLE SODIUM 40 MG/10ML
40 INJECTION, POWDER, LYOPHILIZED, FOR SOLUTION INTRAVENOUS ONCE
Status: COMPLETED | OUTPATIENT
Start: 2024-06-27 | End: 2024-06-27

## 2024-06-27 RX ORDER — MAGNESIUM HYDROXIDE/ALUMINUM HYDROXICE/SIMETHICONE 120; 1200; 1200 MG/30ML; MG/30ML; MG/30ML
30 SUSPENSION ORAL ONCE
Status: COMPLETED | OUTPATIENT
Start: 2024-06-27 | End: 2024-06-27

## 2024-06-27 RX ORDER — LIDOCAINE HYDROCHLORIDE 20 MG/ML
15 SOLUTION OROPHARYNGEAL ONCE
Status: COMPLETED | OUTPATIENT
Start: 2024-06-27 | End: 2024-06-27

## 2024-06-27 RX ORDER — MAGNESIUM SULFATE 1 G/100ML
1 INJECTION INTRAVENOUS ONCE
Status: COMPLETED | OUTPATIENT
Start: 2024-06-27 | End: 2024-06-27

## 2024-06-27 RX ADMIN — LIDOCAINE HYDROCHLORIDE 15 ML: 20 SOLUTION ORAL at 10:44

## 2024-06-27 RX ADMIN — PANTOPRAZOLE SODIUM 40 MG: 40 INJECTION, POWDER, LYOPHILIZED, FOR SOLUTION INTRAVENOUS at 10:41

## 2024-06-27 RX ADMIN — SODIUM CHLORIDE 1000 ML: 0.9 INJECTION, SOLUTION INTRAVENOUS at 10:35

## 2024-06-27 RX ADMIN — MAGNESIUM SULFATE HEPTAHYDRATE 1 G: 1 INJECTION, SOLUTION INTRAVENOUS at 11:16

## 2024-06-27 RX ADMIN — IOHEXOL 100 ML: 350 INJECTION, SOLUTION INTRAVENOUS at 11:21

## 2024-06-27 RX ADMIN — ALUMINUM HYDROXIDE, MAGNESIUM HYDROXIDE, AND DIMETHICONE 30 ML: 200; 20; 200 SUSPENSION ORAL at 10:43

## 2024-06-27 NOTE — ED PROVIDER NOTES
History  Chief Complaint   Patient presents with    Chest Pain     Pt c/o chest pain w/sob, dizziness, and weakness starting at 0200 this morning. Pt d/c'd from detox yesterday. Denies travel/fevers/cough/n/v/d     Patient states he was lying down when he developed parasternal chest pain.  No radiation.  Felt short of breath and dizzy.  No change with deep breath or movement.  No history of PE or DVT.  Legs are normally swollen but no change.  No recent cough or cold symptoms.  No history of heart disease.  Family history of heart disease.  Does have hypertension.  Taking his medications.  Just got out of detox yesterday.  Was in detox for 5 days only.  Had 2 beers since.      History provided by:  Patient   used: No    Chest Pain  Pain location:  Substernal area  Pain quality: aching    Pain radiates to:  Does not radiate  Pain radiates to the back: no    Pain severity:  Mild  Onset quality:  Sudden  Duration:  8 hours  Timing:  Constant  Progression:  Unchanged  Chronicity:  New  Context: at rest    Context: not breathing    Relieved by:  Nothing  Worsened by:  Nothing tried  Ineffective treatments:  None tried  Associated symptoms: dizziness, lower extremity edema, shortness of breath and weakness    Associated symptoms: no abdominal pain, no back pain, no cough, no dysphagia, no fever, no headache, no nausea, no palpitations and not vomiting        Prior to Admission Medications   Prescriptions Last Dose Informant Patient Reported? Taking?   FLUoxetine (PROzac) 10 mg capsule   Yes No   Sig: Take 20 mg by mouth daily   Magnesium Oxide 400 MG CAPS   Yes No   Sig: Take 400 mg by mouth in the morning   Multiple Vitamins-Minerals (multivitamin with minerals) tablet   No No   Sig: Take 1 tablet by mouth daily   acamprosate (CAMPRAL) 333 mg tablet   No No   Sig: Take 2 tablets (666 mg total) by mouth 3 (three) times a day   carvedilol (COREG) 6.25 mg tablet   Yes No   Sig: Take 6.25 mg by mouth 2  (two) times a day with meals   folic acid (FOLVITE) 1 mg tablet   Yes No   Sig: Take 1 mg by mouth daily   furosemide (LASIX) 40 mg tablet   No No   Sig: Take 1 tablet (40 mg total) by mouth daily   lactulose 20 g/30 mL   Yes No   Sig: Take 30 g by mouth daily   lamoTRIgine (LaMICtal) 25 mg tablet   Yes No   Sig: Take 25 mg by mouth daily   omeprazole (PriLOSEC) 40 MG capsule   Yes No   Sig: Take 40 mg by mouth daily   rifaximin (XIFAXAN) 550 mg tablet   Yes No   Sig: Take 550 mg by mouth in the morning   spironolactone (ALDACTONE) 100 mg tablet   No No   Sig: Take 1 tablet (100 mg total) by mouth daily   thiamine 100 MG tablet   No No   Sig: Take 1 tablet (100 mg total) by mouth daily Do not start before May 2, 2024.      Facility-Administered Medications: None       Past Medical History:   Diagnosis Date    Acute respiratory failure with hypoxia (HCC) 03/20/2024    Alcohol abuse     Depression     Dysphagia     Fracture of one rib, left side, initial encounter for closed fracture     GERD (gastroesophageal reflux disease)     Hypertension 03/22/2024    Hypokalemia     Hyponatremia 03/20/2024    Lactic acid acidosis 03/20/2024    Liver failure (HCC)     Muscle wasting and atrophy, not elsewhere classified, multiple sites     Muscle weakness     SERGEI (obstructive sleep apnea)     Other disorders of bilirubin metabolism     Pleural effusion     Sacral fracture (HCC)     SIRS (systemic inflammatory response syndrome) (HCC) 03/20/2024    Splenic rupture        Past Surgical History:   Procedure Laterality Date    BRAIN SURGERY      IR CHEST TUBE PLACEMENT  3/20/2024       History reviewed. No pertinent family history.  I have reviewed and agree with the history as documented.    E-Cigarette/Vaping    E-Cigarette Use Never User      E-Cigarette/Vaping Substances     Social History     Tobacco Use    Smoking status: Never    Smokeless tobacco: Never   Vaping Use    Vaping status: Never Used   Substance Use Topics     Alcohol use: Yes     Comment: Every other day    Drug use: Not Currently       Review of Systems   Constitutional:  Negative for chills and fever.   HENT:  Negative for ear pain, hearing loss, sore throat, trouble swallowing and voice change.    Eyes:  Negative for pain and discharge.   Respiratory:  Positive for shortness of breath. Negative for cough and wheezing.    Cardiovascular:  Positive for chest pain. Negative for palpitations.   Gastrointestinal:  Negative for abdominal pain, blood in stool, constipation, diarrhea, nausea and vomiting.   Genitourinary:  Negative for dysuria, flank pain, frequency and hematuria.   Musculoskeletal:  Negative for back pain, joint swelling, neck pain and neck stiffness.   Skin:  Negative for rash and wound.   Neurological:  Positive for dizziness and weakness. Negative for seizures, syncope, facial asymmetry and headaches.   Psychiatric/Behavioral:  Negative for hallucinations, self-injury and suicidal ideas.    All other systems reviewed and are negative.      Physical Exam  Physical Exam  Vitals and nursing note reviewed.   Constitutional:       General: He is not in acute distress.     Appearance: He is well-developed.   HENT:      Head: Normocephalic and atraumatic.      Right Ear: External ear normal.      Left Ear: External ear normal.   Eyes:      General: No scleral icterus.        Right eye: No discharge.         Left eye: No discharge.      Extraocular Movements: Extraocular movements intact.      Conjunctiva/sclera: Conjunctivae normal.   Cardiovascular:      Rate and Rhythm: Normal rate and regular rhythm.      Heart sounds: Normal heart sounds. No murmur heard.  Pulmonary:      Effort: Pulmonary effort is normal.      Breath sounds: Normal breath sounds. No wheezing or rales.   Abdominal:      General: Bowel sounds are normal. There is no distension.      Palpations: Abdomen is soft.      Tenderness: There is no abdominal tenderness. There is no guarding or  rebound.   Musculoskeletal:         General: No deformity. Normal range of motion.      Cervical back: Normal range of motion and neck supple.   Skin:     General: Skin is warm.      Coloration: Skin is pale.      Findings: No rash.   Neurological:      General: No focal deficit present.      Mental Status: He is alert and oriented to person, place, and time.      Cranial Nerves: No cranial nerve deficit.   Psychiatric:         Mood and Affect: Mood normal.         Behavior: Behavior normal.         Thought Content: Thought content normal.         Judgment: Judgment normal.         Vital Signs  ED Triage Vitals [06/27/24 1027]   Temperature Pulse Respirations Blood Pressure SpO2   98 °F (36.7 °C) 76 18 119/76 100 %      Temp src Heart Rate Source Patient Position - Orthostatic VS BP Location FiO2 (%)   -- Monitor Lying Right arm --      Pain Score       8           Vitals:    06/27/24 1130 06/27/24 1215 06/27/24 1245 06/27/24 1300   BP: 118/70 125/72 138/77 138/82   Pulse: 70 73 72 75   Patient Position - Orthostatic VS: Lying Lying Lying Lying         Visual Acuity      ED Medications  Medications   pantoprazole (PROTONIX) injection 40 mg (40 mg Intravenous Given 6/27/24 1041)   sodium chloride 0.9 % bolus 1,000 mL (0 mL Intravenous Stopped 6/27/24 1135)   aluminum-magnesium hydroxide-simethicone (MAALOX) oral suspension 30 mL (30 mL Oral Given 6/27/24 1043)   Lidocaine Viscous HCl (XYLOCAINE) 2 % mucosal solution 15 mL (15 mL Swish & Spit Given 6/27/24 1044)   magnesium sulfate IVPB (premix) SOLN 1 g (1 g Intravenous New Bag 6/27/24 1116)   iohexol (OMNIPAQUE) 350 MG/ML injection (MULTI-DOSE) 100 mL (100 mL Intravenous Given 6/27/24 1121)       Diagnostic Studies  Results Reviewed       Procedure Component Value Units Date/Time    Lactic acid 2 Hours [[id number]]  (Normal) Collected: 06/27/24 1214    Lab Status: Final result Specimen: Blood from Arm, Right Updated: 06/27/24 1249     LACTIC ACID 1.7 mmol/L      Narrative:      Result may be elevated if tourniquet was used during collection.    HS Troponin I 2hr [[id number]]  (Normal) Collected: 06/27/24 1214    Lab Status: Final result Specimen: Blood from Arm, Right Updated: 06/27/24 1243     hs TnI 2hr 5 ng/L      Delta 2hr hsTnI -1 ng/L     B-Type Natriuretic Peptide(BNP) [[id number]]  (Normal) Collected: 06/27/24 1033    Lab Status: Final result Specimen: Blood from Arm, Right Updated: 06/27/24 1127     BNP 39 pg/mL     HS Troponin 0hr (reflex protocol) [[id number]]  (Normal) Collected: 06/27/24 1033    Lab Status: Final result Specimen: Blood from Arm, Right Updated: 06/27/24 1110     hs TnI 0hr 6 ng/L     D-Dimer [[id number]]  (Abnormal) Collected: 06/27/24 1033    Lab Status: Final result Specimen: Blood from Arm, Right Updated: 06/27/24 1108     D-Dimer, Quant 1.19 ug/ml FEU     Narrative:      In the evaluation for possible pulmonary embolism, in the appropriate (Well's Score of 4 or less) patient, the age adjusted d-dimer cutoff for this patient can be calculated as:    Age x 0.01 (in ug/mL) for Age-adjusted D-dimer exclusion threshold for a patient over 50 years.    Protime-INR [[id number]]  (Abnormal) Collected: 06/27/24 1033    Lab Status: Final result Specimen: Blood from Arm, Right Updated: 06/27/24 1108     Protime 20.6 seconds      INR 1.74    APTT [[id number]]  (Abnormal) Collected: 06/27/24 1033    Lab Status: Final result Specimen: Blood from Arm, Right Updated: 06/27/24 1108     PTT 39 seconds     Lactic acid, plasma (w/reflex if result > 2.0) [[id number]]  (Abnormal) Collected: 06/27/24 1033    Lab Status: Final result Specimen: Blood from Arm, Right Updated: 06/27/24 1108     LACTIC ACID 2.6 mmol/L     Narrative:      Specimen Icteric.  Result may be elevated if tourniquet was used during collection.    Comprehensive metabolic panel [[id number]]  (Abnormal) Collected: 06/27/24 1034    Lab Status: Final result Specimen: Blood from Arm, Right Updated: 06/27/24  1107     Sodium 137 mmol/L      Potassium 4.1 mmol/L      Chloride 103 mmol/L      CO2 26 mmol/L      ANION GAP 8 mmol/L      BUN 6 mg/dL      Creatinine 0.84 mg/dL      Glucose 112 mg/dL      Calcium 9.0 mg/dL      Corrected Calcium 9.9 mg/dL      AST 31 U/L      ALT 17 U/L      Alkaline Phosphatase 91 U/L      Total Protein 6.5 g/dL      Albumin 2.9 g/dL      Total Bilirubin 6.90 mg/dL      eGFR 101 ml/min/1.73sq m     Narrative:      Specimen Icteric.  National Kidney Disease Foundation guidelines for Chronic Kidney Disease (CKD):     Stage 1 with normal or high GFR (GFR > 90 mL/min/1.73 square meters)    Stage 2 Mild CKD (GFR = 60-89 mL/min/1.73 square meters)    Stage 3A Moderate CKD (GFR = 45-59 mL/min/1.73 square meters)    Stage 3B Moderate CKD (GFR = 30-44 mL/min/1.73 square meters)    Stage 4 Severe CKD (GFR = 15-29 mL/min/1.73 square meters)    Stage 5 End Stage CKD (GFR <15 mL/min/1.73 square meters)  Note: GFR calculation is accurate only with a steady state creatinine    Lipase [[id number]]  (Normal) Collected: 06/27/24 1034    Lab Status: Final result Specimen: Blood from Arm, Right Updated: 06/27/24 1107     Lipase 29 u/L     Narrative:      Specimen Icteric.    Magnesium [[id number]]  (Abnormal) Collected: 06/27/24 1034    Lab Status: Final result Specimen: Blood from Arm, Right Updated: 06/27/24 1107     Magnesium 1.6 mg/dL     Narrative:      Specimen Icteric.    Ethanol [[id number]]  (Abnormal) Collected: 06/27/24 1034    Lab Status: Final result Specimen: Blood from Arm, Right Updated: 06/27/24 1107     Ethanol Lvl 58 mg/dL     Narrative:      Specimen Icteric.    CBC and differential [[id number]]  (Abnormal) Collected: 06/27/24 1033    Lab Status: Final result Specimen: Blood from Arm, Right Updated: 06/27/24 1042     WBC 4.99 Thousand/uL      RBC 3.68 Million/uL      Hemoglobin 12.5 g/dL      Hematocrit 35.4 %      MCV 96 fL      MCH 34.0 pg      MCHC 35.3 g/dL      RDW 16.2 %      MPV 9.7 fL       Platelets 110 Thousands/uL      nRBC 0 /100 WBCs      Segmented % 38 %      Immature Grans % 0 %      Lymphocytes % 43 %      Monocytes % 15 %      Eosinophils Relative 3 %      Basophils Relative 1 %      Absolute Neutrophils 1.87 Thousands/µL      Absolute Immature Grans 0.02 Thousand/uL      Absolute Lymphocytes 2.15 Thousands/µL      Absolute Monocytes 0.73 Thousand/µL      Eosinophils Absolute 0.16 Thousand/µL      Basophils Absolute 0.06 Thousands/µL                    PE Study with CT Abdomen and Pelvis with contrast   Final Result by Yoav Blancas MD (06/27 1305)      No pulmonary embolus.      Decreased now small right pleural effusion.      Cholelithiasis, including a gallstone in the gallbladder neck. Mild gallbladder distention, wall thickening, and pericholecystic stranding is similar to prior exams. If there is concern for acute cholecystitis, recommend right upper quadrant ultrasound.      Cirrhosis with sequela of portal hypertension.                  Workstation performed: [id number]         XR chest 1 view portable   ED Interpretation by Ernesto Cordero MD (06/27 1051)   NAD                 Procedures  ECG 12 Lead Documentation Only    Date/Time: 6/27/2024 10:33 AM    Performed by: Ernesto Cordero MD  Authorized by: Ernesto Cordero MD    ECG reviewed by me, the ED Provider: yes    Patient location:  ED  Previous ECG:     Previous ECG:  Compared to current    Similarity:  No change  Rate:     ECG rate:  80  Rhythm:     Rhythm: sinus rhythm    Ectopy:     Ectopy: none    QRS:     QRS axis:  Normal           ED Course  ED Course as of 06/27/24 1315   Thu Jun 27, 2024   1112 Patient seen.  Feeling better after Protonix, milk of magnesia and viscous lidocaine.   1115 Total Bilirubin(!): 6.90  This is about baseline for the patient.   1312 Patient seen.  No pain at present.  Discussed with patient about his lab and CAT scan results.  Made aware of cholelithiasis.  Doubt acute  cholecystitis as the patient has no pain.  His bilirubin is 6.9 today but that is unchanged from previous.  LFTs are normal.             HEART Risk Score      Flowsheet Row Most Recent Value   Heart Score Risk Calculator    History 1 Filed at: 06/27/2024 1034   ECG 0 Filed at: 06/27/2024 1034   Age 1 Filed at: 06/27/2024 1034   Risk Factors 1 Filed at: 06/27/2024 1034   Troponin 0 Filed at: 06/27/2024 1034   HEART Score 3 Filed at: 06/27/2024 1034                                        Medical Decision Making  Amount and/or Complexity of Data Reviewed  Labs: ordered. Decision-making details documented in ED Course.  Radiology: ordered and independent interpretation performed.    Risk  OTC drugs.  Prescription drug management.             Disposition  Final diagnoses:   Alcohol abuse   Hypomagnesemia   Calculus of gallbladder without cholecystitis without obstruction     Time reflects when diagnosis was documented in both MDM as applicable and the Disposition within this note       Time User Action Codes Description Comment    6/27/2024 11:22 AM Ernesto Cordero Add [F10.10] Alcohol abuse     6/27/2024 11:22 AM Ernesto Cordero Add [E83.42] Hypomagnesemia     6/27/2024  1:14 PM Ernesto Cordero Add [K80.20] Calculus of gallbladder without cholecystitis without obstruction           ED Disposition       ED Disposition   Discharge    Condition   Stable    Date/Time   Thu Jun 27, 2024 1314    Comment   Curt Dominguez discharge to home/self care.                   Follow-up Information       Follow up With Specialties Details Why Contact Info    Leonela Oseguera MD Internal Medicine Call in 2 days  [address] [facility] [address]  [phone]      Elba Morrow DO General Surgery Call in 1 week  [address] [facility]  1st Floor  [facility] [address]  [phone]              Patient's Medications   Discharge Prescriptions    No medications on file       No discharge procedures on file.    PDMP Review          Value Time User    PDMP Reviewed  Yes 4/26/2024 11:00 PM Bela Shields PA-C            ED Provider  Electronically Signed by             Ernesto Cordero MD  06/27/24 1342       Ernesto Cordero MD  06/27/24 7241

## 2024-06-28 LAB
ATRIAL RATE: 78 BPM
P AXIS: 68 DEGREES
PR INTERVAL: 98 MS
QRS AXIS: 72 DEGREES
QRSD INTERVAL: 82 MS
QT INTERVAL: 434 MS
QTC INTERVAL: 494 MS
T WAVE AXIS: 65 DEGREES
VENTRICULAR RATE: 78 BPM

## 2024-10-08 ENCOUNTER — APPOINTMENT (EMERGENCY)
Dept: RADIOLOGY | Facility: HOSPITAL | Age: 54
End: 2024-10-08
Payer: COMMERCIAL

## 2024-10-08 ENCOUNTER — HOSPITAL ENCOUNTER (EMERGENCY)
Facility: HOSPITAL | Age: 54
Discharge: HOME | End: 2024-10-08
Attending: EMERGENCY MEDICINE
Payer: COMMERCIAL

## 2024-10-08 VITALS
RESPIRATION RATE: 18 BRPM | BODY MASS INDEX: 35.13 KG/M2 | WEIGHT: 237.88 LBS | SYSTOLIC BLOOD PRESSURE: 137 MMHG | TEMPERATURE: 99.1 F | HEART RATE: 84 BPM | OXYGEN SATURATION: 95 % | DIASTOLIC BLOOD PRESSURE: 78 MMHG

## 2024-10-08 DIAGNOSIS — R60.0 LOWER EXTREMITY EDEMA: Primary | ICD-10-CM

## 2024-10-08 DIAGNOSIS — J90 PLEURAL EFFUSION: ICD-10-CM

## 2024-10-08 LAB
ALBUMIN SERPL BCG-MCNC: 2.2 G/DL (ref 3.5–5)
ALP SERPL-CCNC: 123 U/L (ref 34–104)
ALT SERPL W P-5'-P-CCNC: 22 U/L (ref 7–52)
ANION GAP SERPL CALCULATED.3IONS-SCNC: 6 MMOL/L (ref 4–13)
AST SERPL W P-5'-P-CCNC: 63 U/L (ref 13–39)
BASOPHILS # BLD AUTO: 0.13 THOUSANDS/ΜL (ref 0–0.1)
BASOPHILS NFR BLD AUTO: 2 % (ref 0–1)
BILIRUB SERPL-MCNC: 5.79 MG/DL (ref 0.2–1)
BNP SERPL-MCNC: 91 PG/ML (ref 0–100)
BUN SERPL-MCNC: 8 MG/DL (ref 5–25)
CALCIUM ALBUM COR SERPL-MCNC: 9.1 MG/DL (ref 8.3–10.1)
CALCIUM SERPL-MCNC: 7.7 MG/DL (ref 8.4–10.2)
CARDIAC TROPONIN I PNL SERPL HS: 14 NG/L
CHLORIDE SERPL-SCNC: 109 MMOL/L (ref 96–108)
CO2 SERPL-SCNC: 24 MMOL/L (ref 21–32)
CREAT SERPL-MCNC: 0.61 MG/DL (ref 0.6–1.3)
EOSINOPHIL # BLD AUTO: 0.56 THOUSAND/ΜL (ref 0–0.61)
EOSINOPHIL NFR BLD AUTO: 9 % (ref 0–6)
ERYTHROCYTE [DISTWIDTH] IN BLOOD BY AUTOMATED COUNT: 13.9 % (ref 11.6–15.1)
GFR SERPL CREATININE-BSD FRML MDRD: 114 ML/MIN/1.73SQ M
GLUCOSE SERPL-MCNC: 115 MG/DL (ref 65–140)
HCT VFR BLD AUTO: 34.8 % (ref 36.5–49.3)
HGB BLD-MCNC: 11.6 G/DL (ref 12–17)
IMM GRANULOCYTES # BLD AUTO: 0.02 THOUSAND/UL (ref 0–0.2)
IMM GRANULOCYTES NFR BLD AUTO: 0 % (ref 0–2)
LYMPHOCYTES # BLD AUTO: 2.07 THOUSANDS/ΜL (ref 0.6–4.47)
LYMPHOCYTES NFR BLD AUTO: 32 % (ref 14–44)
MCH RBC QN AUTO: 34 PG (ref 26.8–34.3)
MCHC RBC AUTO-ENTMCNC: 33.3 G/DL (ref 31.4–37.4)
MCV RBC AUTO: 102 FL (ref 82–98)
MONOCYTES # BLD AUTO: 1.53 THOUSAND/ΜL (ref 0.17–1.22)
MONOCYTES NFR BLD AUTO: 24 % (ref 4–12)
NEUTROPHILS # BLD AUTO: 2.18 THOUSANDS/ΜL (ref 1.85–7.62)
NEUTS SEG NFR BLD AUTO: 33 % (ref 43–75)
NRBC BLD AUTO-RTO: 0 /100 WBCS
PLATELET # BLD AUTO: 101 THOUSANDS/UL (ref 149–390)
PMV BLD AUTO: 9.2 FL (ref 8.9–12.7)
POTASSIUM SERPL-SCNC: 3.6 MMOL/L (ref 3.5–5.3)
PROT SERPL-MCNC: 5.7 G/DL (ref 6.4–8.4)
RBC # BLD AUTO: 3.41 MILLION/UL (ref 3.88–5.62)
SODIUM SERPL-SCNC: 139 MMOL/L (ref 135–147)
WBC # BLD AUTO: 6.49 THOUSAND/UL (ref 4.31–10.16)

## 2024-10-08 PROCEDURE — 71045 X-RAY EXAM CHEST 1 VIEW: CPT

## 2024-10-08 PROCEDURE — 99284 EMERGENCY DEPT VISIT MOD MDM: CPT

## 2024-10-08 PROCEDURE — 85025 COMPLETE CBC W/AUTO DIFF WBC: CPT | Performed by: EMERGENCY MEDICINE

## 2024-10-08 PROCEDURE — 83880 ASSAY OF NATRIURETIC PEPTIDE: CPT | Performed by: EMERGENCY MEDICINE

## 2024-10-08 PROCEDURE — 96374 THER/PROPH/DIAG INJ IV PUSH: CPT

## 2024-10-08 PROCEDURE — 93005 ELECTROCARDIOGRAM TRACING: CPT

## 2024-10-08 PROCEDURE — 36415 COLL VENOUS BLD VENIPUNCTURE: CPT | Performed by: EMERGENCY MEDICINE

## 2024-10-08 PROCEDURE — 99285 EMERGENCY DEPT VISIT HI MDM: CPT | Performed by: EMERGENCY MEDICINE

## 2024-10-08 PROCEDURE — 80053 COMPREHEN METABOLIC PANEL: CPT | Performed by: EMERGENCY MEDICINE

## 2024-10-08 PROCEDURE — 84484 ASSAY OF TROPONIN QUANT: CPT | Performed by: EMERGENCY MEDICINE

## 2024-10-08 RX ORDER — FUROSEMIDE 10 MG/ML
40 INJECTION INTRAMUSCULAR; INTRAVENOUS ONCE
Status: COMPLETED | OUTPATIENT
Start: 2024-10-08 | End: 2024-10-08

## 2024-10-08 RX ADMIN — FUROSEMIDE 40 MG: 10 INJECTION, SOLUTION INTRAMUSCULAR; INTRAVENOUS at 17:40

## 2024-10-08 NOTE — ED PROVIDER NOTES
Final diagnoses:   Lower extremity edema   Pleural effusion     ED Disposition       ED Disposition   Discharge    Condition   Stable    Date/Time   Tue Oct 8, 2024  6:30 PM    Comment   Curt Dominguez discharge to home/self care.                   Assessment & Plan       Medical Decision Making  Based on the history and medical screening exam performed the patient may be at risk for dependent edema, edema secondary to cirrhosis, CHF.    Based on the work-up performed in the emergency room which includes physical examination, and which may include laboratory studies and imaging as warranted including advanced imaging such as CT scan or ultrasound, the diagnostic considerations are narrowed to exclude limb or life-threatening process.    The patient is stable for discharge.  Patient is asymptomatic in the emergency department.  No chest pain or shortness of breath.  EKG is negative.  Lab work is at his baseline.  Chest x-ray reveals a right-sided pleural effusion but patient is without hypoxia or tachycardia or fever, appropriate for discharge.  He received dose of IV Lasix in the emergency department.  Already prescribed Lasix from doctor visit earlier today, states he will fill the prescription tomorrow.  Appropriate for discharge    Amount and/or Complexity of Data Reviewed  Labs: ordered. Decision-making details documented in ED Course.     Details: At patient baseline  Radiology: ordered and independent interpretation performed. Decision-making details documented in ED Course.     Details: Right-sided pleural effusion  ECG/medicine tests: ordered and independent interpretation performed. Decision-making details documented in ED Course.     Details: Normal sinus rhythm rate 82    Risk  Prescription drug management.        ED Course as of 10/08/24 1855   Tue Oct 08, 2024   1811 Total Bilirubin(!): 5.79  Chronic for patient   1811 Hemoglobin(!): 11.6  At patient baseline       Medications   furosemide (LASIX)  injection 40 mg (40 mg Intravenous Given 10/8/24 1740)       ED Risk Strat Scores                           SBIRT 22yo+      Flowsheet Row Most Recent Value   Initial Alcohol Screen: US AUDIT-C     1. How often do you have a drink containing alcohol? 6 Filed at: 10/08/2024 1720   2. How many drinks containing alcohol do you have on a typical day you are drinking?  2 Filed at: 10/08/2024 1720   3a. Male UNDER 65: How often do you have five or more drinks on one occasion? 6 Filed at: 10/08/2024 1720   Audit-C Score 14 Filed at: 10/08/2024 1720   SAW: How many times in the past year have you...    Used an illegal drug or used a prescription medication for non-medical reasons? Never Filed at: 10/08/2024 1720                            History of Present Illness       Chief Complaint   Patient presents with    Leg Swelling     Patient presents to the  ED with complaints of bilateral lower leg swelling and testicle swelling. The patient reports not taking his lasix x1 month secondary to running out. He also reports stage 4 liver disease, however continues to drink alcohol daily.        Past Medical History:   Diagnosis Date    Acute respiratory failure with hypoxia (HCC) 03/20/2024    Alcohol abuse     Depression     Dysphagia     Fracture of one rib, left side, initial encounter for closed fracture     GERD (gastroesophageal reflux disease)     Hypertension 03/22/2024    Hypokalemia     Hyponatremia 03/20/2024    Lactic acid acidosis 03/20/2024    Liver failure (HCC)     Muscle wasting and atrophy, not elsewhere classified, multiple sites     Muscle weakness     SERGEI (obstructive sleep apnea)     Other disorders of bilirubin metabolism     Pleural effusion     Sacral fracture (HCC)     SIRS (systemic inflammatory response syndrome) (HCC) 03/20/2024    Splenic rupture       Past Surgical History:   Procedure Laterality Date    BRAIN SURGERY      IR CHEST TUBE PLACEMENT  3/20/2024      History reviewed. No pertinent family  history.   Social History     Tobacco Use    Smoking status: Never    Smokeless tobacco: Never   Vaping Use    Vaping status: Never Used   Substance Use Topics    Alcohol use: Yes     Alcohol/week: 14.0 standard drinks of alcohol     Types: 14 Cans of beer per week    Drug use: Not Currently      E-Cigarette/Vaping    E-Cigarette Use Never User       E-Cigarette/Vaping Substances      I have reviewed and agree with the history as documented.     Patient with prior history of GERD, hypertension, alcoholic liver cirrhosis, states that he has not had his Lasix for the past month.  He has been developing progressively worsening leg swelling.  States he went to see his primary care physician today and was prescribed Lasix but has had no opportunity to fill the prescription up until this time.  He has no complaints of chest pain or shortness of breath.  States his roommate called 911 due to his leg swelling but that he would not have elected to come to the hospital.  At that time that he presents to the hospital he has no complaints aside from leg swelling.  States he is able to fill his Lasix prescription tomorrow.      History provided by:  Patient   used: No    Medical Problem  Location:  Bilateral legs  Quality:  Swelling  Severity:  Moderate  Onset quality:  Gradual  Duration:  1 month  Timing:  Constant  Progression:  Unchanged  Chronicity:  New  Relieved by:  Nothing  Worsened by:  Nothing  Associated symptoms: no abdominal pain, no chest pain, no congestion, no cough, no diarrhea, no ear pain, no fatigue, no fever, no headaches, no myalgias, no nausea, no rash, no shortness of breath, no sore throat, no vomiting and no wheezing        Review of Systems   Constitutional:  Negative for chills, fatigue and fever.   HENT:  Negative for congestion, ear pain, hearing loss, sore throat, trouble swallowing and voice change.    Eyes:  Negative for pain and discharge.   Respiratory:  Negative for cough,  shortness of breath and wheezing.    Cardiovascular:  Positive for leg swelling. Negative for chest pain and palpitations.   Gastrointestinal:  Negative for abdominal pain, blood in stool, constipation, diarrhea, nausea and vomiting.   Genitourinary:  Negative for dysuria, flank pain, frequency and hematuria.   Musculoskeletal:  Negative for joint swelling, myalgias, neck pain and neck stiffness.   Skin:  Negative for rash and wound.   Neurological:  Negative for dizziness, seizures, syncope, facial asymmetry and headaches.   Psychiatric/Behavioral:  Negative for hallucinations, self-injury and suicidal ideas.    All other systems reviewed and are negative.          Objective       ED Triage Vitals [10/08/24 1721]   Temperature Pulse Blood Pressure Respirations SpO2 Patient Position - Orthostatic VS   99.1 °F (37.3 °C) 92 142/76 18 96 % Sitting      Temp Source Heart Rate Source BP Location FiO2 (%) Pain Score    Temporal Monitor Left arm -- No Pain      Vitals      Date and Time Temp Pulse SpO2 Resp BP Pain Score FACES Pain Rating User   10/08/24 1830 -- 84 95 % -- 137/78 -- -- MB   10/08/24 1815 -- 83 94 % -- 139/73 -- -- MB   10/08/24 1800 -- 81 94 % -- 135/75 -- -- MB   10/08/24 1745 -- 90 94 % -- 127/75 -- -- MB   10/08/24 1730 -- 85 94 % -- 115/68 -- -- MD   10/08/24 1721 99.1 °F (37.3 °C) 92 96 % 18 142/76 No Pain -- RR            Physical Exam  Vitals and nursing note reviewed.   Constitutional:       General: He is not in acute distress.     Appearance: He is well-developed.   HENT:      Head: Normocephalic and atraumatic.      Right Ear: External ear normal.      Left Ear: External ear normal.   Eyes:      General: No scleral icterus.        Right eye: No discharge.         Left eye: No discharge.      Extraocular Movements: Extraocular movements intact.      Conjunctiva/sclera: Conjunctivae normal.   Cardiovascular:      Rate and Rhythm: Normal rate and regular rhythm.      Heart sounds: Normal heart  sounds. No murmur heard.  Pulmonary:      Effort: Pulmonary effort is normal.      Breath sounds: Normal breath sounds. No wheezing or rales.   Abdominal:      General: Bowel sounds are normal. There is no distension.      Palpations: Abdomen is soft.      Tenderness: There is no abdominal tenderness. There is no guarding or rebound.   Musculoskeletal:         General: No deformity. Normal range of motion.      Cervical back: Normal range of motion and neck supple.      Comments: 4+ bilateral dependent edema lower extremities, pitting   Skin:     General: Skin is warm and dry.      Findings: No rash.   Neurological:      General: No focal deficit present.      Mental Status: He is alert and oriented to person, place, and time.      Cranial Nerves: No cranial nerve deficit.   Psychiatric:         Mood and Affect: Mood normal.         Behavior: Behavior normal.         Thought Content: Thought content normal.         Judgment: Judgment normal.         Results Reviewed       Procedure Component Value Units Date/Time    B-Type Natriuretic Peptide(BNP) [[id number]]  (Normal) Collected: 10/08/24 1734    Lab Status: Final result Specimen: Blood from Arm, Right Updated: 10/08/24 1810     BNP 91 pg/mL     HS Troponin 0hr (reflex protocol) [[id number]]  (Normal) Collected: 10/08/24 1734    Lab Status: Final result Specimen: Blood from Arm, Right Updated: 10/08/24 1809     hs TnI 0hr 14 ng/L     HS Troponin I 2hr [[id number]]     Lab Status: No result Specimen: Blood     Comprehensive metabolic panel [[id number]]  (Abnormal) Collected: 10/08/24 1734    Lab Status: Final result Specimen: Blood from Arm, Right Updated: 10/08/24 1802     Sodium 139 mmol/L      Potassium 3.6 mmol/L      Chloride 109 mmol/L      CO2 24 mmol/L      ANION GAP 6 mmol/L      BUN 8 mg/dL      Creatinine 0.61 mg/dL      Glucose 115 mg/dL      Calcium 7.7 mg/dL      Corrected Calcium 9.1 mg/dL      AST 63 U/L      ALT 22 U/L      Alkaline Phosphatase 123 U/L       Total Protein 5.7 g/dL      Albumin 2.2 g/dL      Total Bilirubin 5.79 mg/dL      eGFR 114 ml/min/1.73sq m     Narrative:      Specimen Icteric.  [employer] guidelines for Chronic Kidney Disease (CKD):     Stage 1 with normal or high GFR (GFR > 90 mL/min/1.73 square meters)    Stage 2 Mild CKD (GFR = 60-89 mL/min/1.73 square meters)    Stage 3A Moderate CKD (GFR = 45-59 mL/min/1.73 square meters)    Stage 3B Moderate CKD (GFR = 30-44 mL/min/1.73 square meters)    Stage 4 Severe CKD (GFR = 15-29 mL/min/1.73 square meters)    Stage 5 End Stage CKD (GFR <15 mL/min/1.73 square meters)  Note: GFR calculation is accurate only with a steady state creatinine    CBC and differential [[id number]]  (Abnormal) Collected: 10/08/24 1734    Lab Status: Final result Specimen: Blood from Arm, Right Updated: 10/08/24 1745     WBC 6.49 Thousand/uL      RBC 3.41 Million/uL      Hemoglobin 11.6 g/dL      Hematocrit 34.8 %       fL      MCH 34.0 pg      MCHC 33.3 g/dL      RDW 13.9 %      MPV 9.2 fL      Platelets 101 Thousands/uL      nRBC 0 /100 WBCs      Segmented % 33 %      Immature Grans % 0 %      Lymphocytes % 32 %      Monocytes % 24 %      Eosinophils Relative 9 %      Basophils Relative 2 %      Absolute Neutrophils 2.18 Thousands/µL      Absolute Immature Grans 0.02 Thousand/uL      Absolute Lymphocytes 2.07 Thousands/µL      Absolute Monocytes 1.53 Thousand/µL      Eosinophils Absolute 0.56 Thousand/µL      Basophils Absolute 0.13 Thousands/µL             XR chest portable   ED Interpretation by Jacoby Sam MD (10/08 1812)   Right-sided pleural effusion noted          ECG 12 Lead Documentation Only    Date/Time: 10/8/2024 5:38 PM    Performed by: Jacoby Sam MD  Authorized by: Jacoby Sam MD    ECG reviewed by me, the ED Provider: yes    Patient location:  ED  Previous ECG:     Previous ECG:  Unavailable  Interpretation:     Interpretation: normal    Rate:     ECG rate:   82    ECG rate assessment: normal    Rhythm:     Rhythm: sinus rhythm    Ectopy:     Ectopy: none    QRS:     QRS axis:  Normal    QRS intervals:  Normal  Conduction:     Conduction: normal    ST segments:     ST segments:  Normal  T waves:     T waves: normal        ED Medication and Procedure Management   Prior to Admission Medications   Prescriptions Last Dose Informant Patient Reported? Taking?   FLUoxetine (PROzac) 10 mg capsule   Yes No   Sig: Take 20 mg by mouth daily   Magnesium Oxide 400 MG CAPS   Yes No   Sig: Take 400 mg by mouth in the morning   Multiple Vitamins-Minerals (multivitamin with minerals) tablet   No No   Sig: Take 1 tablet by mouth daily   acamprosate (CAMPRAL) 333 mg tablet   No No   Sig: Take 2 tablets (666 mg total) by mouth 3 (three) times a day   carvedilol (COREG) 6.25 mg tablet   Yes No   Sig: Take 6.25 mg by mouth 2 (two) times a day with meals   folic acid (FOLVITE) 1 mg tablet   Yes No   Sig: Take 1 mg by mouth daily   furosemide (LASIX) 40 mg tablet   No No   Sig: Take 1 tablet (40 mg total) by mouth daily   lactulose 20 g/30 mL   Yes No   Sig: Take 30 g by mouth daily   lamoTRIgine (LaMICtal) 25 mg tablet   Yes No   Sig: Take 25 mg by mouth daily   omeprazole (PriLOSEC) 40 MG capsule   Yes No   Sig: Take 40 mg by mouth daily   rifaximin (XIFAXAN) 550 mg tablet   Yes No   Sig: Take 550 mg by mouth in the morning   spironolactone (ALDACTONE) 100 mg tablet   No No   Sig: Take 1 tablet (100 mg total) by mouth daily   thiamine 100 MG tablet   No No   Sig: Take 1 tablet (100 mg total) by mouth daily Do not start before May 2, 2024.      Facility-Administered Medications: None     Patient's Medications   Discharge Prescriptions    No medications on file     No discharge procedures on file.  ED SEPSIS DOCUMENTATION   Time reflects when diagnosis was documented in both MDM as applicable and the Disposition within this note       Time User Action Codes Description Comment    10/8/2024   6:30 PM Jacoby Sam [R60.0] Lower extremity edema     10/8/2024  6:30 PM Jacoby Sam [[id number]] Pleural effusion                  Jacoby Sam MD  10/08/24 9155

## 2024-10-10 LAB
ATRIAL RATE: 82 BPM
P AXIS: 71 DEGREES
PR INTERVAL: 146 MS
QRS AXIS: 69 DEGREES
QRSD INTERVAL: 96 MS
QT INTERVAL: 414 MS
QTC INTERVAL: 483 MS
T WAVE AXIS: 55 DEGREES
VENTRICULAR RATE: 82 BPM

## 2024-10-10 PROCEDURE — 93010 ELECTROCARDIOGRAM REPORT: CPT | Performed by: INTERNAL MEDICINE

## 2024-10-14 ENCOUNTER — HOSPITAL ENCOUNTER (EMERGENCY)
Facility: HOSPITAL | Age: 54
Discharge: HOME | End: 2024-10-14
Attending: EMERGENCY MEDICINE
Payer: COMMERCIAL

## 2024-10-14 ENCOUNTER — APPOINTMENT (EMERGENCY)
Dept: RADIOLOGY | Facility: HOSPITAL | Age: 54
End: 2024-10-14
Payer: COMMERCIAL

## 2024-10-14 VITALS
WEIGHT: 241.4 LBS | BODY MASS INDEX: 35.76 KG/M2 | SYSTOLIC BLOOD PRESSURE: 118 MMHG | DIASTOLIC BLOOD PRESSURE: 73 MMHG | HEART RATE: 99 BPM | OXYGEN SATURATION: 96 % | RESPIRATION RATE: 18 BRPM | HEIGHT: 69 IN | TEMPERATURE: 97.9 F

## 2024-10-14 DIAGNOSIS — R60.0 LOWER EXTREMITY EDEMA: Primary | ICD-10-CM

## 2024-10-14 LAB
ALBUMIN SERPL BCG-MCNC: 2.1 G/DL (ref 3.5–5)
ALP SERPL-CCNC: 106 U/L (ref 34–104)
ALT SERPL W P-5'-P-CCNC: 20 U/L (ref 7–52)
ANION GAP SERPL CALCULATED.3IONS-SCNC: 5 MMOL/L (ref 4–13)
AST SERPL W P-5'-P-CCNC: 55 U/L (ref 13–39)
BASOPHILS # BLD AUTO: 0.08 THOUSANDS/ΜL (ref 0–0.1)
BASOPHILS NFR BLD AUTO: 2 % (ref 0–1)
BILIRUB SERPL-MCNC: 5.87 MG/DL (ref 0.2–1)
BNP SERPL-MCNC: 126 PG/ML (ref 0–100)
BUN SERPL-MCNC: 7 MG/DL (ref 5–25)
CALCIUM ALBUM COR SERPL-MCNC: 9 MG/DL (ref 8.3–10.1)
CALCIUM SERPL-MCNC: 7.5 MG/DL (ref 8.4–10.2)
CARDIAC TROPONIN I PNL SERPL HS: 14 NG/L
CHLORIDE SERPL-SCNC: 107 MMOL/L (ref 96–108)
CO2 SERPL-SCNC: 27 MMOL/L (ref 21–32)
CREAT SERPL-MCNC: 0.69 MG/DL (ref 0.6–1.3)
EOSINOPHIL # BLD AUTO: 0.31 THOUSAND/ΜL (ref 0–0.61)
EOSINOPHIL NFR BLD AUTO: 6 % (ref 0–6)
ERYTHROCYTE [DISTWIDTH] IN BLOOD BY AUTOMATED COUNT: 14 % (ref 11.6–15.1)
GFR SERPL CREATININE-BSD FRML MDRD: 109 ML/MIN/1.73SQ M
GLUCOSE SERPL-MCNC: 144 MG/DL (ref 65–140)
HCT VFR BLD AUTO: 33.5 % (ref 36.5–49.3)
HGB BLD-MCNC: 11.2 G/DL (ref 12–17)
IMM GRANULOCYTES # BLD AUTO: 0.03 THOUSAND/UL (ref 0–0.2)
IMM GRANULOCYTES NFR BLD AUTO: 1 % (ref 0–2)
LYMPHOCYTES # BLD AUTO: 1.59 THOUSANDS/ΜL (ref 0.6–4.47)
LYMPHOCYTES NFR BLD AUTO: 32 % (ref 14–44)
MCH RBC QN AUTO: 34.4 PG (ref 26.8–34.3)
MCHC RBC AUTO-ENTMCNC: 33.4 G/DL (ref 31.4–37.4)
MCV RBC AUTO: 103 FL (ref 82–98)
MONOCYTES # BLD AUTO: 1.18 THOUSAND/ΜL (ref 0.17–1.22)
MONOCYTES NFR BLD AUTO: 24 % (ref 4–12)
NEUTROPHILS # BLD AUTO: 1.74 THOUSANDS/ΜL (ref 1.85–7.62)
NEUTS SEG NFR BLD AUTO: 35 % (ref 43–75)
NRBC BLD AUTO-RTO: 0 /100 WBCS
PLATELET # BLD AUTO: 78 THOUSANDS/UL (ref 149–390)
PMV BLD AUTO: 8.9 FL (ref 8.9–12.7)
POTASSIUM SERPL-SCNC: 3.3 MMOL/L (ref 3.5–5.3)
PROT SERPL-MCNC: 5.6 G/DL (ref 6.4–8.4)
RBC # BLD AUTO: 3.26 MILLION/UL (ref 3.88–5.62)
SODIUM SERPL-SCNC: 139 MMOL/L (ref 135–147)
WBC # BLD AUTO: 4.93 THOUSAND/UL (ref 4.31–10.16)

## 2024-10-14 PROCEDURE — 36415 COLL VENOUS BLD VENIPUNCTURE: CPT | Performed by: EMERGENCY MEDICINE

## 2024-10-14 PROCEDURE — 80053 COMPREHEN METABOLIC PANEL: CPT | Performed by: EMERGENCY MEDICINE

## 2024-10-14 PROCEDURE — 99285 EMERGENCY DEPT VISIT HI MDM: CPT | Performed by: EMERGENCY MEDICINE

## 2024-10-14 PROCEDURE — 99283 EMERGENCY DEPT VISIT LOW MDM: CPT

## 2024-10-14 PROCEDURE — 85025 COMPLETE CBC W/AUTO DIFF WBC: CPT | Performed by: EMERGENCY MEDICINE

## 2024-10-14 PROCEDURE — 83880 ASSAY OF NATRIURETIC PEPTIDE: CPT | Performed by: EMERGENCY MEDICINE

## 2024-10-14 PROCEDURE — 84484 ASSAY OF TROPONIN QUANT: CPT | Performed by: EMERGENCY MEDICINE

## 2024-10-14 PROCEDURE — 96374 THER/PROPH/DIAG INJ IV PUSH: CPT

## 2024-10-14 PROCEDURE — 71045 X-RAY EXAM CHEST 1 VIEW: CPT

## 2024-10-14 RX ORDER — FUROSEMIDE 10 MG/ML
40 INJECTION INTRAMUSCULAR; INTRAVENOUS ONCE
Status: COMPLETED | OUTPATIENT
Start: 2024-10-14 | End: 2024-10-14

## 2024-10-14 RX ORDER — POTASSIUM CHLORIDE 1500 MG/1
40 TABLET, EXTENDED RELEASE ORAL ONCE
Status: COMPLETED | OUTPATIENT
Start: 2024-10-14 | End: 2024-10-14

## 2024-10-14 RX ADMIN — FUROSEMIDE 40 MG: 10 INJECTION, SOLUTION INTRAMUSCULAR; INTRAVENOUS at 15:07

## 2024-10-14 RX ADMIN — POTASSIUM CHLORIDE 40 MEQ: 1500 TABLET, EXTENDED RELEASE ORAL at 15:57

## 2024-10-14 NOTE — ED PROVIDER NOTES
Time reflects when diagnosis was documented in both MDM as applicable and the Disposition within this note       Time User Action Codes Description Comment    10/14/2024  3:43 PM Jacoby Sam Add [R60.0] Lower extremity edema           ED Disposition       ED Disposition   Discharge    Condition   Stable    Date/Time   Mon Oct 14, 2024  3:43 PM    Comment   Cutr Angelica discharge to home/self care.                   Assessment & Plan       Medical Decision Making  Based on the history and medical screening exam performed the patient may be at risk for dependent edema, liver cirrhosis, CHF.    Based on the work-up performed in the emergency room which includes physical examination, and which may include laboratory studies and imaging as warranted including advanced imaging such as CT scan or ultrasound, the diagnostic considerations are narrowed to exclude limb or life-threatening process.    The patient is stable for discharge.    Patient has significant lower extremity edema but no other symptom.  He is able to ambulate throughout the ED without desaturation or dyspnea.  He admits to noncompliance with his Lasix.  X-ray reveals pleural effusion unchanged from prior.  Lab work is at his baseline consistent with his known liver disease.  Patient remains hemodynamically stable.  Agreeable for discharge at this time.  States will comply with his Lasix dosing.  Outpatient follow-up information for cardiology and GI provided to patient.    I explained to patient it is important that he take his Lasix as prescribed.    Amount and/or Complexity of Data Reviewed  Labs: ordered. Decision-making details documented in ED Course.     Details: Stable abnormalities consistent with known history of liver disease  Radiology: ordered and independent interpretation performed. Decision-making details documented in ED Course.     Details: Chest x-ray reveals right-sided pleural effusion unchanged from  prior    Risk  Prescription drug management.        ED Course as of 10/14/24 1545   Mon Oct 14, 2024   1536 Patient was able to ambulate throughout the ED without significant desaturation, states he did not feel short of breath.  Lab work is at his baseline.  No indication for admission at this time.  Will provide outpatient follow-up information for GI and cardiology.       Medications   potassium chloride (Klor-Con M20) CR tablet 40 mEq (has no administration in time range)   furosemide (LASIX) injection 40 mg (40 mg Intravenous Given 10/14/24 1507)       ED Risk Strat Scores                CIWA-Ar Score       Row Name 10/14/24 1459             CIWA-Ar    Blood Pressure 120/70      Pulse 83      Nausea and Vomiting 0      Tactile Disturbances 0      Tremor 2      Auditory Disturbances 0      Paroxysmal Sweats 0      Visual Disturbances 0      Anxiety 1      Headache, Fullness in Head 0      Agitation 0      Orientation and Clouding of Sensorium 0      CIWA-Ar Total 3                              SBIRT 22yo+      Flowsheet Row Most Recent Value   Initial Alcohol Screen: US AUDIT-C     1. How often do you have a drink containing alcohol? 6 Filed at: 10/14/2024 1504   2. How many drinks containing alcohol do you have on a typical day you are drinking?  4 Filed at: 10/14/2024 1504   3a. Male UNDER 65: How often do you have five or more drinks on one occasion? 6 Filed at: 10/14/2024 1504   Audit-C Score 16 Filed at: 10/14/2024 1504   Full Alcohol Screen: US AUDIT    4. How often during the last year have you found that you were not able to stop drinking once you had started? 4 Filed at: 10/14/2024 1504   5. How often during past year have you failed to do what was normally expected of you because of drinking?  4 Filed at: 10/14/2024 1504   6. How often in past year have you needed a first drink in the morning to get yourself going after a heavy drinking session?  4 Filed at: 10/14/2024 1504   10. Has a relative,  friend, doctor or other health worker been concerned about your drinking and suggested you cut down?  4 Filed at: 10/14/2024 1509   SAW: How many times in the past year have you...    Used an illegal drug or used a prescription medication for non-medical reasons? Never Filed at: 10/14/2024 1504                            History of Present Illness       Chief Complaint   Patient presents with    Leg Swelling     Pt arrive from home with bilateral lower leg swelling, pt recently increased lasix without relief. Denies SOB. Pt very jaundice, reports this is his normal color.        Past Medical History:   Diagnosis Date    Acute respiratory failure with hypoxia (HCC) 03/20/2024    Alcohol abuse     Depression     Dysphagia     Fracture of one rib, left side, initial encounter for closed fracture     GERD (gastroesophageal reflux disease)     Hypertension 03/22/2024    Hypokalemia     Hyponatremia 03/20/2024    Lactic acid acidosis 03/20/2024    Liver failure (HCC)     Muscle wasting and atrophy, not elsewhere classified, multiple sites     Muscle weakness     SERGEI (obstructive sleep apnea)     Other disorders of bilirubin metabolism     Pleural effusion     Sacral fracture (HCC)     SIRS (systemic inflammatory response syndrome) (HCC) 03/20/2024    Splenic rupture       Past Surgical History:   Procedure Laterality Date    BRAIN SURGERY      IR CHEST TUBE PLACEMENT  3/20/2024      History reviewed. No pertinent family history.   Social History     Tobacco Use    Smoking status: Never    Smokeless tobacco: Never   Vaping Use    Vaping status: Never Used   Substance Use Topics    Alcohol use: Yes     Alcohol/week: 14.0 standard drinks of alcohol     Types: 14 Cans of beer per week    Drug use: Not Currently      E-Cigarette/Vaping    E-Cigarette Use Never User       E-Cigarette/Vaping Substances      I have reviewed and agree with the history as documented.     Patient with history of liver disease and chronic leg edema  complains of continuing leg edema.  Denies chest pain or shortness of breath.  Denies fever.  Admits to being noncompliant with his Lasix.      History provided by:  Patient   used: No    Medical Problem  Location:  Bilateral legs  Quality:  Edematous  Severity:  Moderate  Onset quality:  Unable to specify  Timing:  Constant  Progression:  Unchanged  Chronicity:  Chronic  Context:  Noncompliant with Lasix  Relieved by:  Nothing  Worsened by:  Nothing  Associated symptoms: no abdominal pain, no chest pain, no cough, no diarrhea, no ear pain, no fever, no headaches, no loss of consciousness, no nausea, no rash, no shortness of breath, no sore throat, no vomiting and no wheezing        Review of Systems   Constitutional:  Negative for chills and fever.   HENT:  Negative for ear pain, hearing loss, sore throat, trouble swallowing and voice change.    Eyes:  Negative for pain and discharge.   Respiratory:  Negative for cough, shortness of breath and wheezing.    Cardiovascular:  Positive for leg swelling. Negative for chest pain and palpitations.   Gastrointestinal:  Negative for abdominal pain, blood in stool, constipation, diarrhea, nausea and vomiting.   Genitourinary:  Negative for dysuria, flank pain, frequency and hematuria.   Musculoskeletal:  Negative for joint swelling, neck pain and neck stiffness.   Skin:  Negative for rash and wound.   Neurological:  Negative for dizziness, seizures, loss of consciousness, syncope, facial asymmetry and headaches.   Psychiatric/Behavioral:  Negative for hallucinations, self-injury and suicidal ideas.    All other systems reviewed and are negative.          Objective       ED Triage Vitals   Temperature Pulse Blood Pressure Respirations SpO2 Patient Position - Orthostatic VS   10/14/24 1427 10/14/24 1428 10/14/24 1427 10/14/24 1427 10/14/24 1428 10/14/24 1500   97.9 °F (36.6 °C) 83 120/70 18 97 % Lying      Temp Source Heart Rate Source BP Location FiO2 (%)  Pain Score    10/14/24 1427 10/14/24 1427 10/14/24 1500 -- 10/14/24 1427    Temporal Monitor Left arm  No Pain      Vitals      Date and Time Temp Pulse SpO2 Resp BP Pain Score FACES Pain Rating User   10/14/24 1534 -- -- 90 % -- -- -- -- AS   10/14/24 1500 -- 89 96 % 18 128/70 -- -- AD   10/14/24 1459 -- 83 -- -- 120/70 -- -- AD   10/14/24 1428 -- 83 97 % -- -- -- -- MD   10/14/24 1427 97.9 °F (36.6 °C) -- -- 18 120/70 No Pain -- MD            Physical Exam  Vitals and nursing note reviewed.   Constitutional:       General: He is not in acute distress.     Appearance: He is well-developed.   HENT:      Head: Normocephalic and atraumatic.      Right Ear: External ear normal.      Left Ear: External ear normal.   Eyes:      General: No scleral icterus.        Right eye: No discharge.         Left eye: No discharge.      Extraocular Movements: Extraocular movements intact.      Conjunctiva/sclera: Conjunctivae normal.   Cardiovascular:      Rate and Rhythm: Normal rate and regular rhythm.      Heart sounds: Normal heart sounds. No murmur heard.  Pulmonary:      Effort: Pulmonary effort is normal.      Breath sounds: Normal breath sounds. No wheezing or rales.   Abdominal:      General: Bowel sounds are normal. There is no distension.      Palpations: Abdomen is soft.      Tenderness: There is no abdominal tenderness. There is no guarding or rebound.   Musculoskeletal:         General: No deformity. Normal range of motion.      Cervical back: Normal range of motion and neck supple.      Right lower leg: Edema present.      Left lower leg: Edema present.   Skin:     General: Skin is warm and dry.      Findings: No rash.   Neurological:      General: No focal deficit present.      Mental Status: He is alert and oriented to person, place, and time.      Cranial Nerves: No cranial nerve deficit.   Psychiatric:         Mood and Affect: Mood normal.         Behavior: Behavior normal.         Thought Content: Thought content  normal.         Judgment: Judgment normal.         Results Reviewed       Procedure Component Value Units Date/Time    HS Troponin 0hr (reflex protocol) [[id number]]  (Normal) Collected: 10/14/24 1456    Lab Status: Final result Specimen: Blood from Arm, Right Updated: 10/14/24 1529     hs TnI 0hr 14 ng/L     HS Troponin I 2hr [[id number]]     Lab Status: No result Specimen: Blood     B-Type Natriuretic Peptide(BNP) [[id number]]  (Abnormal) Collected: 10/14/24 1456    Lab Status: Final result Specimen: Blood from Arm, Right Updated: 10/14/24 1527      pg/mL     Comprehensive metabolic panel [[id number]]  (Abnormal) Collected: 10/14/24 1456    Lab Status: Final result Specimen: Blood from Arm, Right Updated: 10/14/24 1521     Sodium 139 mmol/L      Potassium 3.3 mmol/L      Chloride 107 mmol/L      CO2 27 mmol/L      ANION GAP 5 mmol/L      BUN 7 mg/dL      Creatinine 0.69 mg/dL      Glucose 144 mg/dL      Calcium 7.5 mg/dL      Corrected Calcium 9.0 mg/dL      AST 55 U/L      ALT 20 U/L      Alkaline Phosphatase 106 U/L      Total Protein 5.6 g/dL      Albumin 2.1 g/dL      Total Bilirubin 5.87 mg/dL      eGFR 109 ml/min/1.73sq m     Narrative:      Specimen Icteric.  [employer] guidelines for Chronic Kidney Disease (CKD):     Stage 1 with normal or high GFR (GFR > 90 mL/min/1.73 square meters)    Stage 2 Mild CKD (GFR = 60-89 mL/min/1.73 square meters)    Stage 3A Moderate CKD (GFR = 45-59 mL/min/1.73 square meters)    Stage 3B Moderate CKD (GFR = 30-44 mL/min/1.73 square meters)    Stage 4 Severe CKD (GFR = 15-29 mL/min/1.73 square meters)    Stage 5 End Stage CKD (GFR <15 mL/min/1.73 square meters)  Note: GFR calculation is accurate only with a steady state creatinine    CBC and differential [[id number]]  (Abnormal) Collected: 10/14/24 1456    Lab Status: Final result Specimen: Blood from Arm, Right Updated: 10/14/24 1506     WBC 4.93 Thousand/uL      RBC 3.26 Million/uL      Hemoglobin  11.2 g/dL      Hematocrit 33.5 %       fL      MCH 34.4 pg      MCHC 33.4 g/dL      RDW 14.0 %      MPV 8.9 fL      Platelets 78 Thousands/uL      nRBC 0 /100 WBCs      Segmented % 35 %      Immature Grans % 1 %      Lymphocytes % 32 %      Monocytes % 24 %      Eosinophils Relative 6 %      Basophils Relative 2 %      Absolute Neutrophils 1.74 Thousands/µL      Absolute Immature Grans 0.03 Thousand/uL      Absolute Lymphocytes 1.59 Thousands/µL      Absolute Monocytes 1.18 Thousand/µL      Eosinophils Absolute 0.31 Thousand/µL      Basophils Absolute 0.08 Thousands/µL             XR chest portable   ED Interpretation by Jacoby Sam MD (10/14 4977)   Right-sided pleural effusion unchanged          Procedures    ED Medication and Procedure Management   Prior to Admission Medications   Prescriptions Last Dose Informant Patient Reported? Taking?   FLUoxetine (PROzac) 10 mg capsule   Yes No   Sig: Take 20 mg by mouth daily   Magnesium Oxide 400 MG CAPS   Yes No   Sig: Take 400 mg by mouth in the morning   Multiple Vitamins-Minerals (multivitamin with minerals) tablet   No No   Sig: Take 1 tablet by mouth daily   acamprosate (CAMPRAL) 333 mg tablet   No No   Sig: Take 2 tablets (666 mg total) by mouth 3 (three) times a day   carvedilol (COREG) 6.25 mg tablet   Yes No   Sig: Take 6.25 mg by mouth 2 (two) times a day with meals   folic acid (FOLVITE) 1 mg tablet   Yes No   Sig: Take 1 mg by mouth daily   furosemide (LASIX) 40 mg tablet   No No   Sig: Take 1 tablet (40 mg total) by mouth daily   lactulose 20 g/30 mL   Yes No   Sig: Take 30 g by mouth daily   lamoTRIgine (LaMICtal) 25 mg tablet   Yes No   Sig: Take 25 mg by mouth daily   omeprazole (PriLOSEC) 40 MG capsule   Yes No   Sig: Take 40 mg by mouth daily   rifaximin (XIFAXAN) 550 mg tablet   Yes No   Sig: Take 550 mg by mouth in the morning   spironolactone (ALDACTONE) 100 mg tablet   No No   Sig: Take 1 tablet (100 mg total) by mouth daily   thiamine  100 MG tablet   No No   Sig: Take 1 tablet (100 mg total) by mouth daily Do not start before May 2, 2024.      Facility-Administered Medications: None     Patient's Medications   Discharge Prescriptions    No medications on file     No discharge procedures on file.  ED SEPSIS DOCUMENTATION   Time reflects when diagnosis was documented in both MDM as applicable and the Disposition within this note       Time User Action Codes Description Comment    10/14/2024  3:43 PM Jacoby Sam Add [R60.0] Lower extremity edema                  Jacoby Sam MD  10/14/24 2443
